# Patient Record
Sex: MALE | Race: WHITE | NOT HISPANIC OR LATINO | Employment: FULL TIME | RURAL
[De-identification: names, ages, dates, MRNs, and addresses within clinical notes are randomized per-mention and may not be internally consistent; named-entity substitution may affect disease eponyms.]

---

## 2020-08-19 ENCOUNTER — HISTORICAL (OUTPATIENT)
Dept: ADMINISTRATIVE | Facility: HOSPITAL | Age: 35
End: 2020-08-19

## 2020-09-15 ENCOUNTER — HISTORICAL (OUTPATIENT)
Dept: ADMINISTRATIVE | Facility: HOSPITAL | Age: 35
End: 2020-09-15

## 2020-09-15 LAB
ALBUMIN SERPL BCP-MCNC: 4.5 G/DL (ref 3.5–5)
ALBUMIN/GLOB SERPL: 1.2 {RATIO}
ALP SERPL-CCNC: 74 U/L (ref 45–115)
ALT SERPL W P-5'-P-CCNC: 29 U/L (ref 16–61)
ANION GAP SERPL CALCULATED.3IONS-SCNC: 13 MMOL/L
AST SERPL W P-5'-P-CCNC: 14 U/L (ref 15–37)
BASOPHILS # BLD AUTO: 0.03 X10E3/UL (ref 0–0.2)
BASOPHILS NFR BLD AUTO: 0.3 % (ref 0–1)
BILIRUB SERPL-MCNC: 0.3 MG/DL (ref 0–1.2)
BUN SERPL-MCNC: 28 MG/DL (ref 7–18)
BUN/CREAT SERPL: 18.7
CALCIUM SERPL-MCNC: 9.4 MG/DL (ref 8.5–10.1)
CHLORIDE SERPL-SCNC: 104 MMOL/L (ref 98–107)
CO2 SERPL-SCNC: 26 MMOL/L (ref 21–32)
CREAT SERPL-MCNC: 1.5 MG/DL (ref 0.7–1.3)
D DIMER PPP FEU-MCNC: 0.27 UG/ML (ref 0–0.47)
EOSINOPHIL # BLD AUTO: 0.15 X10E3/UL (ref 0–0.5)
EOSINOPHIL NFR BLD AUTO: 1.7 % (ref 1–4)
ERYTHROCYTE [DISTWIDTH] IN BLOOD BY AUTOMATED COUNT: 12.5 % (ref 11.5–14.5)
GLOBULIN SER-MCNC: 3.8 G/DL (ref 2–4)
GLUCOSE SERPL-MCNC: 116 MG/DL (ref 74–106)
HCT VFR BLD AUTO: 46.2 % (ref 40–54)
HGB BLD-MCNC: 15.8 G/DL (ref 13.5–18)
IMM GRANULOCYTES # BLD AUTO: 0.01 X10E3/UL (ref 0–0.04)
IMM GRANULOCYTES NFR BLD: 0.1 % (ref 0–0.4)
LYMPHOCYTES # BLD AUTO: 2.87 X10E3/UL (ref 1–4.8)
LYMPHOCYTES NFR BLD AUTO: 32.7 % (ref 27–41)
MCH RBC QN AUTO: 29.5 PG (ref 27–31)
MCHC RBC AUTO-ENTMCNC: 34.2 G/DL (ref 32–36)
MCV RBC AUTO: 86.4 FL (ref 80–96)
MONOCYTES # BLD AUTO: 0.67 X10E3/UL (ref 0–0.8)
MONOCYTES NFR BLD AUTO: 7.6 % (ref 2–6)
MPC BLD CALC-MCNC: 10 FL (ref 9.4–12.4)
NEUTROPHILS # BLD AUTO: 5.05 X10E3/UL (ref 1.8–7.7)
NEUTROPHILS NFR BLD AUTO: 57.6 % (ref 53–65)
PLATELET # BLD AUTO: 314 X10E3/UL (ref 150–400)
POTASSIUM SERPL-SCNC: 3.8 MMOL/L (ref 3.5–5.1)
PROT SERPL-MCNC: 8.3 G/DL (ref 6.4–8.2)
RBC # BLD AUTO: 5.35 X10E6/UL (ref 4.6–6.2)
SODIUM SERPL-SCNC: 139 MMOL/L (ref 136–145)
TROPONIN I SERPL-MCNC: <0.017 NG/ML (ref 0–0.06)
WBC # BLD AUTO: 8.78 X10E3/UL (ref 4.5–11)

## 2020-09-16 ENCOUNTER — HISTORICAL (OUTPATIENT)
Dept: ADMINISTRATIVE | Facility: HOSPITAL | Age: 35
End: 2020-09-16

## 2021-08-17 ENCOUNTER — OFFICE VISIT (OUTPATIENT)
Dept: PRIMARY CARE CLINIC | Facility: CLINIC | Age: 36
End: 2021-08-17
Payer: COMMERCIAL

## 2021-08-17 VITALS
DIASTOLIC BLOOD PRESSURE: 88 MMHG | BODY MASS INDEX: 31.39 KG/M2 | TEMPERATURE: 98 F | SYSTOLIC BLOOD PRESSURE: 140 MMHG | WEIGHT: 200 LBS | HEART RATE: 79 BPM | RESPIRATION RATE: 18 BRPM | HEIGHT: 67 IN | OXYGEN SATURATION: 97 %

## 2021-08-17 DIAGNOSIS — R05.9 COUGH: Primary | ICD-10-CM

## 2021-08-17 DIAGNOSIS — J32.9 SINUSITIS, UNSPECIFIED CHRONICITY, UNSPECIFIED LOCATION: ICD-10-CM

## 2021-08-17 DIAGNOSIS — J40 BRONCHITIS: ICD-10-CM

## 2021-08-17 LAB
CTP QC/QA: YES
FLUAV AG NPH QL: NEGATIVE
FLUBV AG NPH QL: NEGATIVE
SARS-COV-2 AG RESP QL IA.RAPID: NEGATIVE

## 2021-08-17 PROCEDURE — 87428 POCT SARS-COV2 (COVID) WITH FLU ANTIGEN: ICD-10-PCS | Mod: QW,,, | Performed by: FAMILY MEDICINE

## 2021-08-17 PROCEDURE — 87428 SARSCOV & INF VIR A&B AG IA: CPT | Mod: QW,,, | Performed by: FAMILY MEDICINE

## 2021-08-17 PROCEDURE — 96372 PR INJECTION,THERAP/PROPH/DIAG2ST, IM OR SUBCUT: ICD-10-PCS | Mod: ,,, | Performed by: FAMILY MEDICINE

## 2021-08-17 PROCEDURE — 99213 PR OFFICE/OUTPT VISIT, EST, LEVL III, 20-29 MIN: ICD-10-PCS | Mod: 25,,, | Performed by: FAMILY MEDICINE

## 2021-08-17 PROCEDURE — 99213 OFFICE O/P EST LOW 20 MIN: CPT | Mod: 25,,, | Performed by: FAMILY MEDICINE

## 2021-08-17 PROCEDURE — 96372 THER/PROPH/DIAG INJ SC/IM: CPT | Mod: ,,, | Performed by: FAMILY MEDICINE

## 2021-08-17 RX ORDER — CEFDINIR 300 MG/1
300 CAPSULE ORAL 2 TIMES DAILY
Qty: 20 CAPSULE | Refills: 0 | Status: SHIPPED | OUTPATIENT
Start: 2021-08-17 | End: 2021-08-27

## 2021-08-17 RX ORDER — CEFTRIAXONE 1 G/1
1 INJECTION, POWDER, FOR SOLUTION INTRAMUSCULAR; INTRAVENOUS
Status: COMPLETED | OUTPATIENT
Start: 2021-08-17 | End: 2021-08-17

## 2021-08-17 RX ORDER — BETAMETHASONE SODIUM PHOSPHATE AND BETAMETHASONE ACETATE 3; 3 MG/ML; MG/ML
6 INJECTION, SUSPENSION INTRA-ARTICULAR; INTRALESIONAL; INTRAMUSCULAR; SOFT TISSUE
Status: COMPLETED | OUTPATIENT
Start: 2021-08-17 | End: 2021-08-17

## 2021-08-17 RX ORDER — DEXCHLORPHENIRAMINE MALEATE, DEXTROMETHORPHAN HBR, PHENYLEPHRINE HCL 1; 10; 5 MG/5ML; MG/5ML; MG/5ML
10 SYRUP ORAL
Qty: 240 ML | Refills: 1 | Status: SHIPPED | OUTPATIENT
Start: 2021-08-17

## 2021-08-17 RX ADMIN — BETAMETHASONE SODIUM PHOSPHATE AND BETAMETHASONE ACETATE 6 MG: 3; 3 INJECTION, SUSPENSION INTRA-ARTICULAR; INTRALESIONAL; INTRAMUSCULAR; SOFT TISSUE at 10:08

## 2021-08-17 RX ADMIN — CEFTRIAXONE 1 G: 1 INJECTION, POWDER, FOR SOLUTION INTRAMUSCULAR; INTRAVENOUS at 11:08

## 2021-10-25 ENCOUNTER — OFFICE VISIT (OUTPATIENT)
Dept: PRIMARY CARE CLINIC | Facility: CLINIC | Age: 36
End: 2021-10-25
Payer: COMMERCIAL

## 2021-10-25 VITALS
OXYGEN SATURATION: 98 % | DIASTOLIC BLOOD PRESSURE: 82 MMHG | TEMPERATURE: 98 F | RESPIRATION RATE: 20 BRPM | HEIGHT: 67 IN | SYSTOLIC BLOOD PRESSURE: 120 MMHG | WEIGHT: 216 LBS | HEART RATE: 88 BPM | BODY MASS INDEX: 33.9 KG/M2

## 2021-10-25 DIAGNOSIS — U07.1 COVID-19: Primary | ICD-10-CM

## 2021-10-25 DIAGNOSIS — R52 GENERALIZED BODY ACHES: ICD-10-CM

## 2021-10-25 LAB
CTP QC/QA: YES
FLUAV AG NPH QL: NEGATIVE
FLUBV AG NPH QL: NEGATIVE
SARS-COV-2 AG RESP QL IA.RAPID: POSITIVE

## 2021-10-25 PROCEDURE — 87428 POCT SARS-COV2 (COVID) WITH FLU ANTIGEN: ICD-10-PCS | Mod: QW,,, | Performed by: FAMILY MEDICINE

## 2021-10-25 PROCEDURE — 99213 PR OFFICE/OUTPT VISIT, EST, LEVL III, 20-29 MIN: ICD-10-PCS | Mod: ,,, | Performed by: FAMILY MEDICINE

## 2021-10-25 PROCEDURE — 87428 SARSCOV & INF VIR A&B AG IA: CPT | Mod: QW,,, | Performed by: FAMILY MEDICINE

## 2021-10-25 PROCEDURE — 99213 OFFICE O/P EST LOW 20 MIN: CPT | Mod: ,,, | Performed by: FAMILY MEDICINE

## 2021-10-25 RX ORDER — ACETAMINOPHEN, DEXTROMETHORPHAN HBR, GUAIFENESIN, PSEUDOEPHEDRINE HCL 325; 20; 200; 60 MG/1; MG/1; MG/1; MG/1
1 TABLET ORAL
Qty: 30 TABLET | Refills: 2 | Status: SHIPPED | OUTPATIENT
Start: 2021-10-25

## 2021-10-25 RX ORDER — METHYLPREDNISOLONE 4 MG/1
TABLET ORAL
Qty: 21 EACH | Refills: 0 | Status: SHIPPED | OUTPATIENT
Start: 2021-10-25 | End: 2021-11-15

## 2021-10-25 RX ORDER — AZITHROMYCIN 250 MG/1
250 TABLET, FILM COATED ORAL DAILY
Qty: 10 TABLET | Refills: 0 | Status: SHIPPED | OUTPATIENT
Start: 2021-10-25

## 2023-01-24 ENCOUNTER — OFFICE VISIT (OUTPATIENT)
Dept: PRIMARY CARE CLINIC | Facility: CLINIC | Age: 38
End: 2023-01-24
Payer: COMMERCIAL

## 2023-01-24 VITALS
TEMPERATURE: 98 F | HEART RATE: 82 BPM | HEIGHT: 67 IN | SYSTOLIC BLOOD PRESSURE: 120 MMHG | OXYGEN SATURATION: 97 % | WEIGHT: 225 LBS | BODY MASS INDEX: 35.31 KG/M2 | RESPIRATION RATE: 20 BRPM | DIASTOLIC BLOOD PRESSURE: 80 MMHG

## 2023-01-24 DIAGNOSIS — J40 BRONCHITIS: ICD-10-CM

## 2023-01-24 DIAGNOSIS — H61.23 BILATERAL IMPACTED CERUMEN: ICD-10-CM

## 2023-01-24 DIAGNOSIS — R05.1 ACUTE COUGH: Primary | ICD-10-CM

## 2023-01-24 PROBLEM — M25.519 ARTHRALGIA OF SHOULDER: Status: ACTIVE | Noted: 2023-01-24

## 2023-01-24 PROBLEM — F32.A DEPRESSION: Status: ACTIVE | Noted: 2023-01-24

## 2023-01-24 PROBLEM — F41.9 ANXIETY: Status: ACTIVE | Noted: 2023-01-24

## 2023-01-24 PROBLEM — M54.50 LOW BACK PAIN: Status: ACTIVE | Noted: 2023-01-24

## 2023-01-24 PROBLEM — M12.50 TRAUMATIC ARTHROPATHY: Status: ACTIVE | Noted: 2023-01-24

## 2023-01-24 PROBLEM — F43.29 ADJUSTMENT DISORDER WITH MIXED EMOTIONAL FEATURES: Status: ACTIVE | Noted: 2023-01-24

## 2023-01-24 PROBLEM — J30.9 ALLERGIC RHINITIS: Status: ACTIVE | Noted: 2023-01-24

## 2023-01-24 PROBLEM — M54.2 NECK PAIN: Status: ACTIVE | Noted: 2023-01-24

## 2023-01-24 PROBLEM — F48.9 DEFERRED DIAGNOSIS ON AXIS II: Status: ACTIVE | Noted: 2023-01-24

## 2023-01-24 PROBLEM — F48.9 DEFERRED DIAGNOSIS ON AXIS I: Status: ACTIVE | Noted: 2023-01-24

## 2023-01-24 PROBLEM — F48.9 AXIS IV DIAGNOSIS: Status: ACTIVE | Noted: 2023-01-24

## 2023-01-24 PROBLEM — Z02.89 HEALTH EXAMINATION OF DEFINED SUBPOPULATION: Status: ACTIVE | Noted: 2023-01-24

## 2023-01-24 PROBLEM — F17.200 NICOTINE DEPENDENCE: Status: ACTIVE | Noted: 2023-01-24

## 2023-01-24 PROBLEM — F48.9 DEFERRED DIAGNOSIS ON AXIS III: Status: ACTIVE | Noted: 2023-01-24

## 2023-01-24 PROCEDURE — 1159F MED LIST DOCD IN RCRD: CPT | Mod: CPTII,,, | Performed by: FAMILY MEDICINE

## 2023-01-24 PROCEDURE — 1160F PR REVIEW ALL MEDS BY PRESCRIBER/CLIN PHARMACIST DOCUMENTED: ICD-10-PCS | Mod: CPTII,,, | Performed by: FAMILY MEDICINE

## 2023-01-24 PROCEDURE — 3074F SYST BP LT 130 MM HG: CPT | Mod: CPTII,,, | Performed by: FAMILY MEDICINE

## 2023-01-24 PROCEDURE — 3079F PR MOST RECENT DIASTOLIC BLOOD PRESSURE 80-89 MM HG: ICD-10-PCS | Mod: CPTII,,, | Performed by: FAMILY MEDICINE

## 2023-01-24 PROCEDURE — 96372 PR INJECTION,THERAP/PROPH/DIAG2ST, IM OR SUBCUT: ICD-10-PCS | Mod: ,,, | Performed by: FAMILY MEDICINE

## 2023-01-24 PROCEDURE — 99213 OFFICE O/P EST LOW 20 MIN: CPT | Mod: 25,,, | Performed by: FAMILY MEDICINE

## 2023-01-24 PROCEDURE — 3079F DIAST BP 80-89 MM HG: CPT | Mod: CPTII,,, | Performed by: FAMILY MEDICINE

## 2023-01-24 PROCEDURE — 1160F RVW MEDS BY RX/DR IN RCRD: CPT | Mod: CPTII,,, | Performed by: FAMILY MEDICINE

## 2023-01-24 PROCEDURE — 3008F PR BODY MASS INDEX (BMI) DOCUMENTED: ICD-10-PCS | Mod: CPTII,,, | Performed by: FAMILY MEDICINE

## 2023-01-24 PROCEDURE — 69209 REMOVE IMPACTED EAR WAX UNI: CPT | Mod: 50,,, | Performed by: FAMILY MEDICINE

## 2023-01-24 PROCEDURE — 1159F PR MEDICATION LIST DOCUMENTED IN MEDICAL RECORD: ICD-10-PCS | Mod: CPTII,,, | Performed by: FAMILY MEDICINE

## 2023-01-24 PROCEDURE — 69209 PR REMOVAL IMPACTED CERUMEN USING IRRIGATION/LAVAGE, UNILATERAL: ICD-10-PCS | Mod: 50,,, | Performed by: FAMILY MEDICINE

## 2023-01-24 PROCEDURE — 3008F BODY MASS INDEX DOCD: CPT | Mod: CPTII,,, | Performed by: FAMILY MEDICINE

## 2023-01-24 PROCEDURE — 3074F PR MOST RECENT SYSTOLIC BLOOD PRESSURE < 130 MM HG: ICD-10-PCS | Mod: CPTII,,, | Performed by: FAMILY MEDICINE

## 2023-01-24 PROCEDURE — 96372 THER/PROPH/DIAG INJ SC/IM: CPT | Mod: ,,, | Performed by: FAMILY MEDICINE

## 2023-01-24 PROCEDURE — 99213 PR OFFICE/OUTPT VISIT, EST, LEVL III, 20-29 MIN: ICD-10-PCS | Mod: 25,,, | Performed by: FAMILY MEDICINE

## 2023-01-24 RX ORDER — AMOXICILLIN AND CLAVULANATE POTASSIUM 500; 125 MG/1; MG/1
1 TABLET, FILM COATED ORAL 2 TIMES DAILY
Qty: 20 TABLET | Refills: 0 | Status: SHIPPED | OUTPATIENT
Start: 2023-01-24

## 2023-01-24 RX ORDER — CEFTRIAXONE 1 G/1
1 INJECTION, POWDER, FOR SOLUTION INTRAMUSCULAR; INTRAVENOUS
Status: COMPLETED | OUTPATIENT
Start: 2023-01-24 | End: 2023-01-24

## 2023-01-24 RX ORDER — TADALAFIL 2.5 MG/1
1 TABLET ORAL NIGHTLY
COMMUNITY
Start: 2022-11-10

## 2023-01-24 RX ORDER — DEXCHLORPHENIRAMINE MALEATE, DEXTROMETHORPHAN HBR, PHENYLEPHRINE HCL 1; 10; 5 MG/5ML; MG/5ML; MG/5ML
10 SYRUP ORAL
Qty: 240 ML | Refills: 1 | Status: SHIPPED | OUTPATIENT
Start: 2023-01-24

## 2023-01-24 RX ORDER — METHYLPREDNISOLONE 4 MG/1
TABLET ORAL
Qty: 21 EACH | Refills: 0 | Status: SHIPPED | OUTPATIENT
Start: 2023-01-24 | End: 2023-02-14

## 2023-01-24 RX ORDER — BETAMETHASONE SODIUM PHOSPHATE AND BETAMETHASONE ACETATE 3; 3 MG/ML; MG/ML
6 INJECTION, SUSPENSION INTRA-ARTICULAR; INTRALESIONAL; INTRAMUSCULAR; SOFT TISSUE
Status: COMPLETED | OUTPATIENT
Start: 2023-01-24 | End: 2023-01-24

## 2023-01-24 RX ORDER — TADALAFIL 5 MG/1
5 TABLET ORAL NIGHTLY
COMMUNITY
Start: 2022-12-22

## 2023-01-24 RX ADMIN — CEFTRIAXONE 1 G: 1 INJECTION, POWDER, FOR SOLUTION INTRAMUSCULAR; INTRAVENOUS at 09:01

## 2023-01-24 RX ADMIN — BETAMETHASONE SODIUM PHOSPHATE AND BETAMETHASONE ACETATE 6 MG: 3; 3 INJECTION, SUSPENSION INTRA-ARTICULAR; INTRALESIONAL; INTRAMUSCULAR; SOFT TISSUE at 09:01

## 2023-01-24 NOTE — PROGRESS NOTES
Bilateral ears irrigated with elephant ear washer until tm visible. Pt tolerated well. Performed by Xi Simon LPN.

## 2023-01-24 NOTE — PROGRESS NOTES
Subjective:       Patient ID: Syed Casanova is a 37 y.o. male.    Chief Complaint: Cough and Sinus Problem    Pt. With chest congestion. Still smoking but cutting back. No fever.    Cough  Pertinent negatives include no chest pain, fever, headaches, myalgias or shortness of breath. There is no history of environmental allergies.   Sinus Problem  Associated symptoms include congestion and coughing. Pertinent negatives include no headaches or shortness of breath.   Review of Systems   Constitutional:  Negative for fatigue and fever.   HENT:  Positive for nasal congestion. Negative for dental problem.    Eyes:  Negative for discharge.   Respiratory:  Positive for cough. Negative for choking, chest tightness and shortness of breath.    Cardiovascular:  Negative for chest pain and leg swelling.   Gastrointestinal:  Negative for constipation, diarrhea, nausea and vomiting.   Genitourinary:  Negative for discharge and flank pain.   Musculoskeletal:  Negative for arthralgias and myalgias.   Allergic/Immunologic: Negative for environmental allergies.   Neurological:  Negative for headaches and memory loss.   Psychiatric/Behavioral:  Negative for behavioral problems and hallucinations.        Objective:      Physical Exam  Vitals and nursing note reviewed.   Constitutional:       Appearance: Normal appearance. He is normal weight.   HENT:      Head: Normocephalic and atraumatic.      Right Ear: Tympanic membrane normal. There is impacted cerumen.      Left Ear: Tympanic membrane normal. There is impacted cerumen.      Nose: Congestion present.      Mouth/Throat:      Mouth: Mucous membranes are moist.   Eyes:      Extraocular Movements: Extraocular movements intact.      Conjunctiva/sclera: Conjunctivae normal.      Pupils: Pupils are equal, round, and reactive to light.   Cardiovascular:      Rate and Rhythm: Normal rate and regular rhythm.      Pulses: Normal pulses.   Pulmonary:      Effort: Pulmonary effort is normal.       Breath sounds: Normal breath sounds.   Abdominal:      General: Abdomen is flat. Bowel sounds are normal.      Palpations: Abdomen is soft.   Musculoskeletal:         General: Normal range of motion.      Cervical back: Normal range of motion and neck supple.   Skin:     General: Skin is warm and dry.   Neurological:      General: No focal deficit present.      Mental Status: He is alert and oriented to person, place, and time.   Psychiatric:         Mood and Affect: Mood normal.       Assessment:       Problem List Items Addressed This Visit          Pulmonary    Cough - Primary    Relevant Orders    X-Ray Chest PA And Lateral    Bronchitis    Relevant Medications    cefTRIAXone injection 1 g (Start on 1/24/2023  9:00 AM)    betamethasone acetate-betamethasone sodium phosphate injection 6 mg (Start on 1/24/2023  9:00 AM)    amoxicillin-clavulanate 500-125mg (AUGMENTIN) 500-125 mg Tab    dexchlorphen-phenylephrine-DM (POLYTUSSIN DM) 1-5-10 mg/5 mL Syrp    methylPREDNISolone (MEDROL DOSEPACK) 4 mg tablet     Other Visit Diagnoses       Bilateral impacted cerumen        Relevant Orders    Ear wax removal (Completed)              Plan:       RTC 1 week if s/sx continue

## 2024-07-22 ENCOUNTER — OFFICE VISIT (OUTPATIENT)
Dept: PRIMARY CARE CLINIC | Facility: CLINIC | Age: 39
End: 2024-07-22
Payer: COMMERCIAL

## 2024-07-22 VITALS
BODY MASS INDEX: 32.33 KG/M2 | HEIGHT: 67 IN | OXYGEN SATURATION: 97 % | RESPIRATION RATE: 20 BRPM | SYSTOLIC BLOOD PRESSURE: 132 MMHG | WEIGHT: 206 LBS | TEMPERATURE: 99 F | HEART RATE: 93 BPM | DIASTOLIC BLOOD PRESSURE: 76 MMHG

## 2024-07-22 DIAGNOSIS — M19.90 ARTHRITIS: ICD-10-CM

## 2024-07-22 DIAGNOSIS — K21.9 GASTROESOPHAGEAL REFLUX DISEASE, UNSPECIFIED WHETHER ESOPHAGITIS PRESENT: ICD-10-CM

## 2024-07-22 DIAGNOSIS — F43.23 ADJUSTMENT DISORDER WITH MIXED ANXIETY AND DEPRESSED MOOD: ICD-10-CM

## 2024-07-22 DIAGNOSIS — E34.9 TESTOSTERONE DEFICIENCY: ICD-10-CM

## 2024-07-22 DIAGNOSIS — Z79.899 LONG TERM USE OF DRUG: Primary | ICD-10-CM

## 2024-07-22 PROBLEM — Z65.9 PROBLEM RELATED TO UNSPECIFIED PSYCHOSOCIAL CIRCUMSTANCES: Status: ACTIVE | Noted: 2024-07-22

## 2024-07-22 PROBLEM — Z23 NEED FOR VACCINATION: Status: ACTIVE | Noted: 2024-07-22

## 2024-07-22 LAB
ALBUMIN SERPL BCP-MCNC: 4 G/DL (ref 3.5–5)
ALBUMIN/GLOB SERPL: 1.1 {RATIO}
ALP SERPL-CCNC: 60 U/L (ref 45–115)
ALT SERPL W P-5'-P-CCNC: 34 U/L (ref 16–61)
ANION GAP SERPL CALCULATED.3IONS-SCNC: 11 MMOL/L (ref 7–16)
AST SERPL W P-5'-P-CCNC: 13 U/L (ref 15–37)
BASOPHILS # BLD AUTO: 0.07 K/UL (ref 0–0.2)
BASOPHILS NFR BLD AUTO: 0.7 % (ref 0–1)
BILIRUB SERPL-MCNC: 0.5 MG/DL (ref ?–1.2)
BUN SERPL-MCNC: 14 MG/DL (ref 7–18)
BUN/CREAT SERPL: 11 (ref 6–20)
CALCIUM SERPL-MCNC: 9.6 MG/DL (ref 8.5–10.1)
CHLORIDE SERPL-SCNC: 105 MMOL/L (ref 98–107)
CHOLEST SERPL-MCNC: 140 MG/DL (ref 0–200)
CHOLEST/HDLC SERPL: 3.7 {RATIO}
CO2 SERPL-SCNC: 27 MMOL/L (ref 21–32)
CREAT SERPL-MCNC: 1.25 MG/DL (ref 0.7–1.3)
DIFFERENTIAL METHOD BLD: ABNORMAL
EGFR (NO RACE VARIABLE) (RUSH/TITUS): 75 ML/MIN/1.73M2
EOSINOPHIL # BLD AUTO: 0.06 K/UL (ref 0–0.5)
EOSINOPHIL NFR BLD AUTO: 0.6 % (ref 1–4)
ERYTHROCYTE [DISTWIDTH] IN BLOOD BY AUTOMATED COUNT: 12.1 % (ref 11.5–14.5)
GLOBULIN SER-MCNC: 3.5 G/DL (ref 2–4)
GLUCOSE SERPL-MCNC: 72 MG/DL (ref 74–106)
HCT VFR BLD AUTO: 52.9 % (ref 40–54)
HDLC SERPL-MCNC: 38 MG/DL (ref 40–60)
HGB BLD-MCNC: 17.9 G/DL (ref 13.5–18)
IMM GRANULOCYTES # BLD AUTO: 0.04 K/UL (ref 0–0.04)
IMM GRANULOCYTES NFR BLD: 0.4 % (ref 0–0.4)
LDLC SERPL CALC-MCNC: 71 MG/DL
LDLC/HDLC SERPL: 1.9 {RATIO}
LYMPHOCYTES # BLD AUTO: 2.01 K/UL (ref 1–4.8)
LYMPHOCYTES NFR BLD AUTO: 18.7 % (ref 27–41)
MCH RBC QN AUTO: 30.3 PG (ref 27–31)
MCHC RBC AUTO-ENTMCNC: 33.8 G/DL (ref 32–36)
MCV RBC AUTO: 89.7 FL (ref 80–96)
MONOCYTES # BLD AUTO: 0.83 K/UL (ref 0–0.8)
MONOCYTES NFR BLD AUTO: 7.7 % (ref 2–6)
MPC BLD CALC-MCNC: 10.9 FL (ref 9.4–12.4)
NEUTROPHILS # BLD AUTO: 7.73 K/UL (ref 1.8–7.7)
NEUTROPHILS NFR BLD AUTO: 71.9 % (ref 53–65)
NONHDLC SERPL-MCNC: 102 MG/DL
NRBC # BLD AUTO: 0 X10E3/UL
NRBC, AUTO (.00): 0 %
PLATELET # BLD AUTO: 293 K/UL (ref 150–400)
POTASSIUM SERPL-SCNC: 5.2 MMOL/L (ref 3.5–5.1)
PROT SERPL-MCNC: 7.5 G/DL (ref 6.4–8.2)
RBC # BLD AUTO: 5.9 M/UL (ref 4.6–6.2)
SODIUM SERPL-SCNC: 138 MMOL/L (ref 136–145)
TESTOST SERPL-MCNC: 517 NG/DL (ref 240–950)
TRIGL SERPL-MCNC: 154 MG/DL (ref 35–150)
VLDLC SERPL-MCNC: 31 MG/DL
WBC # BLD AUTO: 10.74 K/UL (ref 4.5–11)

## 2024-07-22 PROCEDURE — 3008F BODY MASS INDEX DOCD: CPT | Mod: CPTII,,, | Performed by: FAMILY MEDICINE

## 2024-07-22 PROCEDURE — 80053 COMPREHEN METABOLIC PANEL: CPT | Mod: ,,, | Performed by: CLINICAL MEDICAL LABORATORY

## 2024-07-22 PROCEDURE — 84403 ASSAY OF TOTAL TESTOSTERONE: CPT | Mod: ,,, | Performed by: CLINICAL MEDICAL LABORATORY

## 2024-07-22 PROCEDURE — 3075F SYST BP GE 130 - 139MM HG: CPT | Mod: CPTII,,, | Performed by: FAMILY MEDICINE

## 2024-07-22 PROCEDURE — 96372 THER/PROPH/DIAG INJ SC/IM: CPT | Mod: ,,, | Performed by: FAMILY MEDICINE

## 2024-07-22 PROCEDURE — 99214 OFFICE O/P EST MOD 30 MIN: CPT | Mod: 25,,, | Performed by: FAMILY MEDICINE

## 2024-07-22 PROCEDURE — 1159F MED LIST DOCD IN RCRD: CPT | Mod: CPTII,,, | Performed by: FAMILY MEDICINE

## 2024-07-22 PROCEDURE — 80061 LIPID PANEL: CPT | Mod: ,,, | Performed by: CLINICAL MEDICAL LABORATORY

## 2024-07-22 PROCEDURE — 3078F DIAST BP <80 MM HG: CPT | Mod: CPTII,,, | Performed by: FAMILY MEDICINE

## 2024-07-22 PROCEDURE — 1160F RVW MEDS BY RX/DR IN RCRD: CPT | Mod: CPTII,,, | Performed by: FAMILY MEDICINE

## 2024-07-22 PROCEDURE — 85025 COMPLETE CBC W/AUTO DIFF WBC: CPT | Mod: ,,, | Performed by: CLINICAL MEDICAL LABORATORY

## 2024-07-22 RX ORDER — KETOROLAC TROMETHAMINE 30 MG/ML
60 INJECTION, SOLUTION INTRAMUSCULAR; INTRAVENOUS
Status: COMPLETED | OUTPATIENT
Start: 2024-07-22 | End: 2024-07-22

## 2024-07-22 RX ORDER — METHYLPREDNISOLONE ACETATE 80 MG/ML
80 INJECTION, SUSPENSION INTRA-ARTICULAR; INTRALESIONAL; INTRAMUSCULAR; SOFT TISSUE
Status: COMPLETED | OUTPATIENT
Start: 2024-07-22 | End: 2024-07-22

## 2024-07-22 RX ORDER — PANTOPRAZOLE SODIUM 40 MG/1
40 TABLET, DELAYED RELEASE ORAL DAILY
Qty: 90 TABLET | Refills: 3 | Status: SHIPPED | OUTPATIENT
Start: 2024-07-22 | End: 2025-07-22

## 2024-07-22 RX ORDER — TESTOSTERONE CYPIONATE 200 MG/ML
200 INJECTION, SOLUTION INTRAMUSCULAR
COMMUNITY

## 2024-07-22 RX ORDER — DEXAMETHASONE SODIUM PHOSPHATE 4 MG/ML
4 INJECTION, SOLUTION INTRA-ARTICULAR; INTRALESIONAL; INTRAMUSCULAR; INTRAVENOUS; SOFT TISSUE
Status: COMPLETED | OUTPATIENT
Start: 2024-07-22 | End: 2024-07-22

## 2024-07-22 RX ORDER — BUPROPION HYDROCHLORIDE 150 MG/1
150 TABLET ORAL DAILY
Qty: 30 TABLET | Refills: 11 | Status: SHIPPED | OUTPATIENT
Start: 2024-07-22 | End: 2025-07-22

## 2024-07-22 RX ADMIN — DEXAMETHASONE SODIUM PHOSPHATE 4 MG: 4 INJECTION, SOLUTION INTRA-ARTICULAR; INTRALESIONAL; INTRAMUSCULAR; INTRAVENOUS; SOFT TISSUE at 10:07

## 2024-07-22 RX ADMIN — KETOROLAC TROMETHAMINE 60 MG: 30 INJECTION, SOLUTION INTRAMUSCULAR; INTRAVENOUS at 10:07

## 2024-07-22 RX ADMIN — METHYLPREDNISOLONE ACETATE 80 MG: 80 INJECTION, SUSPENSION INTRA-ARTICULAR; INTRALESIONAL; INTRAMUSCULAR; SOFT TISSUE at 10:07

## 2024-07-22 NOTE — PROGRESS NOTES
Subjective     Patient ID: Syed Casanova is a 38 y.o. male.    Chief Complaint: Gastroesophageal Reflux, Stool Color Change (Dark in color), and GI Problem (Cramping and pain/woke him up Friday/aftraid might have a stomach ulcer)    Having epigastric pain with dark stools starting this weekend. Has been taking OTC meds for his stomach. Wants to see a GI in [place]    Gastroesophageal Reflux  He complains of abdominal pain. He reports no chest pain, no choking, no coughing or no nausea. Pertinent negatives include no fatigue.   GI Problem  The primary symptoms include abdominal pain and arthralgias. Primary symptoms do not include fever, fatigue, nausea, vomiting, diarrhea or myalgias.   The illness does not include constipation. Significant associated medical issues include GERD.     Review of Systems   Constitutional:  Negative for fatigue and fever.   HENT:  Negative for dental problem.    Eyes:  Negative for discharge.   Respiratory:  Negative for cough, choking, chest tightness and shortness of breath.    Cardiovascular:  Negative for chest pain and leg swelling.   Gastrointestinal:  Positive for abdominal pain and reflux. Negative for constipation, diarrhea, nausea and vomiting.   Genitourinary:  Negative for discharge and flank pain.   Musculoskeletal:  Positive for arthralgias. Negative for myalgias.   Allergic/Immunologic: Negative for environmental allergies.   Neurological:  Negative for headaches and memory loss.   Psychiatric/Behavioral:  Negative for behavioral problems and hallucinations.           Objective     Physical Exam  Vitals and nursing note reviewed.   Constitutional:       Appearance: Normal appearance. He is normal weight.   HENT:      Head: Normocephalic and atraumatic.      Right Ear: Tympanic membrane normal.      Left Ear: Tympanic membrane normal.      Nose: Nose normal.      Mouth/Throat:      Mouth: Mucous membranes are moist.   Eyes:      Extraocular Movements: Extraocular  movements intact.      Conjunctiva/sclera: Conjunctivae normal.      Pupils: Pupils are equal, round, and reactive to light.   Cardiovascular:      Rate and Rhythm: Normal rate and regular rhythm.      Pulses: Normal pulses.   Pulmonary:      Effort: Pulmonary effort is normal.      Breath sounds: Normal breath sounds.   Abdominal:      General: Abdomen is flat. Bowel sounds are normal.      Palpations: Abdomen is soft.   Musculoskeletal:         General: Normal range of motion.      Cervical back: Normal range of motion and neck supple.      Comments: Multiple site arthritis   Skin:     General: Skin is warm and dry.   Neurological:      General: No focal deficit present.      Mental Status: He is alert and oriented to person, place, and time.   Psychiatric:         Mood and Affect: Mood normal.            Assessment and Plan     1. Long term use of drug  -     CBC Auto Differential; Future; Expected date: 07/22/2024  -     Comprehensive Metabolic Panel; Future; Expected date: 07/22/2024  -     Lipid Panel; Future; Expected date: 07/22/2024    2. Testosterone deficiency  -     Testosterone; Future; Expected date: 07/22/2024    3. Arthritis  -     dexAMETHasone injection 4 mg  -     methylPREDNISolone acetate injection 80 mg  -     ketorolac injection 60 mg    4. Adjustment disorder with mixed anxiety and depressed mood  -     buPROPion (WELLBUTRIN XL) 150 MG TB24 tablet; Take 1 tablet (150 mg total) by mouth once daily.  Dispense: 30 tablet; Refill: 11    5. Gastroesophageal reflux disease, unspecified whether esophagitis present  -     pantoprazole (PROTONIX) 40 MG tablet; Take 1 tablet (40 mg total) by mouth once daily.  Dispense: 90 tablet; Refill: 3  -     Ambulatory referral/consult to Gastroenterology; Future; Expected date: 07/29/2024        RTC as needed  Will call lab results         No follow-ups on file.

## 2024-07-26 ENCOUNTER — TELEPHONE (OUTPATIENT)
Dept: PRIMARY CARE CLINIC | Facility: CLINIC | Age: 39
End: 2024-07-26
Payer: COMMERCIAL

## 2024-10-25 ENCOUNTER — OFFICE VISIT (OUTPATIENT)
Dept: GASTROENTEROLOGY | Facility: CLINIC | Age: 39
End: 2024-10-25
Payer: COMMERCIAL

## 2024-10-25 VITALS
HEART RATE: 85 BPM | WEIGHT: 208.63 LBS | HEIGHT: 67 IN | SYSTOLIC BLOOD PRESSURE: 133 MMHG | BODY MASS INDEX: 32.74 KG/M2 | OXYGEN SATURATION: 98 % | DIASTOLIC BLOOD PRESSURE: 92 MMHG

## 2024-10-25 DIAGNOSIS — K21.9 GASTROESOPHAGEAL REFLUX DISEASE, UNSPECIFIED WHETHER ESOPHAGITIS PRESENT: Primary | ICD-10-CM

## 2024-10-25 DIAGNOSIS — R11.0 NAUSEA: ICD-10-CM

## 2024-10-25 DIAGNOSIS — K92.1 MELENA: ICD-10-CM

## 2024-10-25 DIAGNOSIS — R07.9 CHEST PAIN, UNSPECIFIED TYPE: ICD-10-CM

## 2024-10-25 PROCEDURE — 99214 OFFICE O/P EST MOD 30 MIN: CPT | Mod: PBBFAC

## 2024-10-25 PROCEDURE — 99999 PR PBB SHADOW E&M-EST. PATIENT-LVL IV: CPT | Mod: PBBFAC,,,

## 2024-10-25 NOTE — PROGRESS NOTES
Gastroenterology Clinic Note    Patient ID: [id number]   Referring MD: Melida Abel*   Chief Complaint:   Chief Complaint   Patient presents with    Gastroesophageal Reflux       History of Present Illness   Syed Casanova is an 39 y.o. WM who is referred for GERD.  Patient reports a history of GERD for many years.  Symptoms have been progressively worsening.  He has tried multiple over-the-counter medications along with Protonix daily without relief in his symptoms.  He has had H. pylori twice and completed therapy with no change or improvement in his symptoms.  No dysphagia.  He has postprandial nausea and bloating.  He has been experiencing chest discomfort along with palpitations.  He is a current smoker and is trying to stop.  He rarely takes NSAIDs.  Drinks alcohol on occasion.  No prior EGD reported.  He did have 1 episode of what he describes as melena over the past several weeks.  Otherwise no constipation or diarrhea or blood in stool.  No recent cardiac evaluation.  No family history of CRC reported.    Review of Systems   Constitutional:  Positive for weight loss.   Gastrointestinal:  Positive for abdominal pain, heartburn, melena and nausea. Negative for blood in stool, constipation, diarrhea and vomiting.       Past Medical History    No past medical history on file.    Past Surgical History     Past Surgical History:   Procedure Laterality Date    APPENDECTOMY         Allergies   Review of patient's allergies indicates:  No Known Allergies    Immunization History     There is no immunization history on file for this patient.    Past Family History      Family History   Problem Relation Name Age of Onset    No Known Problems Mother      No Known Problems Father         Past Social History      Social History     Socioeconomic History    Marital status:    Tobacco Use    Smoking status: Every Day     Types: Cigarettes     Passive exposure: Never    Smokeless tobacco: Never   Substance and  "Sexual Activity    Alcohol use: Yes     Comment: socially    Drug use: Never    Sexual activity: Yes       Current Medications     Outpatient Medications Marked as Taking for the 10/25/24 encounter (Office Visit) with Roxanne Smith FNP   Medication Sig Dispense Refill    buPROPion (WELLBUTRIN XL) 150 MG TB24 tablet Take 1 tablet (150 mg total) by mouth once daily. 30 tablet 11    tadalafiL (CIALIS) 5 MG tablet Take 5 mg by mouth every evening.      testosterone cypionate (DEPOTESTOTERONE CYPIONATE) 200 mg/mL injection Inject 200 mg into the muscle every 28 days.          I have reviewed the current medications, allergies, vital signs, past medical and surgical history, family medical history, and social history for this encounter and agree with all findings.    OBJECTIVE    Physical Exam    BP (!) 133/92   Pulse 85   Ht 5' 7" (1.702 m)   Wt 94.6 kg (208 lb 9.6 oz)   SpO2 98%   BMI 32.67 kg/m²   GEN: Well appearing, cooperative, NAD  NECK: Supple, no LAD  CV: Normal rate  RESP: Unlabored  ABD: ND, no guarding  EXT: No clubbing, cyanosis, or edema  SKIN: Warm and dry  NEURO: AAO x4.     LABS    CBC (with or without Differential):   Lab Results   Component Value Date    WBC 10.74 07/22/2024    HGB 17.9 07/22/2024    HCT 52.9 07/22/2024    MCV 89.7 07/22/2024    MCH 30.3 07/22/2024    MCHC 33.8 07/22/2024    RDW 12.1 07/22/2024     07/22/2024    MPV 10.9 07/22/2024    NEUTOPHILPCT 71.9 (H) 07/22/2024    DIFFTYPE Auto 07/22/2024     BMP/CMP:   Lab Results   Component Value Date     07/22/2024    K 5.2 (H) 07/22/2024     07/22/2024    CO2 27 07/22/2024    BUN 14 07/22/2024    CREATININE 1.25 07/22/2024    GLU 72 (L) 07/22/2024    CALCIUM 9.6 07/22/2024    ALBUMIN 4.0 07/22/2024    AST 13 (L) 07/22/2024    ALT 34 07/22/2024    ALKPHOS 60 07/22/2024        IMAGING  No pertinent imaging available.    ASSESSMENT  Syed Casanova is a 39 y.o. WM with history of GERD who is referred for GERD.    1. " Gastroesophageal reflux disease, unspecified whether esophagitis present    2. Nausea    3. Melena           PLAN    - schedule EGD for GERD that has failed to respond to PPI therapy, nausea, bloating; rule out PUD; discussed procedure with patient, including risks and benefits, patient verbalized understanding  - schedule gallbladder ultrasound to rule out cholelithiasis with postprandial nausea and bloating  - referred to Cardiology for chest pain and palpitations    There are no Patient Instructions on file for this visit.      Orders Placed This Encounter   Procedures    US Abdomen Limited_Gallbladder     Standing Status:   Future     Standing Expiration Date:   10/25/2025     Order Specific Question:   May the Radiologist modify the order per protocol to meet the clinical needs of the patient?     Answer:   Yes     Order Specific Question:   Release to patient     Answer:   Immediate    CBC Auto Differential     Standing Status:   Future     Standing Expiration Date:   12/24/2025    Comprehensive Metabolic Panel     Standing Status:   Future     Standing Expiration Date:   12/24/2025         The risks and benefits of my recommendations, as well as other treatment options were discussed with the patient today. All questions were answered.    45 minutes of total time spent on the encounter, which includes face to face time and non-face to face time preparing to see the patient (eg, review of tests), obtaining and/or reviewing separately obtained history, documenting clinical information in the electronic or other health record, Independently interpreting results (not separately reported) and communicating results to the patient/family/caregiver, or care coordination (not separately reported).        Roxanne Smith, FNP/ACNP  [facility]

## 2024-11-05 ENCOUNTER — HOSPITAL ENCOUNTER (OUTPATIENT)
Dept: RADIOLOGY | Facility: HOSPITAL | Age: 39
Discharge: HOME | End: 2024-11-05
Payer: COMMERCIAL

## 2024-11-05 ENCOUNTER — OFFICE VISIT (OUTPATIENT)
Dept: CARDIOLOGY | Facility: CLINIC | Age: 39
End: 2024-11-05
Payer: COMMERCIAL

## 2024-11-05 ENCOUNTER — HOSPITAL ENCOUNTER (OUTPATIENT)
Facility: HOSPITAL | Age: 39
Discharge: HOME | End: 2024-11-07
Attending: EMERGENCY MEDICINE | Admitting: INTERNAL MEDICINE
Payer: COMMERCIAL

## 2024-11-05 VITALS
HEIGHT: 67 IN | WEIGHT: 212 LBS | DIASTOLIC BLOOD PRESSURE: 110 MMHG | HEART RATE: 95 BPM | BODY MASS INDEX: 33.27 KG/M2 | SYSTOLIC BLOOD PRESSURE: 130 MMHG | OXYGEN SATURATION: 97 %

## 2024-11-05 DIAGNOSIS — F17.213 CIGARETTE NICOTINE DEPENDENCE WITH WITHDRAWAL: ICD-10-CM

## 2024-11-05 DIAGNOSIS — R07.9 CHEST PAIN: ICD-10-CM

## 2024-11-05 DIAGNOSIS — M25.511 PAIN IN JOINT OF RIGHT SHOULDER: ICD-10-CM

## 2024-11-05 DIAGNOSIS — M54.2 NECK PAIN: ICD-10-CM

## 2024-11-05 DIAGNOSIS — I20.9 ANGINA, CLASS III: ICD-10-CM

## 2024-11-05 DIAGNOSIS — G47.33 OBSTRUCTIVE SLEEP APNEA: ICD-10-CM

## 2024-11-05 DIAGNOSIS — I50.9 NEW ONSET OF CONGESTIVE HEART FAILURE: Primary | ICD-10-CM

## 2024-11-05 DIAGNOSIS — I50.22 HEART FAILURE WITH MID-RANGE EJECTION FRACTION: ICD-10-CM

## 2024-11-05 DIAGNOSIS — I20.0 UNSTABLE ANGINA: ICD-10-CM

## 2024-11-05 DIAGNOSIS — R07.9 CHEST PAIN, UNSPECIFIED TYPE: ICD-10-CM

## 2024-11-05 DIAGNOSIS — F17.210 CIGARETTE NICOTINE DEPENDENCE WITHOUT COMPLICATION: ICD-10-CM

## 2024-11-05 DIAGNOSIS — R11.0 NAUSEA: ICD-10-CM

## 2024-11-05 DIAGNOSIS — R00.2 PALPITATIONS: ICD-10-CM

## 2024-11-05 DIAGNOSIS — U07.1 COVID-19: ICD-10-CM

## 2024-11-05 DIAGNOSIS — R07.9 CHEST PAIN, UNSPECIFIED TYPE: Primary | ICD-10-CM

## 2024-11-05 LAB
ALBUMIN SERPL BCP-MCNC: 3.8 G/DL (ref 3.5–5)
ALBUMIN/GLOB SERPL: 1 {RATIO}
ALP SERPL-CCNC: 61 U/L (ref 45–115)
ALT SERPL W P-5'-P-CCNC: 37 U/L (ref 16–61)
ANION GAP SERPL CALCULATED.3IONS-SCNC: 8 MMOL/L (ref 7–16)
AST SERPL W P-5'-P-CCNC: 22 U/L (ref 15–37)
BASOPHILS # BLD AUTO: 0.06 K/UL (ref 0–0.2)
BASOPHILS NFR BLD AUTO: 0.6 % (ref 0–1)
BILIRUB SERPL-MCNC: 0.3 MG/DL (ref ?–1.2)
BUN SERPL-MCNC: 21 MG/DL (ref 7–18)
BUN/CREAT SERPL: 16 (ref 6–20)
CALCIUM SERPL-MCNC: 8.9 MG/DL (ref 8.5–10.1)
CHLORIDE SERPL-SCNC: 108 MMOL/L (ref 98–107)
CO2 SERPL-SCNC: 25 MMOL/L (ref 21–32)
CREAT SERPL-MCNC: 1.33 MG/DL (ref 0.7–1.3)
D DIMER PPP FEU-MCNC: 0.38 ΜG/ML (ref 0–0.47)
DIFFERENTIAL METHOD BLD: ABNORMAL
EGFR (NO RACE VARIABLE) (RUSH/TITUS): 69 ML/MIN/1.73M2
EOSINOPHIL # BLD AUTO: 0.16 K/UL (ref 0–0.5)
EOSINOPHIL NFR BLD AUTO: 1.6 % (ref 1–4)
ERYTHROCYTE [DISTWIDTH] IN BLOOD BY AUTOMATED COUNT: 12.9 % (ref 11.5–14.5)
GLOBULIN SER-MCNC: 3.7 G/DL (ref 2–4)
GLUCOSE SERPL-MCNC: 82 MG/DL (ref 74–106)
HCT VFR BLD AUTO: 48.4 % (ref 40–54)
HGB BLD-MCNC: 15.8 G/DL (ref 13.5–18)
IMM GRANULOCYTES # BLD AUTO: 0.02 K/UL (ref 0–0.04)
IMM GRANULOCYTES NFR BLD: 0.2 % (ref 0–0.4)
INR BLD: 0.94
LYMPHOCYTES # BLD AUTO: 3.52 K/UL (ref 1–4.8)
LYMPHOCYTES NFR BLD AUTO: 35.4 % (ref 27–41)
MCH RBC QN AUTO: 30.7 PG (ref 27–31)
MCHC RBC AUTO-ENTMCNC: 32.6 G/DL (ref 32–36)
MCV RBC AUTO: 94 FL (ref 80–96)
MONOCYTES # BLD AUTO: 0.76 K/UL (ref 0–0.8)
MONOCYTES NFR BLD AUTO: 7.6 % (ref 2–6)
MPC BLD CALC-MCNC: 10.4 FL (ref 9.4–12.4)
NEUTROPHILS # BLD AUTO: 5.43 K/UL (ref 1.8–7.7)
NEUTROPHILS NFR BLD AUTO: 54.6 % (ref 53–65)
NRBC # BLD AUTO: 0 X10E3/UL
NRBC, AUTO (.00): 0 %
NT-PROBNP SERPL-MCNC: <5 PG/ML (ref 1–125)
PLATELET # BLD AUTO: 292 K/UL (ref 150–400)
POTASSIUM SERPL-SCNC: 4.3 MMOL/L (ref 3.5–5.1)
PROT SERPL-MCNC: 7.5 G/DL (ref 6.4–8.2)
PROTHROMBIN TIME: 12.5 SECONDS (ref 11.7–14.7)
RBC # BLD AUTO: 5.15 M/UL (ref 4.6–6.2)
SODIUM SERPL-SCNC: 137 MMOL/L (ref 136–145)
TROPONIN I SERPL DL<=0.01 NG/ML-MCNC: 4.8 PG/ML
TROPONIN I SERPL DL<=0.01 NG/ML-MCNC: <4 PG/ML
WBC # BLD AUTO: 9.95 K/UL (ref 4.5–11)

## 2024-11-05 PROCEDURE — 93010 ELECTROCARDIOGRAM REPORT: CPT | Mod: S$PBB,,, | Performed by: INTERNAL MEDICINE

## 2024-11-05 PROCEDURE — 25000003 PHARM REV CODE 250: Performed by: EMERGENCY MEDICINE

## 2024-11-05 PROCEDURE — 93005 ELECTROCARDIOGRAM TRACING: CPT | Mod: PBBFAC | Performed by: INTERNAL MEDICINE

## 2024-11-05 PROCEDURE — 85025 COMPLETE CBC W/AUTO DIFF WBC: CPT | Performed by: EMERGENCY MEDICINE

## 2024-11-05 PROCEDURE — 85379 FIBRIN DEGRADATION QUANT: CPT | Performed by: EMERGENCY MEDICINE

## 2024-11-05 PROCEDURE — G0378 HOSPITAL OBSERVATION PER HR: HCPCS

## 2024-11-05 PROCEDURE — 80053 COMPREHEN METABOLIC PANEL: CPT | Performed by: EMERGENCY MEDICINE

## 2024-11-05 PROCEDURE — 99999 PR PBB SHADOW E&M-EST. PATIENT-LVL IV: CPT | Mod: PBBFAC,,, | Performed by: INTERNAL MEDICINE

## 2024-11-05 PROCEDURE — 83880 ASSAY OF NATRIURETIC PEPTIDE: CPT | Performed by: EMERGENCY MEDICINE

## 2024-11-05 PROCEDURE — 94761 N-INVAS EAR/PLS OXIMETRY MLT: CPT

## 2024-11-05 PROCEDURE — 3080F DIAST BP >= 90 MM HG: CPT | Mod: ,,, | Performed by: INTERNAL MEDICINE

## 2024-11-05 PROCEDURE — 84484 ASSAY OF TROPONIN QUANT: CPT | Performed by: EMERGENCY MEDICINE

## 2024-11-05 PROCEDURE — 3075F SYST BP GE 130 - 139MM HG: CPT | Mod: ,,, | Performed by: INTERNAL MEDICINE

## 2024-11-05 PROCEDURE — 25000003 PHARM REV CODE 250

## 2024-11-05 PROCEDURE — 76705 ECHO EXAM OF ABDOMEN: CPT | Mod: TC

## 2024-11-05 PROCEDURE — 36415 COLL VENOUS BLD VENIPUNCTURE: CPT | Performed by: EMERGENCY MEDICINE

## 2024-11-05 PROCEDURE — 99205 OFFICE O/P NEW HI 60 MIN: CPT | Mod: S$PBB,,, | Performed by: INTERNAL MEDICINE

## 2024-11-05 PROCEDURE — 3008F BODY MASS INDEX DOCD: CPT | Mod: ,,, | Performed by: INTERNAL MEDICINE

## 2024-11-05 PROCEDURE — 85610 PROTHROMBIN TIME: CPT

## 2024-11-05 PROCEDURE — 93010 ELECTROCARDIOGRAM REPORT: CPT | Mod: 76,,, | Performed by: INTERNAL MEDICINE

## 2024-11-05 PROCEDURE — 99214 OFFICE O/P EST MOD 30 MIN: CPT | Mod: PBBFAC,25 | Performed by: INTERNAL MEDICINE

## 2024-11-05 PROCEDURE — 99223 1ST HOSP IP/OBS HIGH 75: CPT | Mod: ,,, | Performed by: INTERNAL MEDICINE

## 2024-11-05 PROCEDURE — 94799 UNLISTED PULMONARY SVC/PX: CPT

## 2024-11-05 PROCEDURE — 76705 ECHO EXAM OF ABDOMEN: CPT | Mod: 26,,, | Performed by: RADIOLOGY

## 2024-11-05 PROCEDURE — 99900035 HC TECH TIME PER 15 MIN (STAT)

## 2024-11-05 PROCEDURE — 93005 ELECTROCARDIOGRAM TRACING: CPT

## 2024-11-05 PROCEDURE — 99285 EMERGENCY DEPT VISIT HI MDM: CPT | Mod: 25

## 2024-11-05 RX ORDER — NAPROXEN SODIUM 220 MG/1
81 TABLET, FILM COATED ORAL DAILY
Status: DISCONTINUED | OUTPATIENT
Start: 2024-11-06 | End: 2024-11-07

## 2024-11-05 RX ORDER — METOPROLOL TARTRATE 25 MG/1
25 TABLET, FILM COATED ORAL 2 TIMES DAILY
Status: DISCONTINUED | OUTPATIENT
Start: 2024-11-05 | End: 2024-11-05

## 2024-11-05 RX ORDER — ENOXAPARIN SODIUM 100 MG/ML
40 INJECTION SUBCUTANEOUS EVERY 24 HOURS
Status: DISCONTINUED | OUTPATIENT
Start: 2024-11-05 | End: 2024-11-07 | Stop reason: HOSPADM

## 2024-11-05 RX ORDER — OMEPRAZOLE 20 MG/1
20 CAPSULE, DELAYED RELEASE ORAL DAILY
COMMUNITY
End: 2024-12-11 | Stop reason: HOSPADM

## 2024-11-05 RX ORDER — IBUPROFEN 200 MG
24 TABLET ORAL
Status: DISCONTINUED | OUTPATIENT
Start: 2024-11-05 | End: 2024-11-07 | Stop reason: HOSPADM

## 2024-11-05 RX ORDER — LIDOCAINE HYDROCHLORIDE 20 MG/ML
15 SOLUTION OROPHARYNGEAL ONCE
Status: COMPLETED | OUTPATIENT
Start: 2024-11-05 | End: 2024-11-05

## 2024-11-05 RX ORDER — CALCIUM CARBONATE 300MG(750)
1 TABLET,CHEWABLE ORAL DAILY
COMMUNITY

## 2024-11-05 RX ORDER — GLUCAGON 1 MG
1 KIT INJECTION
Status: DISCONTINUED | OUTPATIENT
Start: 2024-11-05 | End: 2024-11-07 | Stop reason: HOSPADM

## 2024-11-05 RX ORDER — SODIUM CHLORIDE 0.9 % (FLUSH) 0.9 %
10 SYRINGE (ML) INJECTION EVERY 12 HOURS PRN
Status: DISCONTINUED | OUTPATIENT
Start: 2024-11-05 | End: 2024-11-07 | Stop reason: HOSPADM

## 2024-11-05 RX ORDER — ASPIRIN 325 MG
325 TABLET ORAL
Status: DISCONTINUED | OUTPATIENT
Start: 2024-11-05 | End: 2024-11-05

## 2024-11-05 RX ORDER — ACETAMINOPHEN 325 MG/1
650 TABLET ORAL EVERY 4 HOURS PRN
Status: DISCONTINUED | OUTPATIENT
Start: 2024-11-05 | End: 2024-11-07 | Stop reason: HOSPADM

## 2024-11-05 RX ORDER — METOPROLOL TARTRATE 25 MG/1
25 TABLET, FILM COATED ORAL 2 TIMES DAILY
Status: DISCONTINUED | OUTPATIENT
Start: 2024-11-06 | End: 2024-11-07 | Stop reason: HOSPADM

## 2024-11-05 RX ORDER — METOPROLOL TARTRATE 25 MG/1
25 TABLET, FILM COATED ORAL
Status: COMPLETED | OUTPATIENT
Start: 2024-11-05 | End: 2024-11-05

## 2024-11-05 RX ORDER — POLYETHYLENE GLYCOL 3350 17 G/17G
17 POWDER, FOR SOLUTION ORAL 2 TIMES DAILY PRN
Status: DISCONTINUED | OUTPATIENT
Start: 2024-11-05 | End: 2024-11-07 | Stop reason: HOSPADM

## 2024-11-05 RX ORDER — ATORVASTATIN CALCIUM 80 MG/1
80 TABLET, FILM COATED ORAL NIGHTLY
Status: DISCONTINUED | OUTPATIENT
Start: 2024-11-05 | End: 2024-11-07

## 2024-11-05 RX ORDER — NALOXONE HCL 0.4 MG/ML
0.02 VIAL (ML) INJECTION
Status: DISCONTINUED | OUTPATIENT
Start: 2024-11-05 | End: 2024-11-07 | Stop reason: HOSPADM

## 2024-11-05 RX ORDER — IBUPROFEN 200 MG
16 TABLET ORAL
Status: DISCONTINUED | OUTPATIENT
Start: 2024-11-05 | End: 2024-11-07 | Stop reason: HOSPADM

## 2024-11-05 RX ORDER — MULTIVITAMIN
1 TABLET ORAL DAILY
COMMUNITY

## 2024-11-05 RX ORDER — ACETAMINOPHEN 325 MG/1
650 TABLET ORAL
Status: COMPLETED | OUTPATIENT
Start: 2024-11-05 | End: 2024-11-05

## 2024-11-05 RX ORDER — ALUMINUM HYDROXIDE, MAGNESIUM HYDROXIDE, AND SIMETHICONE 1200; 120; 1200 MG/30ML; MG/30ML; MG/30ML
30 SUSPENSION ORAL ONCE
Status: COMPLETED | OUTPATIENT
Start: 2024-11-05 | End: 2024-11-05

## 2024-11-05 RX ORDER — ONDANSETRON HYDROCHLORIDE 2 MG/ML
8 INJECTION, SOLUTION INTRAVENOUS EVERY 8 HOURS PRN
Status: DISCONTINUED | OUTPATIENT
Start: 2024-11-05 | End: 2024-11-07 | Stop reason: HOSPADM

## 2024-11-05 RX ORDER — PANTOPRAZOLE SODIUM 40 MG/1
40 TABLET, DELAYED RELEASE ORAL DAILY
Status: DISCONTINUED | OUTPATIENT
Start: 2024-11-06 | End: 2024-11-07 | Stop reason: HOSPADM

## 2024-11-05 RX ORDER — SODIUM CHLORIDE 0.9 % (FLUSH) 0.9 %
10 SYRINGE (ML) INJECTION
Status: DISCONTINUED | OUTPATIENT
Start: 2024-11-05 | End: 2024-11-07 | Stop reason: HOSPADM

## 2024-11-05 RX ORDER — ASPIRIN 325 MG
325 TABLET ORAL
Status: COMPLETED | OUTPATIENT
Start: 2024-11-05 | End: 2024-11-05

## 2024-11-05 RX ADMIN — LIDOCAINE HYDROCHLORIDE 15 ML: 20 SOLUTION ORAL at 05:11

## 2024-11-05 RX ADMIN — ACETAMINOPHEN 650 MG: 325 TABLET ORAL at 05:11

## 2024-11-05 RX ADMIN — ALUMINUM HYDROXIDE, MAGNESIUM HYDROXIDE, AND DIMETHICONE 30 ML: 200; 20; 200 SUSPENSION ORAL at 05:11

## 2024-11-05 RX ADMIN — ASPIRIN 325 MG: 325 TABLET ORAL at 05:11

## 2024-11-05 RX ADMIN — ATORVASTATIN CALCIUM 80 MG: 80 TABLET, FILM COATED ORAL at 09:11

## 2024-11-05 RX ADMIN — METOPROLOL TARTRATE 25 MG: 25 TABLET, FILM COATED ORAL at 05:11

## 2024-11-05 NOTE — Clinical Note
The right groin and right radial was prepped. The site was prepped with ChloraPrep. The site was clipped.

## 2024-11-05 NOTE — ED PROVIDER NOTES
Encounter Date: 11/5/2024    SCRIBE #1 NOTE: I, Teresa Ramirez, am scribing for, and in the presence of,  Gaurav Forman MD. I have scribed the entire note.       History     Chief Complaint   Patient presents with    Chest Pain     Pt presents to ED via wheelchair from Dr. Kilgore clinic for admittance to hospital for a heart catheterization tonight or tomorrow morning. Pt reports left sided chest pain that radiates to back and occasionally down left arm. Pt describes chest pain as pressure.      This is a 38 y/o male,who presents to the ED with complaints of CP which started one month ago, but became constant over the past 2 weeks. He localizes the chest pain to the left upper chest area and radiates through to his back. There is no Hx of a shortness of breath, nausea, vomiting, or diaphoresis. He states he was at Dr. Kilgore's office when the chest pain started today and was sent down to the ED for further evaluation. He notes he was told he would be admitted for a cardiac cath. He is a current every day smoker.     The history is provided by the patient and the spouse. No  was used.     Review of patient's allergies indicates:  No Known Allergies  History reviewed. No pertinent past medical history.  Past Surgical History:   Procedure Laterality Date    APPENDECTOMY       Family History   Problem Relation Name Age of Onset    No Known Problems Mother      No Known Problems Father      Heart attack Maternal Grandfather       Social History     Tobacco Use    Smoking status: Every Day     Types: Cigarettes     Passive exposure: Never    Smokeless tobacco: Never   Substance Use Topics    Alcohol use: Yes     Comment: socially    Drug use: Never     Review of Systems   Constitutional:  Negative for diaphoresis.   Respiratory:  Negative for shortness of breath.    Cardiovascular:  Positive for chest pain. Negative for palpitations and leg swelling.   Gastrointestinal:  Negative for nausea and  vomiting.   All other systems reviewed and are negative.      Physical Exam     Initial Vitals [11/05/24 1636]   BP Pulse Resp Temp SpO2   (!) 140/95 75 13 98.1 °F (36.7 °C) 99 %      MAP       --         Physical Exam    Nursing note and vitals reviewed.  Constitutional: He appears well-developed and well-nourished.   HENT:   Head: Normocephalic and atraumatic.   Eyes: EOM are normal. Pupils are equal, round, and reactive to light.   Neck: Neck supple. No thyromegaly present. No JVD present.   Normal range of motion.  Cardiovascular:  Normal rate, regular rhythm, normal heart sounds and intact distal pulses.           No murmur heard.  Pulmonary/Chest: Breath sounds normal. No stridor. No respiratory distress. He has no wheezes. He exhibits tenderness (Left chest wall tenderness.).   Abdominal: Abdomen is soft. Bowel sounds are normal. He exhibits no distension. There is no abdominal tenderness.   Musculoskeletal:         General: Tenderness (Left parascapular tenderness.) present. No edema. Normal range of motion.      Cervical back: Normal range of motion and neck supple.     Lymphadenopathy:     He has no cervical adenopathy.   Neurological: He is alert and oriented to person, place, and time. He has normal strength. No cranial nerve deficit or sensory deficit. GCS score is 15. GCS eye subscore is 4. GCS verbal subscore is 5. GCS motor subscore is 6.   Skin: Skin is warm and dry. Capillary refill takes less than 2 seconds. No rash noted.   Psychiatric: He has a normal mood and affect.         ED Course   Procedures  Labs Reviewed   CBC W/ AUTO DIFFERENTIAL    Narrative:     The following orders were created for panel order CBC auto differential.  Procedure                               Abnormality         Status                     ---------                               -----------         ------                     CBC with Differential[[id number]]                           In process                   Please  view results for these tests on the individual orders.   COMPREHENSIVE METABOLIC PANEL   TROPONIN I   TROPONIN I   NT-PRO NATRIURETIC PEPTIDE   CBC WITH DIFFERENTIAL          Imaging Results    None          Medications - No data to display  Medical Decision Making            Attending Attestation:           Physician Attestation for Scribe:  Physician Attestation Statement for Scribe #1: I, Gaurav Forman MD, reviewed documentation, as scribed by Teresa Ramirez in my presence, and it is both accurate and complete.                                    Clinical Impression:  Final diagnoses:  [R07.9] Chest pain               ms    Sinus rhythm  Normal ECG    Confirmed by Anselmo Kilgore DO ([id number]) on 11/11/2024 5:02:11 PM    Referred By: [id number]   SELF           Confirmed By:Anselmo Kilgore DO                                  Imaging Results              X-Ray Chest 1 View (Final result)  Result time 11/05/24 17:35:59      Final result by Les Collins MD (11/05/24 17:35:59)                   Impression:      No acute process.      Electronically signed by: Les Collins MD  Date:    11/05/2024  Time:    17:35               Narrative:    EXAMINATION:  XR CHEST 1 VIEW    CLINICAL HISTORY:  Chest pain, unspecified    TECHNIQUE:  Single frontal view of the chest was performed.    COMPARISON:  01/24/2023.    FINDINGS:  Monitoring EKG leads are present.  The trachea is unremarkable.  The cardiomediastinal silhouette is within normal limits.  The hilar structures are unremarkable.  There is no evidence of free air beneath the hemidiaphragms.  There are no pleural effusions.  There is no evidence of a pneumothorax.  There is no evidence of pneumomediastinum.  No airspace opacity is present.  The osseous structures are unremarkable.                                       Medications   0.9%  NaCl infusion ( Intravenous Not Given 11/7/24 1130)   acetaminophen tablet 650 mg (650 mg Oral Given 11/5/24 1734)   aspirin tablet 325 mg (325 mg Oral Given 11/5/24 1734)   metoprolol tartrate (LOPRESSOR) tablet 25 mg (25 mg Oral Given 11/5/24 1734)   aluminum-magnesium hydroxide-simethicone 200-200-20 mg/5 mL suspension 30 mL (30 mLs Oral Given 11/5/24 1735)     And   LIDOcaine viscous HCl 2% oral solution 15 mL (15 mLs Oral Given 11/5/24 1735)   clonazePAM tablet 0.5 mg (0.5 mg Oral Given 11/6/24 1151)     Medical Decision Making            Attending Attestation:           Physician Attestation for Scribe:  Physician Attestation Statement for Scribe #1: I, Gaurav Forman MD, reviewed documentation, as scribed by Teresa Ramirez in my presence, and  it is both accurate and complete.             ED Course as of 11/20/24 1111   e Nov 05, 2024   [id number] Discussed with hospitalist about need to admit patient for chest pain.  Chest pain in his been for the past 2 days.  Patient saw Dr. Kilgore in clinic today recommend he be admitted to the hospitalist for expected heart catheterization tomorrow.  EKG showed no acute ischemic changes.  Troponin is pending.   [PK]      ED Course User Index  [PK] Gaurav Forman MD                           Clinical Impression:  Final diagnoses:  [R07.9] Chest pain          ED Disposition Condition    Observation                 Gaurav Forman MD  11/20/24 1112

## 2024-11-05 NOTE — PROGRESS NOTES
PCP: Anselmo Cornell MD    Referring Provider:     Subjective:   Syed Casanova is a 39 y.o. male with hx of low T, ED  who presents for chest pain    Pt reports left sided chest pain, worse with activity, 6/10, associated with shortness of breath, provoked by stress and anxiety, associated with palpitations and shortness of breath. .  He is unsure if symptoms are related to anxiety or chest pain produces anxiety.  Chest pain lasts seconds to several minutes, improves with rest.  He first noted symptoms several weeks ago. He is active, able to perform normal activities of daily living without provocation of chest pain, pressure or shortness of breath, however if tries to walk fast or climb stairs will provoke symptoms.           Fhx:  Family History   Problem Relation Name Age of Onset    No Known Problems Mother      No Known Problems Father      Heart attack Maternal Grandfather       Past Surgical History:   Procedure Laterality Date    APPENDECTOMY      LEFT HEART CATHETERIZATION Left 11/7/2024    Procedure: Left heart cath;  Surgeon: Lenny Leon MD;  Location: [facility] CATH LAB;  Service: Cardiology;  Laterality: Left;   '  Shx:   Past Surgical History:   Procedure Laterality Date    APPENDECTOMY      LEFT HEART CATHETERIZATION Left 11/7/2024    Procedure: Left heart cath;  Surgeon: Lenny Leon MD;  Location: [facility] CATH LAB;  Service: Cardiology;  Laterality: Left;            ECHO - No results found for this or any previous visit.       CATH - No results found for this or any previous visit.       Stress - No results found for this or any previous visit.       Lab Results   Component Value Date     11/22/2024    K 4.4 11/22/2024     11/22/2024    CO2 28 11/22/2024    BUN 18 11/22/2024    CREATININE 1.12 11/22/2024    CALCIUM 9.1 11/22/2024    ANIONGAP 9 11/22/2024    ESTGFRAFRICA 68 09/15/2020    EGFRNONAA 56 09/15/2020       Lab Results   Component Value Date    CHOL 117  11/06/2024    CHOL 140 07/22/2024     Lab Results   Component Value Date    HDL 35 (L) 11/06/2024    HDL 38 (L) 07/22/2024     Lab Results   Component Value Date    LDLCALC 65 11/06/2024    LDLCALC 71 07/22/2024     Lab Results   Component Value Date    TRIG 83 11/06/2024    TRIG 154 (H) 07/22/2024     Lab Results   Component Value Date    CHOLHDL 3.3 11/06/2024    CHOLHDL 3.7 07/22/2024       Lab Results   Component Value Date    WBC 9.87 11/11/2024    HGB 16.1 11/11/2024    HCT 47.0 11/11/2024    MCV 88.8 11/11/2024     11/11/2024           Current Outpatient Medications:     busPIRone (BUSPAR) 10 MG tablet, Take 1 tablet (10 mg total) by mouth 2 (two) times daily., Disp: 60 tablet, Rfl: 11    empagliflozin (JARDIANCE) 10 mg tablet, Take 1 tablet (10 mg total) by mouth once daily., Disp: 90 tablet, Rfl: 1    losartan (COZAAR) 25 MG tablet, Take 1 tablet (25 mg total) by mouth once daily., Disp: 90 tablet, Rfl: 3    magnesium oxide 400 mg magnesium Tab, Take 1 tablet by mouth once daily., Disp: , Rfl:     metoprolol succinate (TOPROL-XL) 25 MG 24 hr tablet, Take 1 tablet (25 mg total) by mouth once daily., Disp: 90 tablet, Rfl: 3    multivitamin (THERAGRAN) per tablet, Take 1 tablet by mouth once daily., Disp: , Rfl:     pantoprazole (PROTONIX) 40 MG tablet, Take 1 tablet (40 mg total) by mouth once daily., Disp: 90 tablet, Rfl: 3    Review of Systems   Constitutional: Negative for diaphoresis, malaise/fatigue, night sweats and weight gain.   HENT:  Negative for congestion, ear pain, hearing loss, nosebleeds and sore throat.    Eyes:  Negative for blurred vision, double vision, pain, photophobia and visual disturbance.   Cardiovascular:  Positive for chest pain and palpitations. Negative for claudication, dyspnea on exertion, irregular heartbeat, leg swelling, near-syncope, orthopnea and syncope.   Respiratory:  Negative for cough, shortness of breath, sleep disturbances due to breathing, snoring and  "wheezing.    Endocrine: Negative for cold intolerance, heat intolerance, polydipsia, polyphagia and polyuria.   Hematologic/Lymphatic: Negative for bleeding problem. Does not bruise/bleed easily.   Skin:  Negative for dry skin, flushing, itching, rash and skin cancer.   Musculoskeletal:  Negative for arthritis, back pain, falls, joint pain, muscle cramps, muscle weakness and myalgias.        Oa hands and neck.    Gastrointestinal:  Negative for abdominal pain, change in bowel habit, constipation, diarrhea, dysphagia, heartburn, nausea and vomiting.   Genitourinary:  Negative for bladder incontinence, dysuria, flank pain, frequency and nocturia.        Improved on Cialis, discontinued due to anxiety. M    Neurological:  Negative for dizziness, focal weakness, headaches, light-headedness, loss of balance, numbness, paresthesias and seizures.   Psychiatric/Behavioral:  Negative for depression, memory loss and substance abuse. The patient is not nervous/anxious.    Allergic/Immunologic: Negative for environmental allergies.          Objective:   BP (!) 130/110 (BP Location: Left arm, Patient Position: Sitting)   Pulse 95   Ht 5' 7" (1.702 m)   Wt 96.2 kg (212 lb)   SpO2 97%   BMI 33.20 kg/m²       Physical Exam  Vitals and nursing note reviewed.   Constitutional:       Appearance: Normal appearance.   HENT:      Head: Normocephalic and atraumatic.      Right Ear: External ear normal.      Left Ear: External ear normal.   Eyes:      General: No scleral icterus.        Right eye: No discharge.         Left eye: No discharge.      Extraocular Movements: Extraocular movements intact.      Conjunctiva/sclera: Conjunctivae normal.      Pupils: Pupils are equal, round, and reactive to light.   Cardiovascular:      Rate and Rhythm: Normal rate and regular rhythm.      Pulses: Normal pulses.      Heart sounds: Normal heart sounds. No murmur heard.     No friction rub. No gallop.   Pulmonary:      Effort: Pulmonary effort is " normal.      Breath sounds: Normal breath sounds. No wheezing, rhonchi or rales.   Chest:      Chest wall: No tenderness.   Abdominal:      General: Abdomen is flat. Bowel sounds are normal. There is no distension.      Palpations: Abdomen is soft.      Tenderness: There is no abdominal tenderness. There is no guarding or rebound.   Musculoskeletal:         General: No swelling or tenderness. Normal range of motion.      Cervical back: Normal range of motion and neck supple.   Skin:     General: Skin is warm and dry.      Findings: No erythema or rash.   Neurological:      General: No focal deficit present.      Mental Status: He is alert and oriented to person, place, and time.      Cranial Nerves: No cranial nerve deficit.      Motor: No weakness.      Gait: Gait normal.   Psychiatric:         Mood and Affect: Mood normal.         Behavior: Behavior normal.         Thought Content: Thought content normal.         Judgment: Judgment normal.       Assessment:     1. Chest pain, unspecified type  Ambulatory referral/consult to Cardiology    EKG 12-lead    EKG 12-lead    Chest pain suggestive of unstable angina, will refer to ER, check cardiac enzemes, anticipate LHC/poss next step in chest pain evaluation      2. Cigarette nicotine dependence without complication      discussed lifestlye changes, smoking cessation      3. Neck pain        4. Pain in joint of right shoulder        5. COVID-19              Plan:   Follow up in two to three weeks pending results of LHC/ER evaluation.

## 2024-11-05 NOTE — PHARMACY MED REC
"Admission Medication History     The home medication history was taken by Carolyn Camarena.    You may go to "Admission" then "Reconcile Home Medications" tabs to review and/or act upon these items.     The home medication list has been updated by the Pharmacy department.   Please read ALL comments highlighted in yellow.   Please address this information as you see fit.    Feel free to contact us if you have any questions or require assistance.  Medications Added:  Omeprazole 20 mg  Multivitamin  Magnesium Oxide 400 mg      Patient reports no longer taking the following medication(s). The medication(s) listed below were removed from the home medication list. Please reorder if appropriate:  Bupropion  mg  Pantoprazole 40 mg  Tadalafil 5 mg  Testosterone cypionate 200 mg/ml    Medications listed below were obtained from: Patient/family and Analytic software- [employer]  (Not in a hospital admission)        Current Outpatient Medications on File Prior to Encounter   Medication Sig Dispense Refill Last Dose/Taking    magnesium oxide 400 mg magnesium Tab Take 1 tablet by mouth once daily.   11/4/2024    multivitamin (THERAGRAN) per tablet Take 1 tablet by mouth once daily.   11/4/2024    omeprazole (PRILOSEC) 20 MG capsule Take 20 mg by mouth once daily.   11/4/2024    [DISCONTINUED] buPROPion (WELLBUTRIN XL) 150 MG TB24 tablet Take 1 tablet (150 mg total) by mouth once daily. 30 tablet 11     [DISCONTINUED] pantoprazole (PROTONIX) 40 MG tablet Take 1 tablet (40 mg total) by mouth once daily. (Patient not taking: Reported on 10/25/2024) 90 tablet 3     [DISCONTINUED] tadalafiL (CIALIS) 5 MG tablet Take 5 mg by mouth every evening.       [DISCONTINUED] testosterone cypionate (DEPOTESTOTERONE CYPIONATE) 200 mg/mL injection Inject 200 mg into the muscle every 28 days.            Potential issues to be addressed PRIOR TO DISCHARGE  No issues identified.    Carolyn Camarena  Medication Access Specialist  EXT. [phone]    .          "

## 2024-11-06 ENCOUNTER — TELEPHONE (OUTPATIENT)
Dept: GASTROENTEROLOGY | Facility: CLINIC | Age: 39
End: 2024-11-06
Payer: COMMERCIAL

## 2024-11-06 DIAGNOSIS — R11.2 NAUSEA AND VOMITING, UNSPECIFIED VOMITING TYPE: ICD-10-CM

## 2024-11-06 DIAGNOSIS — R11.0 NAUSEA: Primary | ICD-10-CM

## 2024-11-06 DIAGNOSIS — R93.5 ABNORMAL ULTRASOUND OF ABDOMEN: ICD-10-CM

## 2024-11-06 PROBLEM — R00.2 PALPITATIONS: Status: ACTIVE | Noted: 2024-11-06

## 2024-11-06 PROBLEM — I20.0 UNSTABLE ANGINA: Status: ACTIVE | Noted: 2024-11-06

## 2024-11-06 PROBLEM — I50.22 HEART FAILURE WITH MID-RANGE EJECTION FRACTION: Status: ACTIVE | Noted: 2024-11-06

## 2024-11-06 PROBLEM — I20.9 ANGINA, CLASS III: Status: ACTIVE | Noted: 2024-11-06

## 2024-11-06 PROBLEM — G47.33 OBSTRUCTIVE SLEEP APNEA: Status: ACTIVE | Noted: 2024-11-06

## 2024-11-06 PROBLEM — I50.9 NEW ONSET OF CONGESTIVE HEART FAILURE: Status: ACTIVE | Noted: 2024-11-06

## 2024-11-06 LAB
ALBUMIN SERPL BCP-MCNC: 3.3 G/DL (ref 3.5–5)
ALBUMIN/GLOB SERPL: 1.1 {RATIO}
ALP SERPL-CCNC: 54 U/L (ref 45–115)
ALT SERPL W P-5'-P-CCNC: 32 U/L (ref 16–61)
ANION GAP SERPL CALCULATED.3IONS-SCNC: 6 MMOL/L (ref 7–16)
AORTIC ROOT ANNULUS: 3.68 CM
AORTIC VALVE CUSP SEPERATION: 2.22 CM
AST SERPL W P-5'-P-CCNC: 14 U/L (ref 15–37)
AV INDEX (PROSTH): 0.7
AV MEAN GRADIENT: 2.9 MMHG
AV PEAK GRADIENT: 5.8 MMHG
AV VALVE AREA BY VELOCITY RATIO: 2.5 CM²
AV VALVE AREA: 2.7 CM²
AV VELOCITY RATIO: 0.67
BASOPHILS # BLD AUTO: 0.04 K/UL (ref 0–0.2)
BASOPHILS NFR BLD AUTO: 0.4 % (ref 0–1)
BILIRUB SERPL-MCNC: 0.4 MG/DL (ref ?–1.2)
BSA FOR ECHO PROCEDURE: 2.13 M2
BUN SERPL-MCNC: 20 MG/DL (ref 7–18)
BUN/CREAT SERPL: 16 (ref 6–20)
CALCIUM SERPL-MCNC: 8.5 MG/DL (ref 8.5–10.1)
CHLORIDE SERPL-SCNC: 109 MMOL/L (ref 98–107)
CHOLEST SERPL-MCNC: 117 MG/DL (ref 0–200)
CHOLEST/HDLC SERPL: 3.3 {RATIO}
CO2 SERPL-SCNC: 29 MMOL/L (ref 21–32)
CREAT SERPL-MCNC: 1.24 MG/DL (ref 0.7–1.3)
CV ECHO LV RWT: 0.41 CM
DIFFERENTIAL METHOD BLD: ABNORMAL
DOP CALC AO PEAK VEL: 1.2 M/S
DOP CALC AO VTI: 24 CM
DOP CALC LVOT AREA: 3.8 CM2
DOP CALC LVOT DIAMETER: 2.2 CM
DOP CALC LVOT PEAK VEL: 0.8 M/S
DOP CALC LVOT STROKE VOLUME: 63.8 CM3
DOP CALCLVOT PEAK VEL VTI: 16.8 CM
E WAVE DECELERATION TIME: 205.73 MSEC
E/A RATIO: 1.19
E/E' RATIO: 7.62 M/S
ECHO LV POSTERIOR WALL: 0.9 CM (ref 0.6–1.1)
EGFR (NO RACE VARIABLE) (RUSH/TITUS): 75 ML/MIN/1.73M2
EJECTION FRACTION: 40 %
EOSINOPHIL # BLD AUTO: 0.18 K/UL (ref 0–0.5)
EOSINOPHIL NFR BLD AUTO: 1.9 % (ref 1–4)
ERYTHROCYTE [DISTWIDTH] IN BLOOD BY AUTOMATED COUNT: 12.6 % (ref 11.5–14.5)
FRACTIONAL SHORTENING: 27.3 % (ref 28–44)
GLOBULIN SER-MCNC: 3.1 G/DL (ref 2–4)
GLUCOSE SERPL-MCNC: 103 MG/DL (ref 74–106)
HCT VFR BLD AUTO: 45.1 % (ref 40–54)
HDLC SERPL-MCNC: 35 MG/DL (ref 40–60)
HGB BLD-MCNC: 14.5 G/DL (ref 13.5–18)
IMM GRANULOCYTES # BLD AUTO: 0.03 K/UL (ref 0–0.04)
IMM GRANULOCYTES NFR BLD: 0.3 % (ref 0–0.4)
INTERVENTRICULAR SEPTUM: 1 CM (ref 0.6–1.1)
LDLC SERPL CALC-MCNC: 65 MG/DL
LDLC/HDLC SERPL: 1.9 {RATIO}
LEFT ATRIUM AREA SYSTOLIC (APICAL 2 CHAMBER): 13.77 CM2
LEFT ATRIUM AREA SYSTOLIC (APICAL 4 CHAMBER): 11.03 CM2
LEFT ATRIUM VOLUME INDEX MOD: 13 ML/M2
LEFT ATRIUM VOLUME MOD: 26.99 ML
LEFT INTERNAL DIMENSION IN SYSTOLE: 3.2 CM (ref 2.1–4)
LEFT VENTRICLE DIASTOLIC VOLUME INDEX: 43.36 ML/M2
LEFT VENTRICLE DIASTOLIC VOLUME: 89.75 ML
LEFT VENTRICLE END SYSTOLIC VOLUME APICAL 2 CHAMBER: 32.35 ML
LEFT VENTRICLE END SYSTOLIC VOLUME APICAL 4 CHAMBER: 22.07 ML
LEFT VENTRICLE MASS INDEX: 66.6 G/M2
LEFT VENTRICLE SYSTOLIC VOLUME INDEX: 19.4 ML/M2
LEFT VENTRICLE SYSTOLIC VOLUME: 40.11 ML
LEFT VENTRICULAR INTERNAL DIMENSION IN DIASTOLE: 4.4 CM (ref 3.5–6)
LEFT VENTRICULAR MASS: 137.8 G
LV LATERAL E/E' RATIO: 6.67 M/S
LV SEPTAL E/E' RATIO: 8.89 M/S
LVED V (TEICH): 89.75 ML
LVES V (TEICH): 40.11 ML
LVOT MG: 1.38 MMHG
LVOT MV: 0.55 CM/S
LYMPHOCYTES # BLD AUTO: 2.86 K/UL (ref 1–4.8)
LYMPHOCYTES NFR BLD AUTO: 30.8 % (ref 27–41)
MCH RBC QN AUTO: 30 PG (ref 27–31)
MCHC RBC AUTO-ENTMCNC: 32.2 G/DL (ref 32–36)
MCV RBC AUTO: 93.2 FL (ref 80–96)
MONOCYTES # BLD AUTO: 0.77 K/UL (ref 0–0.8)
MONOCYTES NFR BLD AUTO: 8.3 % (ref 2–6)
MPC BLD CALC-MCNC: 10.3 FL (ref 9.4–12.4)
MV PEAK A VEL: 0.67 M/S
MV PEAK E VEL: 0.8 M/S
NEUTROPHILS # BLD AUTO: 5.42 K/UL (ref 1.8–7.7)
NEUTROPHILS NFR BLD AUTO: 58.3 % (ref 53–65)
NONHDLC SERPL-MCNC: 82 MG/DL
NRBC # BLD AUTO: 0 X10E3/UL
NRBC, AUTO (.00): 0 %
OHS CV RV/LV RATIO: 0.5 CM
PLATELET # BLD AUTO: 255 K/UL (ref 150–400)
POTASSIUM SERPL-SCNC: 4 MMOL/L (ref 3.5–5.1)
PROT SERPL-MCNC: 6.4 G/DL (ref 6.4–8.2)
PV PEAK GRADIENT: 3 MMHG
PV PEAK VELOCITY: 0.81 M/S
RA VOL SYS: 21.42 ML
RBC # BLD AUTO: 4.84 M/UL (ref 4.6–6.2)
RIGHT ATRIAL AREA: 11.4 CM2
RIGHT ATRIUM VOLUME AREA LENGTH APICAL 4 CHAMBER: 20.98 ML
RIGHT VENTRICLE DIASTOLIC BASEL DIMENSION: 2.2 CM
RIGHT VENTRICLE DIASTOLIC LENGTH: 7.1 CM
RIGHT VENTRICLE DIASTOLIC MID DIMENSION: 2 CM
RIGHT VENTRICULAR LENGTH IN DIASTOLE (APICAL 4-CHAMBER VIEW): 7.13 CM
RV MID DIAMA: 2.03 CM
SODIUM SERPL-SCNC: 140 MMOL/L (ref 136–145)
TDI LATERAL: 0.12 M/S
TDI SEPTAL: 0.09 M/S
TDI: 0.11 M/S
TRIGL SERPL-MCNC: 83 MG/DL (ref 35–150)
TROPONIN I SERPL DL<=0.01 NG/ML-MCNC: 5 PG/ML
VLDLC SERPL-MCNC: 17 MG/DL
WBC # BLD AUTO: 9.3 K/UL (ref 4.5–11)
Z-SCORE OF LEFT VENTRICULAR DIMENSION IN END DIASTOLE: -3.58
Z-SCORE OF LEFT VENTRICULAR DIMENSION IN END SYSTOLE: -1.46

## 2024-11-06 PROCEDURE — 80053 COMPREHEN METABOLIC PANEL: CPT

## 2024-11-06 PROCEDURE — 25000003 PHARM REV CODE 250

## 2024-11-06 PROCEDURE — 25000003 PHARM REV CODE 250: Performed by: NURSE PRACTITIONER

## 2024-11-06 PROCEDURE — 36415 COLL VENOUS BLD VENIPUNCTURE: CPT | Mod: 91

## 2024-11-06 PROCEDURE — 94761 N-INVAS EAR/PLS OXIMETRY MLT: CPT

## 2024-11-06 PROCEDURE — 85025 COMPLETE CBC W/AUTO DIFF WBC: CPT

## 2024-11-06 PROCEDURE — 99215 OFFICE O/P EST HI 40 MIN: CPT | Mod: 25,FS,, | Performed by: HOSPITALIST

## 2024-11-06 PROCEDURE — 84484 ASSAY OF TROPONIN QUANT: CPT

## 2024-11-06 PROCEDURE — 80061 LIPID PANEL: CPT

## 2024-11-06 PROCEDURE — G0378 HOSPITAL OBSERVATION PER HR: HCPCS

## 2024-11-06 PROCEDURE — 36415 COLL VENOUS BLD VENIPUNCTURE: CPT

## 2024-11-06 PROCEDURE — 99232 SBSQ HOSP IP/OBS MODERATE 35: CPT | Mod: ,,,

## 2024-11-06 RX ORDER — CLONAZEPAM 0.5 MG/1
0.5 TABLET ORAL ONCE
Status: COMPLETED | OUTPATIENT
Start: 2024-11-06 | End: 2024-11-06

## 2024-11-06 RX ORDER — LOSARTAN POTASSIUM 25 MG/1
25 TABLET ORAL DAILY
Status: DISCONTINUED | OUTPATIENT
Start: 2024-11-06 | End: 2024-11-07 | Stop reason: HOSPADM

## 2024-11-06 RX ADMIN — LOSARTAN POTASSIUM 25 MG: 25 TABLET, FILM COATED ORAL at 11:11

## 2024-11-06 RX ADMIN — ASPIRIN 81 MG CHEWABLE TABLET 81 MG: 81 TABLET CHEWABLE at 08:11

## 2024-11-06 RX ADMIN — CLONAZEPAM 0.5 MG: 0.5 TABLET ORAL at 11:11

## 2024-11-06 RX ADMIN — PANTOPRAZOLE SODIUM 40 MG: 40 TABLET, DELAYED RELEASE ORAL at 08:11

## 2024-11-06 RX ADMIN — METOPROLOL TARTRATE 25 MG: 25 TABLET, FILM COATED ORAL at 10:11

## 2024-11-06 RX ADMIN — ATORVASTATIN CALCIUM 80 MG: 80 TABLET, FILM COATED ORAL at 10:11

## 2024-11-06 RX ADMIN — METOPROLOL TARTRATE 25 MG: 25 TABLET, FILM COATED ORAL at 08:11

## 2024-11-06 NOTE — TELEPHONE ENCOUNTER
----- Message from MARY JO Trent sent at 11/6/2024  9:19 AM CST -----  His gallbladder is normal on this study but there is some concern for abnormality within the middle of his abdomen on this US and they recommend a CT for further evaluation so I am ordering this.

## 2024-11-06 NOTE — HOSPITAL COURSE
11/6  - planned for LHC today, held NPO  - had episode of chest pain this morning, 5/10, EKG without changes, VSS  - will DC once cardio clears    11/7  - LHC clean  - discussed with cardio, continue losartan, BB, jardiance, no MRA due to soft BP, stopped asa and statin  - follow up placed

## 2024-11-06 NOTE — PROGRESS NOTES
[facility] - [address] [place] [address] Medicine  Progress Note    Patient Name: Syed Casanova  MRN: [id number]  Patient Class: OP- Observation   Admission Date: 11/5/2024  Length of Stay: 0 days  Attending Physician: Constance Negron MD  Primary Care Provider: Melida Abel MD        Subjective:     Principal Problem:Chest pain        HPI:  Patient is a 40 yo M who presents to [facility] with complaints of chest pain and heart paliptations. He was seen by Dr. Kilgore office earlier today that told the patient to come to the ED to get admitted for  [facility] tomorrow.     Initial vital signs in the ED HR 75, /95, Temp 98.1, Spo2 99%. Initial lab values were WBC 9.95, H/H 15.8/48.4, Platelets 292, PT 12.5 INR 0.94, D-dimer 0.38, Na 137, K 4.3, Cl 108, Co2 25, BUN/Cr 21/1.33, Glu 82, pro BNP ,5, Troponin <4.0, CXR unremarkable. Patient was loaded with asa in the ed, and started on BB.     This patient will be admitted for observation at [facility].     Overview/Hospital Course:  11/6  - planned for [facility] today, held NPO  - had episode of chest pain this morning, 5/10, EKG without changes, VSS  - will DC once cardio clears        Review of Systems   Constitutional:  Negative for chills and fatigue.   Respiratory:  Negative for shortness of breath.    Cardiovascular:  Positive for chest pain and palpitations.   Gastrointestinal:  Negative for abdominal pain, diarrhea, nausea and vomiting.   Neurological:  Negative for weakness and headaches.     Objective:     Vital Signs (Most Recent):  Temp: 98.2 °F (36.8 °C) (11/06/24 0704)  Pulse: 65 (11/06/24 0704)  Resp: 18 (11/06/24 0704)  BP: 117/79 (11/06/24 0704)  SpO2: 98 % (11/06/24 0841) Vital Signs (24h Range):  Temp:  [97.6 °F (36.4 °C)-98.2 °F (36.8 °C)] 98.2 °F (36.8 °C)  Pulse:  [64-95] 65  Resp:  [10-19] 18  SpO2:  [94 %-99 %] 98 %  BP: (117-140)/() 117/79     Weight: 96.2 kg (212 lb)  Body mass index is  33.2 kg/m².     Physical Exam  Vitals and nursing note reviewed.   Constitutional:       Appearance: Normal appearance.   HENT:      Head: Normocephalic.      Right Ear: External ear normal.      Left Ear: External ear normal.      Nose: Nose normal.      Mouth/Throat:      Pharynx: Oropharynx is clear.   Eyes:      Conjunctiva/sclera: Conjunctivae normal.   Cardiovascular:      Rate and Rhythm: Normal rate and regular rhythm.      Pulses: Normal pulses.      Heart sounds: Normal heart sounds.   Pulmonary:      Effort: Pulmonary effort is normal.      Breath sounds: Normal breath sounds.   Abdominal:      General: Abdomen is flat.      Palpations: Abdomen is soft.   Musculoskeletal:      Right lower leg: No edema.      Left lower leg: No edema.   Skin:     General: Skin is warm.   Neurological:      Mental Status: He is alert and oriented to person, place, and time.   Psychiatric:         Mood and Affect: Mood normal.         Behavior: Behavior normal.         Thought Content: Thought content normal.         Judgment: Judgment normal.                Significant Labs: All pertinent labs within the past 24 hours have been reviewed.  CBC:   Recent Labs   Lab 11/05/24 1650 11/06/24 0415   WBC 9.95 9.30   HGB 15.8 14.5   HCT 48.4 45.1    255     CMP:   Recent Labs   Lab 11/05/24 1650 11/06/24 0415    140   K 4.3 4.0   * 109*   CO2 25 29   GLU 82 103   BUN 21* 20*   CREATININE 1.33* 1.24   CALCIUM 8.9 8.5   PROT 7.5 6.4   ALBUMIN 3.8 3.3*   BILITOT 0.3 0.4   ALKPHOS 61 54   AST 22 14*   ALT 37 32   ANIONGAP 8 6*     Troponin:   Recent Labs   Lab 11/05/24 1650 11/05/24 1956   TROPONINIHS <4.0 4.8       Significant Imaging: I have reviewed all pertinent imaging results/findings within the past 24 hours.    Assessment/Plan:      * Chest pain  Initial tropoinin was <4.0, will trend, EKG Normal sinus rhythm, Patient was seen by Dr. Kilgore today and plans to do a LHC tomorrow, will start BB, High  intensity statin, and asa. Echo pending.   NORA score 1  Heart Score 2  Cardiology consulted NPO after midnight.       11/6  - episode of CP this am, planning for cath today  - pending cardio recs    Nicotine dependence  Dangers of cigarette smoking were reviewed with patient in detail. Patient was Counseled for 3-10 minutes. Nicotine replacement options were discussed. Nicotine replacement was discussed- not prescribed as it is medically contra-indicated      VTE Risk Mitigation (From admission, onward)           Ordered     enoxaparin injection 40 mg  Daily         11/05/24 1820     IP VTE HIGH RISK PATIENT  Once         11/05/24 1820     Place sequential compression device  Until discontinued         11/05/24 1820                    Discharge Planning   JERRY:      Code Status: Full Code   Is the patient medically ready for discharge?:     Reason for patient still in hospital (select all that apply): Treatment                     Constance Negron MD  Department of Hospital Medicine   [facility] - [facility]

## 2024-11-06 NOTE — SUBJECTIVE & OBJECTIVE
Review of Systems   Constitutional:  Negative for chills and fatigue.   Respiratory:  Negative for shortness of breath.    Cardiovascular:  Positive for chest pain and palpitations.   Gastrointestinal:  Negative for abdominal pain, diarrhea, nausea and vomiting.   Neurological:  Negative for weakness and headaches.     Objective:     Vital Signs (Most Recent):  Temp: 98.2 °F (36.8 °C) (11/06/24 0704)  Pulse: 65 (11/06/24 0704)  Resp: 18 (11/06/24 0704)  BP: 117/79 (11/06/24 0704)  SpO2: 98 % (11/06/24 0841) Vital Signs (24h Range):  Temp:  [97.6 °F (36.4 °C)-98.2 °F (36.8 °C)] 98.2 °F (36.8 °C)  Pulse:  [64-95] 65  Resp:  [10-19] 18  SpO2:  [94 %-99 %] 98 %  BP: (117-140)/() 117/79     Weight: 96.2 kg (212 lb)  Body mass index is 33.2 kg/m².     Physical Exam  Vitals and nursing note reviewed.   Constitutional:       Appearance: Normal appearance.   HENT:      Head: Normocephalic.      Right Ear: External ear normal.      Left Ear: External ear normal.      Nose: Nose normal.      Mouth/Throat:      Pharynx: Oropharynx is clear.   Eyes:      Conjunctiva/sclera: Conjunctivae normal.   Cardiovascular:      Rate and Rhythm: Normal rate and regular rhythm.      Pulses: Normal pulses.      Heart sounds: Normal heart sounds.   Pulmonary:      Effort: Pulmonary effort is normal.      Breath sounds: Normal breath sounds.   Abdominal:      General: Abdomen is flat.      Palpations: Abdomen is soft.   Musculoskeletal:      Right lower leg: No edema.      Left lower leg: No edema.   Skin:     General: Skin is warm.   Neurological:      Mental Status: He is alert and oriented to person, place, and time.   Psychiatric:         Mood and Affect: Mood normal.         Behavior: Behavior normal.         Thought Content: Thought content normal.         Judgment: Judgment normal.                Significant Labs: All pertinent labs within the past 24 hours have been reviewed.  CBC:   Recent Labs   Lab 11/05/24  1650  11/06/24 0415   WBC 9.95 9.30   HGB 15.8 14.5   HCT 48.4 45.1    255     CMP:   Recent Labs   Lab 11/05/24 1650 11/06/24 0415    140   K 4.3 4.0   * 109*   CO2 25 29   GLU 82 103   BUN 21* 20*   CREATININE 1.33* 1.24   CALCIUM 8.9 8.5   PROT 7.5 6.4   ALBUMIN 3.8 3.3*   BILITOT 0.3 0.4   ALKPHOS 61 54   AST 22 14*   ALT 37 32   ANIONGAP 8 6*     Troponin:   Recent Labs   Lab 11/05/24 1650 11/05/24 1956   TROPONINIHS <4.0 4.8       Significant Imaging: I have reviewed all pertinent imaging results/findings within the past 24 hours.

## 2024-11-06 NOTE — ASSESSMENT & PLAN NOTE
Dangers of cigarette smoking were reviewed with patient in detail. Patient was Counseled for 3-10 minutes. Nicotine replacement options were discussed. Nicotine replacement was discussed- not prescribed as it is medically contra-indicated

## 2024-11-06 NOTE — PROGRESS NOTES
[facility]  Wound Care    Patient Name:  Syed Casanova   MRN:  [id number]  Date: 11/6/2024  Diagnosis: Chest pain    History:     History reviewed. No pertinent past medical history.    Social History     Socioeconomic History    Marital status:    Tobacco Use    Smoking status: Every Day     Types: Cigarettes     Passive exposure: Never    Smokeless tobacco: Never   Substance and Sexual Activity    Alcohol use: Yes     Comment: socially    Drug use: Never    Sexual activity: Yes     Social Drivers of Health     Financial Resource Strain: Low Risk  (11/6/2024)    Overall Financial Resource Strain (CARDIA)     Difficulty of Paying Living Expenses: Not hard at all   Food Insecurity: No Food Insecurity (11/6/2024)    Hunger Vital Sign     Worried About Running Out of Food in the Last Year: Never true     Ran Out of Food in the Last Year: Never true   Transportation Needs: No Transportation Needs (11/6/2024)    TRANSPORTATION NEEDS     Transportation : No   Physical Activity: Inactive (11/6/2024)    Exercise Vital Sign     Days of Exercise per Week: 0 days     Minutes of Exercise per Session: 0 min   Stress: Stress Concern Present (11/6/2024)    [employer] [place] [employer] - Occupational Stress Questionnaire     Feeling of Stress : To some extent   Housing Stability: Low Risk  (11/6/2024)    Housing Stability Vital Sign     Unable to Pay for Housing in the Last Year: No     Homeless in the Last Year: No       Precautions:     Allergies as of 11/05/2024    (No Known Allergies)       [facility] Assessment Details/Treatment   Narrative: Seen patient for initial preventative skin care measures.  Patient ambulates.  No foam borders, open wounds, or redness noted to bilateral heels and sacral.  Consult wound care of any findings.      11/06/2024

## 2024-11-06 NOTE — HPI
40 y/o male with no significant PMH other than current smoker who presented to Dr. Kilgore clinic yesterday for new patient for chest pain had abnormal EKG and symptoms concerning for ACS. He was sent to ED for further evaluation and likely LHC.    Patient reports intermittent chest pain and palpitations over the past couple of weeks. Troponin negative x 2. Echo with EF 40%.

## 2024-11-06 NOTE — SUBJECTIVE & OBJECTIVE
History reviewed. No pertinent past medical history.    Past Surgical History:   Procedure Laterality Date    APPENDECTOMY         Review of patient's allergies indicates:  No Known Allergies    No current facility-administered medications on file prior to encounter.     Current Outpatient Medications on File Prior to Encounter   Medication Sig    magnesium oxide 400 mg magnesium Tab Take 1 tablet by mouth once daily.    multivitamin (THERAGRAN) per tablet Take 1 tablet by mouth once daily.    omeprazole (PRILOSEC) 20 MG capsule Take 20 mg by mouth once daily.    [DISCONTINUED] buPROPion (WELLBUTRIN XL) 150 MG TB24 tablet Take 1 tablet (150 mg total) by mouth once daily.    [DISCONTINUED] pantoprazole (PROTONIX) 40 MG tablet Take 1 tablet (40 mg total) by mouth once daily. (Patient not taking: Reported on 10/25/2024)    [DISCONTINUED] tadalafiL (CIALIS) 5 MG tablet Take 5 mg by mouth every evening.    [DISCONTINUED] testosterone cypionate (DEPOTESTOTERONE CYPIONATE) 200 mg/mL injection Inject 200 mg into the muscle every 28 days.     Family History       Problem Relation (Age of Onset)    Heart attack Maternal Grandfather    No Known Problems Mother, Father          Tobacco Use    Smoking status: Every Day     Types: Cigarettes     Passive exposure: Never    Smokeless tobacco: Never   Substance and Sexual Activity    Alcohol use: Yes     Comment: socially    Drug use: Never    Sexual activity: Yes     Review of Systems   Constitutional:  Negative for chills and fatigue.   Respiratory:  Negative for shortness of breath.    Cardiovascular:  Positive for chest pain and palpitations.   Gastrointestinal:  Negative for abdominal pain, diarrhea, nausea and vomiting.   Neurological:  Negative for weakness and headaches.     Objective:     Vital Signs (Most Recent):  Temp: 98.1 °F (36.7 °C) (11/05/24 1636)  Pulse: 71 (11/05/24 1838)  Resp: 10 (11/05/24 1838)  BP: 123/89 (11/05/24 1838)  SpO2: 96 % (11/05/24 1838) Vital Signs  (24h Range):  Temp:  [98.1 °F (36.7 °C)] 98.1 °F (36.7 °C)  Pulse:  [71-95] 71  Resp:  [10-18] 10  SpO2:  [95 %-99 %] 96 %  BP: (123-140)/() 123/89     Weight: 96.2 kg (212 lb)  Body mass index is 33.2 kg/m².     Physical Exam  Vitals and nursing note reviewed.   Constitutional:       Appearance: Normal appearance.   HENT:      Head: Normocephalic.      Right Ear: External ear normal.      Left Ear: External ear normal.      Nose: Nose normal.      Mouth/Throat:      Pharynx: Oropharynx is clear.   Eyes:      Conjunctiva/sclera: Conjunctivae normal.   Cardiovascular:      Rate and Rhythm: Normal rate and regular rhythm.      Pulses: Normal pulses.      Heart sounds: Normal heart sounds.   Pulmonary:      Effort: Pulmonary effort is normal.      Breath sounds: Normal breath sounds.   Abdominal:      General: Abdomen is flat.      Palpations: Abdomen is soft.   Musculoskeletal:      Right lower leg: No edema.      Left lower leg: No edema.   Skin:     General: Skin is warm.   Neurological:      Mental Status: He is alert and oriented to person, place, and time.   Psychiatric:         Mood and Affect: Mood normal.         Behavior: Behavior normal.         Thought Content: Thought content normal.         Judgment: Judgment normal.                Significant Labs: All pertinent labs within the past 24 hours have been reviewed.    Significant Imaging: I have reviewed all pertinent imaging results/findings within the past 24 hours.

## 2024-11-06 NOTE — SUBJECTIVE & OBJECTIVE
History reviewed. No pertinent past medical history.    Past Surgical History:   Procedure Laterality Date    APPENDECTOMY         Review of patient's allergies indicates:  No Known Allergies    No current facility-administered medications on file prior to encounter.     Current Outpatient Medications on File Prior to Encounter   Medication Sig    magnesium oxide 400 mg magnesium Tab Take 1 tablet by mouth once daily.    multivitamin (THERAGRAN) per tablet Take 1 tablet by mouth once daily.    omeprazole (PRILOSEC) 20 MG capsule Take 20 mg by mouth once daily.     Family History       Problem Relation (Age of Onset)    Heart attack Maternal Grandfather    No Known Problems Mother, Father          Tobacco Use    Smoking status: Every Day     Types: Cigarettes     Passive exposure: Never    Smokeless tobacco: Never   Substance and Sexual Activity    Alcohol use: Yes     Comment: socially    Drug use: Never    Sexual activity: Yes     Review of Systems   Constitutional: Negative for chills and fever.   HENT: Negative.     Eyes: Negative.    Cardiovascular:  Positive for chest pain and palpitations. Negative for dyspnea on exertion, leg swelling and orthopnea.   Respiratory: Negative.     Gastrointestinal:  Positive for heartburn. Negative for nausea and vomiting.   Genitourinary: Negative.    Neurological: Negative.      Objective:     Vital Signs (Most Recent):  Temp: 98.2 °F (36.8 °C) (11/06/24 0704)  Pulse: 65 (11/06/24 0704)  Resp: 18 (11/06/24 0704)  BP: 117/79 (11/06/24 0704)  SpO2: 98 % (11/06/24 0841) Vital Signs (24h Range):  Temp:  [97.6 °F (36.4 °C)-98.2 °F (36.8 °C)] 98.2 °F (36.8 °C)  Pulse:  [64-95] 65  Resp:  [10-19] 18  SpO2:  [94 %-99 %] 98 %  BP: (117-140)/() 117/79     Weight: 96.2 kg (212 lb)  Body mass index is 33.2 kg/m².    SpO2: 98 %       No intake or output data in the 24 hours ending 11/06/24 1036    Lines/Drains/Airways       Peripheral Intravenous Line  Duration                   "Peripheral IV - Single Lumen 11/05/24 1650 20 G 1 1/4 in No Anterior;Left;Proximal Forearm <1 day                     Physical Exam  Vitals reviewed.   Constitutional:       General: He is not in acute distress.  HENT:      Nose: Nose normal.      Mouth/Throat:      Mouth: Mucous membranes are moist.      Pharynx: Oropharynx is clear.   Eyes:      General: No scleral icterus.     Pupils: Pupils are equal, round, and reactive to light.   Cardiovascular:      Rate and Rhythm: Normal rate and regular rhythm.      Heart sounds: Normal heart sounds.   Pulmonary:      Effort: Pulmonary effort is normal.      Breath sounds: Normal breath sounds.   Abdominal:      General: Bowel sounds are normal.      Palpations: Abdomen is soft.   Musculoskeletal:      Right lower leg: No edema.      Left lower leg: No edema.   Skin:     General: Skin is warm and dry.   Neurological:      Mental Status: He is alert and oriented to person, place, and time.          Significant Labs: ABG: No results for input(s): "PH", "PCO2", "HCO3", "POCSATURATED", "BE" in the last 48 hours., Blood Culture: No results for input(s): "LABBLOO" in the last 48 hours., BMP:   Recent Labs   Lab 11/05/24 1650 11/06/24  0415   GLU 82 103    140   K 4.3 4.0   * 109*   CO2 25 29   BUN 21* 20*   CREATININE 1.33* 1.24   CALCIUM 8.9 8.5   , CMP   Recent Labs   Lab 11/05/24 1650 11/06/24  0415    140   K 4.3 4.0   * 109*   CO2 25 29   GLU 82 103   BUN 21* 20*   CREATININE 1.33* 1.24   CALCIUM 8.9 8.5   PROT 7.5 6.4   ALBUMIN 3.8 3.3*   BILITOT 0.3 0.4   ALKPHOS 61 54   AST 22 14*   ALT 37 32   ANIONGAP 8 6*   , CBC   Recent Labs   Lab 11/05/24 1650 11/06/24  0415   WBC 9.95 9.30   HGB 15.8 14.5   HCT 48.4 45.1    255   , Lipid Panel   Recent Labs   Lab 11/06/24 0415   CHOL 117   HDL 35*   LDLCALC 65   TRIG 83   CHOLHDL 3.3   , and Troponin No results for input(s): "TROPONINI" in the last 48 hours.    Significant Imaging: Cardiac Cath: " No results found for this or any previous visit.     , Echocardiogram: Transthoracic echo (TTE) complete (Cupid Only):   Results for orders placed or performed during the hospital encounter of 11/05/24   Echo   Result Value Ref Range    BSA 2.13 m2    LVOT stroke volume 63.8 cm3    LVIDd 4.4 3.5 - 6.0 cm    LV Systolic Volume 40.11 mL    LV Systolic Volume Index 19.4 mL/m2    LVIDs 3.2 2.1 - 4.0 cm    LV ESV A2C 32.35 mL    LV Diastolic Volume 89.75 mL    LV ESV A4C 22.07 mL    LV Diastolic Volume Index 43.36 mL/m2    Left Ventricular End Systolic Volume by Teichholz Method 40.11 mL    Left Ventricular End Diastolic Volume by Teichholz Method 89.75 mL    IVS 1.0 0.6 - 1.1 cm    LVOT diameter 2.2 cm    LVOT area 3.8 cm2    FS 27.3 (A) 28 - 44 %    Left Ventricle Relative Wall Thickness 0.41 cm    PW 0.9 0.6 - 1.1 cm    LV mass 137.8 g    LV Mass Index 66.6 g/m2    MV Peak E Chris 0.80 m/s    TDI LATERAL 0.12 m/s    TDI SEPTAL 0.09 m/s    E/E' ratio 7.62 m/s    MV Peak A Chris 0.67 m/s    E/A ratio 1.19     E wave deceleration time 205.73 msec    LV SEPTAL E/E' RATIO 8.89 m/s    LV LATERAL E/E' RATIO 6.67 m/s    LVOT peak chris 0.8 m/s    Left Ventricular Outflow Tract Mean Velocity 0.55 cm/s    Left Ventricular Outflow Tract Mean Gradient 1.38 mmHg    RV- villanueva basal diam 2.2 cm    RV-villanueva mid d 2.0 cm    RV Basal Diameter 7.13 cm    RV-villanueva length 7.1 cm    RV mid diameter 2.03 cm    RV/LV Ratio 0.50 cm    LA Vol (MOD) 26.99 mL    LAURA (MOD) 13.0 mL/m2    RA area length vol 20.98 mL    RA Area 11.4 cm2    RA Vol 21.42 mL    AV mean gradient 2.9 mmHg    AV peak gradient 5.8 mmHg    Ao peak chris 1.2 m/s    Ao VTI 24.0 cm    LVOT peak VTI 16.8 cm    AV valve area 2.7 cm²    AV Velocity Ratio 0.67     AV index (prosthetic) 0.70     LACI by Velocity Ratio 2.5 cm²    PV PEAK VELOCITY 0.81 m/s    PV peak gradient 3 mmHg    Ao root annulus 3.68 cm    Mean e' 0.11 m/s    ZLVIDS -1.46     ZLVIDD -3.58     LA area A4C 11.03 cm2    LA  area A2C 13.77 cm2    AORTIC VALVE CUSP SEPERATION 2.22 cm    EF 40 %    Narrative      Left Ventricle: The left ventricle is normal in size. Normal wall   thickness. Regional wall motion abnormalities present. There is moderately   reduced systolic function. Ejection fraction is approximately 40%.    Right Ventricle: Normal right ventricular cavity size. Systolic   function is normal.     , EKG: No results found for this or any previous visit. , and X-Ray: CXR: X-Ray Chest 1 View (CXR):   Results for orders placed or performed during the hospital encounter of 11/05/24   X-Ray Chest 1 View    Narrative    EXAMINATION:  XR CHEST 1 VIEW    CLINICAL HISTORY:  Chest pain, unspecified    TECHNIQUE:  Single frontal view of the chest was performed.    COMPARISON:  01/24/2023.    FINDINGS:  Monitoring EKG leads are present.  The trachea is unremarkable.  The cardiomediastinal silhouette is within normal limits.  The hilar structures are unremarkable.  There is no evidence of free air beneath the hemidiaphragms.  There are no pleural effusions.  There is no evidence of a pneumothorax.  There is no evidence of pneumomediastinum.  No airspace opacity is present.  The osseous structures are unremarkable.      Impression    No acute process.      Electronically signed by: Les Collins MD  Date:    11/05/2024  Time:    17:35

## 2024-11-06 NOTE — ASSESSMENT & PLAN NOTE
Initial tropoinin was <4.0, will trend, EKG Normal sinus rhythm, Patient was seen by Dr. Kilgore today and plans to do a LHC tomorrow, will start BB, High intensity statin, and asa. Echo pending.   NORA score 1  Heart Score 2  Cardiology consulted NPO after midnight.

## 2024-11-06 NOTE — ASSESSMENT & PLAN NOTE
"Patient has Systolic (HFrEF) heart failure that is Acute. On presentation their CHF was well compensated. Most recent BNP and echo results are listed below.  No results for input(s): "BNP" in the last 72 hours.  Latest ECHO  Results for orders placed during the hospital encounter of 11/05/24    Echo    Interpretation Summary    Left Ventricle: The left ventricle is normal in size. Normal wall thickness. Regional wall motion abnormalities present. There is moderately reduced systolic function. Ejection fraction is approximately 40%.    Right Ventricle: Normal right ventricular cavity size. Systolic function is normal.    Current Heart Failure Medications       Plan  - Monitor strict I&Os and daily weights.    - Place on telemetry  - Low sodium diet  - Cardiology has been consulted  - The patient's volume status is at their baseline  - [facility] today. Procedure, risk and benefits discussed in detail with patient. He verbalized understanding and wishes to proceed. Consent obtained and on chart.  - Initiate GDMT  "

## 2024-11-06 NOTE — PLAN OF CARE
[facility] - [facility] Telemetry  Initial Discharge Assessment       Primary Care Provider: Melida Abel MD    Admission Diagnosis: Chest pain [R07.9]    Admission Date: 11/5/2024  Expected Discharge Date:     Transition of Care Barriers: None    Payor: [employer] [facility] / Plan: [facility] / Product Type: Commercial /     Extended Emergency Contact Information  Primary Emergency Contact: Steven Casanova  Address: [address] [place] [address]  Mobile Phone: [phone]  Relation: Spouse    Discharge Plan A: Home with family  Discharge Plan B: Home with family      [facility] - [facility] [address] [facility] [place]  [address] [facility] [place] [address]  Phone: [phone] Fax: [phone]    [facility] - [facility] [place] - [address]  [address] [facility] [address]  Phone: [phone] Fax: [phone]      Initial Assessment (most recent)       Adult Discharge Assessment - 11/06/24 0940          Discharge Assessment    Assessment Type Discharge Planning Assessment     Source of Information patient     People in Home spouse;child(laura), dependent     Facility Arrived From: Home     Do you expect to return to your current living situation? Yes     Do you have help at home or someone to help you manage your care at home? Yes     Who are your caregiver(s) and their phone number(s)? Steven Casanova - wife - [phone]     Prior to hospitilization cognitive status: Unable to Assess     Current cognitive status: Alert/Oriented     Walking or Climbing Stairs Difficulty no     Dressing/Bathing Difficulty no     Home Accessibility stairs to enter home     Number of Stairs, Main Entrance three     Equipment Currently Used at Home none     Readmission within 30 days? No     Patient currently being followed by outpatient case management? No     Do you currently have service(s) that help you manage your  care at home? No     Do you take prescription medications? No     Do you have prescription coverage? Yes     Coverage [employer] of [place]     Do you have any problems affording any of your prescribed medications? No     Who is going to help you get home at discharge? Steven Casanova - wife     How do you get to doctors appointments? car, drives self     Are you on dialysis? No     Do you take coumadin? No     Discharge Plan A Home with family     Discharge Plan B Home with family     DME Needed Upon Discharge  none     Discharge Plan discussed with: Patient     Transition of Care Barriers None        Physical Activity    On average, how many days per week do you engage in moderate to strenuous exercise (like a brisk walk)? 0 days     On average, how many minutes do you engage in exercise at this level? 0 min        Financial Resource Strain    How hard is it for you to pay for the very basics like food, housing, medical care, and heating? Not hard at all        Housing Stability    In the last 12 months, was there a time when you were not able to pay the mortgage or rent on time? No     At any time in the past 12 months, were you homeless or living in a shelter (including now)? No        Transportation Needs    Has the lack of transportation kept you from medical appointments, meetings, work or from getting things needed for daily living? No        Food Insecurity    Within the past 12 months, you worried that your food would run out before you got the money to buy more. Never true     Within the past 12 months, the food you bought just didn't last and you didn't have money to get more. Never true        Stress    Do you feel stress - tense, restless, nervous, or anxious, or unable to sleep at night because your mind is troubled all the time - these days? To some extent        Social Isolation    How often do you feel lonely or isolated from those around you?  Never        Alcohol Use    Q1: How often do you have a drink  containing alcohol? 2-4 times a month     Q2: How many drinks containing alcohol do you have on a typical day when you are drinking? 1 or 2     Q3: How often do you have six or more drinks on one occasion? Never        Utilities    In the past 12 months has the electric, gas, oil, or water company threatened to shut off services in your home? No        Health Literacy    How often do you need to have someone help you when you read instructions, pamphlets, or other written material from your doctor or pharmacy? Rarely                        CM spoke with patient at bedside.  Patient reports that he lives at home with his wife and children.  Not current with HH and uses no DME at home.  Patient plans to return home with family upon DC.  SDOH questions completed.  CM will continue to follow for DC needs as they arise.

## 2024-11-06 NOTE — TELEPHONE ENCOUNTER
Patient aware and verbalized good understanding. CT scheduled for 11/22/24 @ 8am. Patient aware to be NPO for this imaging. Patient stated that he was currently admitted to the hospital for cardiology issues and will see the date and time on his MyChart and he will let us know if we needed to reschedule this.

## 2024-11-06 NOTE — PLAN OF CARE
Problem: Adult Inpatient Plan of Care  Goal: Plan of Care Review  Outcome: Progressing  Flowsheets (Taken 11/5/2024 2128)  Plan of Care Reviewed With: patient  Goal: Optimal Comfort and Wellbeing  Outcome: Progressing  Intervention: Monitor Pain and Promote Comfort  Flowsheets (Taken 11/5/2024 2128)  Pain Management Interventions:   pillow support provided   position adjusted   pain management plan reviewed with patient/caregiver   quiet environment facilitated   relaxation techniques promoted  Intervention: Provide Person-Centered Care  Flowsheets (Taken 11/5/2024 2128)  Trust Relationship/Rapport:   care explained   choices provided   emotional support provided   empathic listening provided   questions answered   questions encouraged   reassurance provided   thoughts/feelings acknowledged

## 2024-11-06 NOTE — H&P
[facility] - Emergency Department  Hospital Medicine  History & Physical    Patient Name: Syed Casanova  MRN: [id number]  Patient Class: OP- Observation  Admission Date: 11/5/2024  Attending Physician: Charbel Lopez MD   Primary Care Provider: Melida Abel MD         Patient information was obtained from patient, past medical records, and ER records.     Subjective:     Principal Problem:Chest pain    Chief Complaint:   Chief Complaint   Patient presents with    Chest Pain     Pt presents to ED via wheelchair from Dr. Kilgore clinic for admittance to hospital for a heart catheterization tonight or tomorrow morning. Pt reports left sided chest pain that radiates to back and occasionally down left arm. Pt describes chest pain as pressure.         HPI: Patient is a 40 yo M who presents to [facility] with complaints of chest pain and heart paliptations. He was seen by Dr. Kilgore office earlier today that told the patient to come to the ED to get admitted for  LHC tomorrow.     Initial vital signs in the ED HR 75, /95, Temp 98.1, Spo2 99%. Initial lab values were WBC 9.95, H/H 15.8/48.4, Platelets 292, PT 12.5 INR 0.94, D-dimer 0.38, Na 137, K 4.3, Cl 108, Co2 25, BUN/Cr 21/1.33, Glu 82, pro BNP ,5, Troponin <4.0, CXR unremarkable. Patient was loaded with asa in the ed, and started on BB.     This patient will be admitted for observation at [facility].     History reviewed. No pertinent past medical history.    Past Surgical History:   Procedure Laterality Date    APPENDECTOMY         Review of patient's allergies indicates:  No Known Allergies    No current facility-administered medications on file prior to encounter.     Current Outpatient Medications on File Prior to Encounter   Medication Sig    magnesium oxide 400 mg magnesium Tab Take 1 tablet by mouth once daily.    multivitamin (THERAGRAN) per tablet Take 1 tablet by mouth once daily.    omeprazole  (PRILOSEC) 20 MG capsule Take 20 mg by mouth once daily.    [DISCONTINUED] buPROPion (WELLBUTRIN XL) 150 MG TB24 tablet Take 1 tablet (150 mg total) by mouth once daily.    [DISCONTINUED] pantoprazole (PROTONIX) 40 MG tablet Take 1 tablet (40 mg total) by mouth once daily. (Patient not taking: Reported on 10/25/2024)    [DISCONTINUED] tadalafiL (CIALIS) 5 MG tablet Take 5 mg by mouth every evening.    [DISCONTINUED] testosterone cypionate (DEPOTESTOTERONE CYPIONATE) 200 mg/mL injection Inject 200 mg into the muscle every 28 days.     Family History       Problem Relation (Age of Onset)    Heart attack Maternal Grandfather    No Known Problems Mother, Father          Tobacco Use    Smoking status: Every Day     Types: Cigarettes     Passive exposure: Never    Smokeless tobacco: Never   Substance and Sexual Activity    Alcohol use: Yes     Comment: socially    Drug use: Never    Sexual activity: Yes     Review of Systems   Constitutional:  Negative for chills and fatigue.   Respiratory:  Negative for shortness of breath.    Cardiovascular:  Positive for chest pain and palpitations.   Gastrointestinal:  Negative for abdominal pain, diarrhea, nausea and vomiting.   Neurological:  Negative for weakness and headaches.     Objective:     Vital Signs (Most Recent):  Temp: 98.1 °F (36.7 °C) (11/05/24 1636)  Pulse: 71 (11/05/24 1838)  Resp: 10 (11/05/24 1838)  BP: 123/89 (11/05/24 1838)  SpO2: 96 % (11/05/24 1838) Vital Signs (24h Range):  Temp:  [98.1 °F (36.7 °C)] 98.1 °F (36.7 °C)  Pulse:  [71-95] 71  Resp:  [10-18] 10  SpO2:  [95 %-99 %] 96 %  BP: (123-140)/() 123/89     Weight: 96.2 kg (212 lb)  Body mass index is 33.2 kg/m².     Physical Exam  Vitals and nursing note reviewed.   Constitutional:       Appearance: Normal appearance.   HENT:      Head: Normocephalic.      Right Ear: External ear normal.      Left Ear: External ear normal.      Nose: Nose normal.      Mouth/Throat:      Pharynx: Oropharynx is clear.    Eyes:      Conjunctiva/sclera: Conjunctivae normal.   Cardiovascular:      Rate and Rhythm: Normal rate and regular rhythm.      Pulses: Normal pulses.      Heart sounds: Normal heart sounds.   Pulmonary:      Effort: Pulmonary effort is normal.      Breath sounds: Normal breath sounds.   Abdominal:      General: Abdomen is flat.      Palpations: Abdomen is soft.   Musculoskeletal:      Right lower leg: No edema.      Left lower leg: No edema.   Skin:     General: Skin is warm.   Neurological:      Mental Status: He is alert and oriented to person, place, and time.   Psychiatric:         Mood and Affect: Mood normal.         Behavior: Behavior normal.         Thought Content: Thought content normal.         Judgment: Judgment normal.                Significant Labs: All pertinent labs within the past 24 hours have been reviewed.    Significant Imaging: I have reviewed all pertinent imaging results/findings within the past 24 hours.  Assessment/Plan:     * Chest pain  Initial tropoinin was <4.0, will trend, EKG Normal sinus rhythm, Patient was seen by Dr. Kilgore today and plans to do a LHC tomorrow, will start BB, High intensity statin, and asa. Echo pending.   NORA score 1  Heart Score 2  Cardiology consulted NPO after midnight.       Nicotine dependence  Dangers of cigarette smoking were reviewed with patient in detail. Patient was Counseled for 3-10 minutes. Nicotine replacement options were discussed. Nicotine replacement was discussed- not prescribed as it is medically contra-indicated      VTE Risk Mitigation (From admission, onward)           Ordered     enoxaparin injection 40 mg  Daily         11/05/24 1820     IP VTE HIGH RISK PATIENT  Once         11/05/24 1820     Place sequential compression device  Until discontinued         11/05/24 1820                           On 11/05/2024, patient should be placed in hospital observation services under my care in collaboration with Dr. Lopez.         Onel  DO Chris  Department of Hospital Medicine  [facility] - Emergency Department

## 2024-11-06 NOTE — ASSESSMENT & PLAN NOTE
Initial tropoinin was <4.0, will trend, EKG Normal sinus rhythm, Patient was seen by Dr. Kilgore today and plans to do a LHC tomorrow, will start BB, High intensity statin, and asa. Echo pending.   NORA score 1  Heart Score 2  Cardiology consulted NPO after midnight.       11/6  - episode of CP this am, planning for cath today  - pending cardio recs

## 2024-11-06 NOTE — HPI
Patient is a 40 yo M who presents to [facility] with complaints of chest pain and heart paliptations. He was seen by Dr. Kilgore office earlier today that told the patient to come to the ED to get admitted for  [facility] tomorrow.     Initial vital signs in the ED HR 75, /95, Temp 98.1, Spo2 99%. Initial lab values were WBC 9.95, H/H 15.8/48.4, Platelets 292, PT 12.5 INR 0.94, D-dimer 0.38, Na 137, K 4.3, Cl 108, Co2 25, BUN/Cr 21/1.33, Glu 82, pro BNP ,5, Troponin <4.0, CXR unremarkable. Patient was loaded with asa in the ed, and started on BB.     This patient will be admitted for observation at [facility].

## 2024-11-06 NOTE — CONSULTS
[facility]  Cardiology  Consult Note    Patient Name: Syed Casanova  MRN: [id number]  Admission Date: 11/5/2024  Hospital Length of Stay: 0 days  Code Status: Full Code   Attending Provider: Constance Negron MD   Consulting Provider: MARY JO Morrow  Primary Care Physician: Melida Abel MD  Principal Problem:Chest pain    Patient information was obtained from patient, past medical records, ER records, and primary team.     Inpatient consult to Cardiology  Consult performed by: Jesica Rodriguez FNP  Consult ordered by: Onel Perez DO  Reason for consult: chest pain        Subjective:     Chief Complaint:  chest pain     HPI:   40 y/o male with no significant PMH other than current smoker who presented to Dr. Kilgore clinic yesterday for new patient for chest pain had abnormal EKG and symptoms concerning for ACS. He was sent to ED for further evaluation and likely LHC.    Patient reports intermittent chest pain and palpitations over the past couple of weeks. Troponin negative x 2. Echo with EF 40%.    History reviewed. No pertinent past medical history.    Past Surgical History:   Procedure Laterality Date    APPENDECTOMY         Review of patient's allergies indicates:  No Known Allergies    No current facility-administered medications on file prior to encounter.     Current Outpatient Medications on File Prior to Encounter   Medication Sig    magnesium oxide 400 mg magnesium Tab Take 1 tablet by mouth once daily.    multivitamin (THERAGRAN) per tablet Take 1 tablet by mouth once daily.    omeprazole (PRILOSEC) 20 MG capsule Take 20 mg by mouth once daily.     Family History       Problem Relation (Age of Onset)    Heart attack Maternal Grandfather    No Known Problems Mother, Father          Tobacco Use    Smoking status: Every Day     Types: Cigarettes     Passive exposure: Never    Smokeless tobacco: Never   Substance and Sexual Activity    Alcohol use: Yes      Comment: socially    Drug use: Never    Sexual activity: Yes     Review of Systems   Constitutional: Negative for chills and fever.   HENT: Negative.     Eyes: Negative.    Cardiovascular:  Positive for chest pain and palpitations. Negative for dyspnea on exertion, leg swelling and orthopnea.   Respiratory: Negative.     Gastrointestinal:  Positive for heartburn. Negative for nausea and vomiting.   Genitourinary: Negative.    Neurological: Negative.      Objective:     Vital Signs (Most Recent):  Temp: 98.2 °F (36.8 °C) (11/06/24 0704)  Pulse: 65 (11/06/24 0704)  Resp: 18 (11/06/24 0704)  BP: 117/79 (11/06/24 0704)  SpO2: 98 % (11/06/24 0841) Vital Signs (24h Range):  Temp:  [97.6 °F (36.4 °C)-98.2 °F (36.8 °C)] 98.2 °F (36.8 °C)  Pulse:  [64-95] 65  Resp:  [10-19] 18  SpO2:  [94 %-99 %] 98 %  BP: (117-140)/() 117/79     Weight: 96.2 kg (212 lb)  Body mass index is 33.2 kg/m².    SpO2: 98 %       No intake or output data in the 24 hours ending 11/06/24 1036    Lines/Drains/Airways       Peripheral Intravenous Line  Duration                  Peripheral IV - Single Lumen 11/05/24 1650 20 G 1 1/4 in No Anterior;Left;Proximal Forearm <1 day                     Physical Exam  Vitals reviewed.   Constitutional:       General: He is not in acute distress.  HENT:      Nose: Nose normal.      Mouth/Throat:      Mouth: Mucous membranes are moist.      Pharynx: Oropharynx is clear.   Eyes:      General: No scleral icterus.     Pupils: Pupils are equal, round, and reactive to light.   Cardiovascular:      Rate and Rhythm: Normal rate and regular rhythm.      Heart sounds: Normal heart sounds.   Pulmonary:      Effort: Pulmonary effort is normal.      Breath sounds: Normal breath sounds.   Abdominal:      General: Bowel sounds are normal.      Palpations: Abdomen is soft.   Musculoskeletal:      Right lower leg: No edema.      Left lower leg: No edema.   Skin:     General: Skin is warm and dry.   Neurological:      Mental  "Status: He is alert and oriented to person, place, and time.          Significant Labs: ABG: No results for input(s): "PH", "PCO2", "HCO3", "POCSATURATED", "BE" in the last 48 hours., Blood Culture: No results for input(s): "LABBLOO" in the last 48 hours., BMP:   Recent Labs   Lab 11/05/24 1650 11/06/24  0415   GLU 82 103    140   K 4.3 4.0   * 109*   CO2 25 29   BUN 21* 20*   CREATININE 1.33* 1.24   CALCIUM 8.9 8.5   , CMP   Recent Labs   Lab 11/05/24  1650 11/06/24  0415    140   K 4.3 4.0   * 109*   CO2 25 29   GLU 82 103   BUN 21* 20*   CREATININE 1.33* 1.24   CALCIUM 8.9 8.5   PROT 7.5 6.4   ALBUMIN 3.8 3.3*   BILITOT 0.3 0.4   ALKPHOS 61 54   AST 22 14*   ALT 37 32   ANIONGAP 8 6*   , CBC   Recent Labs   Lab 11/05/24 1650 11/06/24  0415   WBC 9.95 9.30   HGB 15.8 14.5   HCT 48.4 45.1    255   , Lipid Panel   Recent Labs   Lab 11/06/24  0415   CHOL 117   HDL 35*   LDLCALC 65   TRIG 83   CHOLHDL 3.3   , and Troponin No results for input(s): "TROPONINI" in the last 48 hours.    Significant Imaging: Cardiac Cath: No results found for this or any previous visit.     , Echocardiogram: Transthoracic echo (TTE) complete (Cupid Only):   Results for orders placed or performed during the hospital encounter of 11/05/24   Echo   Result Value Ref Range    BSA 2.13 m2    LVOT stroke volume 63.8 cm3    LVIDd 4.4 3.5 - 6.0 cm    LV Systolic Volume 40.11 mL    LV Systolic Volume Index 19.4 mL/m2    LVIDs 3.2 2.1 - 4.0 cm    LV ESV A2C 32.35 mL    LV Diastolic Volume 89.75 mL    LV ESV A4C 22.07 mL    LV Diastolic Volume Index 43.36 mL/m2    Left Ventricular End Systolic Volume by Teichholz Method 40.11 mL    Left Ventricular End Diastolic Volume by Teichholz Method 89.75 mL    IVS 1.0 0.6 - 1.1 cm    LVOT diameter 2.2 cm    LVOT area 3.8 cm2    FS 27.3 (A) 28 - 44 %    Left Ventricle Relative Wall Thickness 0.41 cm    PW 0.9 0.6 - 1.1 cm    LV mass 137.8 g    LV Mass Index 66.6 g/m2    MV Peak E " Chris 0.80 m/s    TDI LATERAL 0.12 m/s    TDI SEPTAL 0.09 m/s    E/E' ratio 7.62 m/s    MV Peak A Chris 0.67 m/s    E/A ratio 1.19     E wave deceleration time 205.73 msec    LV SEPTAL E/E' RATIO 8.89 m/s    LV LATERAL E/E' RATIO 6.67 m/s    LVOT peak chris 0.8 m/s    Left Ventricular Outflow Tract Mean Velocity 0.55 cm/s    Left Ventricular Outflow Tract Mean Gradient 1.38 mmHg    RV- villanueva basal diam 2.2 cm    RV-villanueva mid d 2.0 cm    RV Basal Diameter 7.13 cm    RV-villanueva length 7.1 cm    RV mid diameter 2.03 cm    RV/LV Ratio 0.50 cm    LA Vol (MOD) 26.99 mL    LAURA (MOD) 13.0 mL/m2    RA area length vol 20.98 mL    RA Area 11.4 cm2    RA Vol 21.42 mL    AV mean gradient 2.9 mmHg    AV peak gradient 5.8 mmHg    Ao peak chris 1.2 m/s    Ao VTI 24.0 cm    LVOT peak VTI 16.8 cm    AV valve area 2.7 cm²    AV Velocity Ratio 0.67     AV index (prosthetic) 0.70     LACI by Velocity Ratio 2.5 cm²    PV PEAK VELOCITY 0.81 m/s    PV peak gradient 3 mmHg    Ao root annulus 3.68 cm    Mean e' 0.11 m/s    ZLVIDS -1.46     ZLVIDD -3.58     LA area A4C 11.03 cm2    LA area A2C 13.77 cm2    AORTIC VALVE CUSP SEPERATION 2.22 cm    EF 40 %    Narrative      Left Ventricle: The left ventricle is normal in size. Normal wall   thickness. Regional wall motion abnormalities present. There is moderately   reduced systolic function. Ejection fraction is approximately 40%.    Right Ventricle: Normal right ventricular cavity size. Systolic   function is normal.     , EKG: No results found for this or any previous visit. , and X-Ray: CXR: X-Ray Chest 1 View (CXR):   Results for orders placed or performed during the hospital encounter of 11/05/24   X-Ray Chest 1 View    Narrative    EXAMINATION:  XR CHEST 1 VIEW    CLINICAL HISTORY:  Chest pain, unspecified    TECHNIQUE:  Single frontal view of the chest was performed.    COMPARISON:  01/24/2023.    FINDINGS:  Monitoring EKG leads are present.  The trachea is unremarkable.  The cardiomediastinal  "silhouette is within normal limits.  The hilar structures are unremarkable.  There is no evidence of free air beneath the hemidiaphragms.  There are no pleural effusions.  There is no evidence of a pneumothorax.  There is no evidence of pneumomediastinum.  No airspace opacity is present.  The osseous structures are unremarkable.      Impression    No acute process.      Electronically signed by: Les Collins MD  Date:    11/05/2024  Time:    17:35     Assessment and Plan:     New onset of congestive heart failure  Patient has Systolic (HFrEF) heart failure that is Acute. On presentation their CHF was well compensated. Most recent BNP and echo results are listed below.  No results for input(s): "BNP" in the last 72 hours.  Latest ECHO  Results for orders placed during the hospital encounter of 11/05/24    Echo    Interpretation Summary    Left Ventricle: The left ventricle is normal in size. Normal wall thickness. Regional wall motion abnormalities present. There is moderately reduced systolic function. Ejection fraction is approximately 40%.    Right Ventricle: Normal right ventricular cavity size. Systolic function is normal.    Current Heart Failure Medications       Plan  - Monitor strict I&Os and daily weights.    - Place on telemetry  - Low sodium diet  - Cardiology has been consulted  - The patient's volume status is at their baseline  - [facility] today. Procedure, risk and benefits discussed in detail with patient. He verbalized understanding and wishes to proceed. Consent obtained and on chart.  - Initiate GDMT    Nicotine dependence  - Encouraged smoking cessation        VTE Risk Mitigation (From admission, onward)           Ordered     enoxaparin injection 40 mg  Daily         11/05/24 1820     IP VTE HIGH RISK PATIENT  Once         11/05/24 1820     Place sequential compression device  Until discontinued         11/05/24 1820                    Thank you for your consult. I will follow-up with patient. Please " contact us if you have any additional questions.    Jesica Rodriguez, FERNANDOP  Cardiology   [facility] - [facility]

## 2024-11-07 VITALS
RESPIRATION RATE: 18 BRPM | WEIGHT: 212.06 LBS | TEMPERATURE: 99 F | HEART RATE: 75 BPM | OXYGEN SATURATION: 93 % | DIASTOLIC BLOOD PRESSURE: 80 MMHG | SYSTOLIC BLOOD PRESSURE: 117 MMHG | BODY MASS INDEX: 33.28 KG/M2 | HEIGHT: 67 IN

## 2024-11-07 PROCEDURE — 99214 OFFICE O/P EST MOD 30 MIN: CPT | Mod: 25,FS,, | Performed by: HOSPITALIST

## 2024-11-07 PROCEDURE — 63600175 PHARM REV CODE 636 W HCPCS: Performed by: HOSPITALIST

## 2024-11-07 PROCEDURE — 99152 MOD SED SAME PHYS/QHP 5/>YRS: CPT | Mod: ,,, | Performed by: HOSPITALIST

## 2024-11-07 PROCEDURE — 25000242 PHARM REV CODE 250 ALT 637 W/ HCPCS: Performed by: NURSE PRACTITIONER

## 2024-11-07 PROCEDURE — 25000003 PHARM REV CODE 250: Performed by: HOSPITALIST

## 2024-11-07 PROCEDURE — 93458 L HRT ARTERY/VENTRICLE ANGIO: CPT | Mod: 26,,, | Performed by: HOSPITALIST

## 2024-11-07 PROCEDURE — 25000003 PHARM REV CODE 250: Performed by: NURSE PRACTITIONER

## 2024-11-07 PROCEDURE — 99153 MOD SED SAME PHYS/QHP EA: CPT | Performed by: HOSPITALIST

## 2024-11-07 PROCEDURE — 99152 MOD SED SAME PHYS/QHP 5/>YRS: CPT | Performed by: HOSPITALIST

## 2024-11-07 PROCEDURE — G0378 HOSPITAL OBSERVATION PER HR: HCPCS

## 2024-11-07 PROCEDURE — C1887 CATHETER, GUIDING: HCPCS | Performed by: HOSPITALIST

## 2024-11-07 PROCEDURE — 25500020 PHARM REV CODE 255: Performed by: HOSPITALIST

## 2024-11-07 PROCEDURE — 25000003 PHARM REV CODE 250

## 2024-11-07 PROCEDURE — C1894 INTRO/SHEATH, NON-LASER: HCPCS | Performed by: HOSPITALIST

## 2024-11-07 PROCEDURE — 27201423 OPTIME MED/SURG SUP & DEVICES STERILE SUPPLY: Performed by: HOSPITALIST

## 2024-11-07 PROCEDURE — 93458 L HRT ARTERY/VENTRICLE ANGIO: CPT | Performed by: HOSPITALIST

## 2024-11-07 PROCEDURE — 99239 HOSP IP/OBS DSCHRG MGMT >30: CPT | Mod: ,,,

## 2024-11-07 PROCEDURE — C1769 GUIDE WIRE: HCPCS | Performed by: HOSPITALIST

## 2024-11-07 RX ORDER — FENTANYL CITRATE 50 UG/ML
INJECTION, SOLUTION INTRAMUSCULAR; INTRAVENOUS
Status: DISCONTINUED | OUTPATIENT
Start: 2024-11-07 | End: 2024-11-07 | Stop reason: HOSPADM

## 2024-11-07 RX ORDER — LOSARTAN POTASSIUM 25 MG/1
25 TABLET ORAL DAILY
Qty: 90 TABLET | Refills: 3 | Status: SHIPPED | OUTPATIENT
Start: 2024-11-08 | End: 2025-11-08

## 2024-11-07 RX ORDER — SODIUM CHLORIDE 9 MG/ML
INJECTION, SOLUTION INTRAVENOUS
Status: DISCONTINUED | OUTPATIENT
Start: 2024-11-07 | End: 2024-11-07 | Stop reason: HOSPADM

## 2024-11-07 RX ORDER — IOPAMIDOL 755 MG/ML
INJECTION, SOLUTION INTRAVASCULAR
Status: DISCONTINUED | OUTPATIENT
Start: 2024-11-07 | End: 2024-11-07 | Stop reason: HOSPADM

## 2024-11-07 RX ORDER — LIDOCAINE HYDROCHLORIDE 10 MG/ML
INJECTION, SOLUTION INFILTRATION; PERINEURAL
Status: DISCONTINUED | OUTPATIENT
Start: 2024-11-07 | End: 2024-11-07 | Stop reason: HOSPADM

## 2024-11-07 RX ORDER — MIDAZOLAM HYDROCHLORIDE 1 MG/ML
INJECTION INTRAMUSCULAR; INTRAVENOUS
Status: DISCONTINUED | OUTPATIENT
Start: 2024-11-07 | End: 2024-11-07 | Stop reason: HOSPADM

## 2024-11-07 RX ORDER — VERAPAMIL HYDROCHLORIDE 2.5 MG/ML
INJECTION, SOLUTION INTRAVENOUS
Status: DISCONTINUED | OUTPATIENT
Start: 2024-11-07 | End: 2024-11-07 | Stop reason: HOSPADM

## 2024-11-07 RX ORDER — METOPROLOL TARTRATE 25 MG/1
25 TABLET, FILM COATED ORAL 2 TIMES DAILY
Qty: 180 TABLET | Refills: 3 | Status: SHIPPED | OUTPATIENT
Start: 2024-11-07 | End: 2024-11-22

## 2024-11-07 RX ORDER — SODIUM CHLORIDE 9 MG/ML
INJECTION, SOLUTION INTRAVENOUS CONTINUOUS
Status: DISPENSED | OUTPATIENT
Start: 2024-11-07 | End: 2024-11-07

## 2024-11-07 RX ORDER — HEPARIN SODIUM 1000 [USP'U]/ML
INJECTION, SOLUTION INTRAVENOUS; SUBCUTANEOUS
Status: DISCONTINUED | OUTPATIENT
Start: 2024-11-07 | End: 2024-11-07 | Stop reason: HOSPADM

## 2024-11-07 RX ADMIN — EMPAGLIFLOZIN 10 MG: 10 TABLET, FILM COATED ORAL at 02:11

## 2024-11-07 RX ADMIN — LOSARTAN POTASSIUM 25 MG: 25 TABLET, FILM COATED ORAL at 08:11

## 2024-11-07 RX ADMIN — PANTOPRAZOLE SODIUM 40 MG: 40 TABLET, DELAYED RELEASE ORAL at 08:11

## 2024-11-07 RX ADMIN — ASPIRIN 81 MG CHEWABLE TABLET 81 MG: 81 TABLET CHEWABLE at 08:11

## 2024-11-07 RX ADMIN — METOPROLOL TARTRATE 25 MG: 25 TABLET, FILM COATED ORAL at 08:11

## 2024-11-07 NOTE — DISCHARGE SUMMARY
[facility] - [address] [place] [address] Medicine  Discharge Summary      Patient Name: Syed Casanova  MRN: [id number]  MANJEET: [id number]  Patient Class: OP- Observation  Admission Date: 11/5/2024  Hospital Length of Stay: 0 days  Discharge Date and Time:  11/07/2024 3:18 PM  Attending Physician: Constance Negron MD   Discharging Provider: Constance Negron MD  Primary Care Provider: Melida Abel MD    Primary Care Team: Networked reference to record PCT     HPI:   Patient is a 40 yo M who presents to [facility] with complaints of chest pain and heart paliptations. He was seen by Dr. Kilgore office earlier today that told the patient to come to the ED to get admitted for  C tomorrow.     Initial vital signs in the ED HR 75, /95, Temp 98.1, Spo2 99%. Initial lab values were WBC 9.95, H/H 15.8/48.4, Platelets 292, PT 12.5 INR 0.94, D-dimer 0.38, Na 137, K 4.3, Cl 108, Co2 25, BUN/Cr 21/1.33, Glu 82, pro BNP ,5, Troponin <4.0, CXR unremarkable. Patient was loaded with asa in the ed, and started on BB.     This patient will be admitted for observation at [facility].     Procedure(s) (LRB):  Left heart cath (Left)      Hospital Course:   11/6  - planned for [facility] today, held NPO  - had episode of chest pain this morning, 5/10, EKG without changes, VSS  - will DC once cardio clears    11/7  - LHC clean  - discussed with cardio, continue losartan, BB, jardiance, no MRA due to soft BP, stopped asa and statin  - follow up placed     Goals of Care Treatment Preferences:  Code Status: Full Code      SDOH Screening:  The patient was screened for utility difficulties, food insecurity, transport difficulties, housing insecurity, and interpersonal safety and there were no concerns identified this admission.     Consults:   Consults (From admission, onward)          Status Ordering Provider     Inpatient consult to Cardiology  Once        Provider:  (Not yet assigned)     Completed SARATH YU            Cardiac/Vascular  * Angina, class III  Initial tropoinin was <4.0, will trend, EKG Normal sinus rhythm, Patient was seen by Dr. Kilgore today and plans to do a LHC tomorrow, will start BB, High intensity statin, and asa. Echo pending.   NORA score 1  Heart Score 2  Cardiology consulted NPO after midnight.       11/6  - episode of CP this am, planning for cath today  - pending cardio recs    11/7  - LHC clean  - discussed with cardio, continue losartan, BB, jardiance, no MRA due to soft BP, stopped asa and statin  - follow up placed    Heart failure with mid-range ejection fraction    Echo    Result Date: 11/6/2024    Left Ventricle: The left ventricle is normal in size. Normal wall   thickness. Regional wall motion abnormalities present. There is moderately   reduced systolic function. Ejection fraction is approximately 40%.    Right Ventricle: Normal right ventricular cavity size. Systolic   function is normal.        On losartan, bb, jardiance, no mra due to soft bp, stopped asa and statin      Final Active Diagnoses:    Diagnosis Date Noted POA    PRINCIPAL PROBLEM:  Angina, class III [I20.9] 11/06/2024 Yes    Unstable angina [I20.0] 11/06/2024 Yes    New onset of congestive heart failure [I50.9] 11/06/2024 Yes    Heart failure with mid-range ejection fraction [I50.22] 11/06/2024 Yes    Obstructive sleep apnea [G47.33] 11/06/2024 Yes    Palpitations [R00.2] 11/06/2024 No    Chest pain [R07.9] 11/05/2024 Yes    Nicotine dependence [F17.200] 01/24/2023 Yes      Problems Resolved During this Admission:       Discharged Condition: stable    Disposition: Home or Self Care    Follow Up:   Follow-up Information       Peter You ACNP Follow up on 11/22/2024.    Specialties: Cardiology, Emergency Medicine  Why: Appointment scheduled with Cardiology on 11/22/2024 at 10:00 a.m.  Contact information:  [address] [place] [id number] [phone]                           Patient  Instructions:      Diet Cardiac     Notify your health care provider if you experience any of the following:  temperature >100.4     Notify your health care provider if you experience any of the following:  persistent nausea and vomiting or diarrhea     Notify your health care provider if you experience any of the following:  severe persistent headache     Notify your health care provider if you experience any of the following:  increased confusion or weakness     Notify your health care provider if you experience any of the following:  persistent dizziness, light-headedness, or visual disturbances     Activity as tolerated       Significant Diagnostic Studies: N/A    Pending Diagnostic Studies:       None           Medications:  Reconciled Home Medications:      Medication List        START taking these medications      empagliflozin 10 mg tablet  Commonly known as: Jardiance  Take 1 tablet (10 mg total) by mouth once daily.  Start taking on: November 8, 2024     losartan 25 MG tablet  Commonly known as: COZAAR  Take 1 tablet (25 mg total) by mouth once daily.  Start taking on: November 8, 2024     metoprolol tartrate 25 MG tablet  Commonly known as: LOPRESSOR  Take 1 tablet (25 mg total) by mouth 2 (two) times daily.            CONTINUE taking these medications      magnesium oxide 400 mg magnesium Tab  Take 1 tablet by mouth once daily.     multivitamin per tablet  Commonly known as: THERAGRAN  Take 1 tablet by mouth once daily.     omeprazole 20 MG capsule  Commonly known as: PRILOSEC  Take 20 mg by mouth once daily.              Indwelling Lines/Drains at time of discharge:   Lines/Drains/Airways       None                   Time spent on the discharge of patient: 40 minutes         Constance Negron MD  Department of Hospital Medicine  [facility]

## 2024-11-07 NOTE — ASSESSMENT & PLAN NOTE
Initial tropoinin was <4.0, will trend, EKG Normal sinus rhythm, Patient was seen by Dr. Kilgore today and plans to do a LHC tomorrow, will start BB, High intensity statin, and asa. Echo pending.   NORA score 1  Heart Score 2  Cardiology consulted NPO after midnight.       11/6  - episode of CP this am, planning for cath today  - pending cardio recs    11/7  - LHC clean  - discussed with cardio, continue losartan, BB, jardiance, no MRA due to soft BP, stopped asa and statin  - follow up placed

## 2024-11-07 NOTE — NURSING
Discharge instructions reviewed with patient; copy given to patient. Patient voiced understanding of appts, meds, dressing changes and the following:                                                                                          CARE OF YOUR PUNCTURE SITE  *You or someone else, if you are unable, must inspect the site daily.  *DO NOT sit in a bathtub or pool of water for 5 days or until wound has healed.  *You may shower 24 hours after the procedure. Always use a clean washcloth to care for your incision and the area around it and a second washcloth for your body. Remove the bandaid before showering. Gently clean site using soap and water while standing in the shower. Dry thoroughly.  *After your shower, apply an antibacterial ointment to the site and cover with a bandaid the first day after your cath.  If you choose not to shower this day, you will still need to clean and redress your cath site.  *If you start bleeding at the site lay down while either you or someone else holds pressure over the site for 10 minutes without letting up. Seek help immediately if it does not stop bleeding.                                                                                                          ACTIVITY  *Fatigue is common for 1-2 days.  *You may resume normal activity in 2-3 days, letting pain be your guide.  *Limit lifting over 10 pounds for one week or until wound heals.   *Over the next few days, if you have to cough, laugh, or sneeze, hold pressure on the site with your hand while doing so.                                                                                          NORMAL OBSERVATIONS  *Soreness or tenderness that may last one week.  *Mild oozing from the site.  *Possible bruising that could last 2 weeks.  *Formation of a small lump (dime to quarter size) which may last up to 6 weeks.        CALL THE DOCTOR IMMEDIATELY OR GO TO THE EMERGENCY DEPARTMENT IF YOU EXPERIENCE ANY OF THE  FOLLOWING  *Significant bleeding that does not stop after 10 minutes of applying firm pressure.  *Increased swelling at access site or extremity.  *Unusual pain at access site:extremity or back pain.  *Signs of infection:redness, warmth to touch, drainage other than a little blood or pink tinged drainage on the bandaid, poorly healing puncture site, fever, or chills.

## 2024-11-07 NOTE — NURSING
1610 Line removed and patient belongings returned.Patient and wife ambulated off floor Nurse @ side.

## 2024-11-07 NOTE — DISCHARGE INSTRUCTIONS
Hospitalist Discharge orders  *Notify your healthcare provider if you experience any of the following: temperature >100.4  *Notify your healthcare provider if you experience any of the following: redness, tenderness, or any signs or symptoms of infection.  *Notify your healthcare provider if you experience any of the following: difficulty breathing or increased cough.  *Notify your physician if you experience any persistent nausea, vomiting, or diarrhea or headache.  *Notify your physician if you experience any of the following:severe uncontrolled pain, worsening rash, increased confusion or weakness, dizziness, lightheadedness or visual disturbances.

## 2024-11-07 NOTE — ASSESSMENT & PLAN NOTE
Echo    Result Date: 11/6/2024    Left Ventricle: The left ventricle is normal in size. Normal wall   thickness. Regional wall motion abnormalities present. There is moderately   reduced systolic function. Ejection fraction is approximately 40%.    Right Ventricle: Normal right ventricular cavity size. Systolic   function is normal.        On losartan, bb, jardiance, no mra due to soft bp, stopped asa and statin

## 2024-11-08 ENCOUNTER — TELEPHONE (OUTPATIENT)
Dept: CARDIOLOGY | Facility: HOSPITAL | Age: 39
End: 2024-11-08
Payer: COMMERCIAL

## 2024-11-08 NOTE — PLAN OF CARE
[facility] Telemetry  Discharge Final Note    Primary Care Provider: Melida Abel MD    Expected Discharge Date: 11/7/2024    Final Discharge Note (most recent)       Final Note - 11/06/24 0940          Final Note    Assessment Type Discharge Planning Assessment        Post-Acute Status    Coverage [employer] [place]                     Important Message from Medicare             Contact Info       Peter You ACNP   Specialty: Cardiology, Emergency Medicine    [address] [place] [id number]   Phone: [phone]       Next Steps: Follow up on 11/22/2024    Instructions: Appointment scheduled with Cardiology on 11/22/2024 at 10:00 a.m.          Pt was OBS status gone to procedure. 0 dc needs noted. Chart reviewed, 0 DC needs noted. Pt DC home.

## 2024-11-11 ENCOUNTER — HOSPITAL ENCOUNTER (EMERGENCY)
Facility: HOSPITAL | Age: 39
Discharge: HOME | End: 2024-11-11
Attending: SPECIALIST
Payer: COMMERCIAL

## 2024-11-11 ENCOUNTER — PATIENT MESSAGE (OUTPATIENT)
Dept: CARDIOLOGY | Facility: CLINIC | Age: 39
End: 2024-11-11
Payer: COMMERCIAL

## 2024-11-11 VITALS
SYSTOLIC BLOOD PRESSURE: 115 MMHG | DIASTOLIC BLOOD PRESSURE: 71 MMHG | RESPIRATION RATE: 19 BRPM | HEART RATE: 75 BPM | TEMPERATURE: 98 F | BODY MASS INDEX: 32.79 KG/M2 | OXYGEN SATURATION: 96 % | HEIGHT: 67 IN | WEIGHT: 208.88 LBS

## 2024-11-11 DIAGNOSIS — R07.9 CHEST PAIN: ICD-10-CM

## 2024-11-11 DIAGNOSIS — R79.89 PRERENAL AZOTEMIA: ICD-10-CM

## 2024-11-11 DIAGNOSIS — E86.9 VOLUME DEPLETION: Primary | ICD-10-CM

## 2024-11-11 DIAGNOSIS — R81 GLUCOSURIA WITH NORMAL SERUM GLUCOSE: ICD-10-CM

## 2024-11-11 LAB
ALBUMIN SERPL BCP-MCNC: 3.9 G/DL (ref 3.5–5)
ALBUMIN/GLOB SERPL: 1 {RATIO}
ALP SERPL-CCNC: 87 U/L (ref 45–115)
ALT SERPL W P-5'-P-CCNC: 40 U/L (ref 16–61)
ANION GAP SERPL CALCULATED.3IONS-SCNC: 14 MMOL/L (ref 7–16)
APTT PPP: 25.4 SECONDS (ref 25.2–37.3)
AST SERPL W P-5'-P-CCNC: 20 U/L (ref 15–37)
BASOPHILS # BLD AUTO: 0.06 K/UL (ref 0–0.2)
BASOPHILS NFR BLD AUTO: 0.6 % (ref 0–1)
BILIRUB SERPL-MCNC: 0.4 MG/DL (ref ?–1.2)
BILIRUB UR QL STRIP: NEGATIVE
BUN SERPL-MCNC: 27 MG/DL (ref 7–18)
BUN/CREAT SERPL: 22 (ref 6–20)
CALCIUM SERPL-MCNC: 8.9 MG/DL (ref 8.5–10.1)
CHLORIDE SERPL-SCNC: 101 MMOL/L (ref 98–107)
CLARITY UR: CLEAR
CO2 SERPL-SCNC: 26 MMOL/L (ref 21–32)
COLOR UR: YELLOW
CREAT SERPL-MCNC: 1.21 MG/DL (ref 0.7–1.3)
D DIMER PPP FEU-MCNC: 0.27 ΜG/ML (ref 0–0.47)
DIFFERENTIAL METHOD BLD: ABNORMAL
EGFR (NO RACE VARIABLE) (RUSH/TITUS): 77 ML/MIN/1.73M2
EOSINOPHIL # BLD AUTO: 0.17 K/UL (ref 0–0.5)
EOSINOPHIL NFR BLD AUTO: 1.7 % (ref 1–4)
ERYTHROCYTE [DISTWIDTH] IN BLOOD BY AUTOMATED COUNT: 12.2 % (ref 11.5–14.5)
GLOBULIN SER-MCNC: 3.9 G/DL (ref 2–4)
GLUCOSE SERPL-MCNC: 103 MG/DL (ref 70–105)
GLUCOSE SERPL-MCNC: 111 MG/DL (ref 74–106)
GLUCOSE UR STRIP-MCNC: >=1000 MG/DL
HCT VFR BLD AUTO: 47 % (ref 40–54)
HGB BLD-MCNC: 16.1 G/DL (ref 13.5–18)
IMM GRANULOCYTES # BLD AUTO: 0.03 K/UL (ref 0–0.04)
IMM GRANULOCYTES NFR BLD: 0.3 % (ref 0–0.4)
INR BLD: 0.91
KETONES UR STRIP-SCNC: ABNORMAL MG/DL
LEUKOCYTE ESTERASE UR QL STRIP: NEGATIVE
LYMPHOCYTES # BLD AUTO: 2.87 K/UL (ref 1–4.8)
LYMPHOCYTES NFR BLD AUTO: 29.1 % (ref 27–41)
MAGNESIUM SERPL-MCNC: 2.1 MG/DL (ref 1.7–2.3)
MCH RBC QN AUTO: 30.4 PG (ref 27–31)
MCHC RBC AUTO-ENTMCNC: 34.3 G/DL (ref 32–36)
MCV RBC AUTO: 88.8 FL (ref 80–96)
MONOCYTES # BLD AUTO: 0.73 K/UL (ref 0–0.8)
MONOCYTES NFR BLD AUTO: 7.4 % (ref 2–6)
MPC BLD CALC-MCNC: 10.2 FL (ref 9.4–12.4)
NEUTROPHILS # BLD AUTO: 6.01 K/UL (ref 1.8–7.7)
NEUTROPHILS NFR BLD AUTO: 60.9 % (ref 53–65)
NITRITE UR QL STRIP: NEGATIVE
NRBC # BLD AUTO: 0 X10E3/UL
NRBC, AUTO (.00): 0 %
NT-PROBNP SERPL-MCNC: 15 PG/ML (ref 1–125)
OHS QRS DURATION: 84 MS
OHS QRS DURATION: 88 MS
OHS QTC CALCULATION: 373 MS
OHS QTC CALCULATION: 403 MS
PH UR STRIP: 5.5 PH UNITS
PLATELET # BLD AUTO: 304 K/UL (ref 150–400)
POTASSIUM SERPL-SCNC: 3.9 MMOL/L (ref 3.5–5.1)
PROT SERPL-MCNC: 7.8 G/DL (ref 6.4–8.2)
PROT UR QL STRIP: NEGATIVE
PROTHROMBIN TIME: 12.2 SECONDS (ref 11.7–14.7)
RBC # BLD AUTO: 5.29 M/UL (ref 4.6–6.2)
RBC # UR STRIP: NEGATIVE /UL
SODIUM SERPL-SCNC: 137 MMOL/L (ref 136–145)
SP GR UR STRIP: 1.02
TROPONIN I SERPL DL<=0.01 NG/ML-MCNC: 5.5 PG/ML
TROPONIN I SERPL DL<=0.01 NG/ML-MCNC: <4 PG/ML
UROBILINOGEN UR STRIP-ACNC: 0.2 MG/DL
WBC # BLD AUTO: 9.87 K/UL (ref 4.5–11)

## 2024-11-11 PROCEDURE — 84484 ASSAY OF TROPONIN QUANT: CPT | Performed by: EMERGENCY MEDICINE

## 2024-11-11 PROCEDURE — 25000003 PHARM REV CODE 250: Performed by: EMERGENCY MEDICINE

## 2024-11-11 PROCEDURE — 36415 COLL VENOUS BLD VENIPUNCTURE: CPT | Performed by: EMERGENCY MEDICINE

## 2024-11-11 PROCEDURE — 81003 URINALYSIS AUTO W/O SCOPE: CPT | Performed by: SPECIALIST

## 2024-11-11 PROCEDURE — 83735 ASSAY OF MAGNESIUM: CPT | Performed by: SPECIALIST

## 2024-11-11 PROCEDURE — 83880 ASSAY OF NATRIURETIC PEPTIDE: CPT | Performed by: SPECIALIST

## 2024-11-11 PROCEDURE — 99285 EMERGENCY DEPT VISIT HI MDM: CPT | Mod: 25

## 2024-11-11 PROCEDURE — 25000003 PHARM REV CODE 250: Performed by: SPECIALIST

## 2024-11-11 PROCEDURE — 80053 COMPREHEN METABOLIC PANEL: CPT | Performed by: SPECIALIST

## 2024-11-11 PROCEDURE — 85379 FIBRIN DEGRADATION QUANT: CPT | Performed by: SPECIALIST

## 2024-11-11 PROCEDURE — 85730 THROMBOPLASTIN TIME PARTIAL: CPT | Performed by: SPECIALIST

## 2024-11-11 PROCEDURE — 82962 GLUCOSE BLOOD TEST: CPT

## 2024-11-11 PROCEDURE — 85025 COMPLETE CBC W/AUTO DIFF WBC: CPT | Performed by: SPECIALIST

## 2024-11-11 PROCEDURE — 83036 HEMOGLOBIN GLYCOSYLATED A1C: CPT | Performed by: EMERGENCY MEDICINE

## 2024-11-11 PROCEDURE — 93010 ELECTROCARDIOGRAM REPORT: CPT | Mod: ,,, | Performed by: INTERNAL MEDICINE

## 2024-11-11 PROCEDURE — 93005 ELECTROCARDIOGRAM TRACING: CPT

## 2024-11-11 PROCEDURE — 99284 EMERGENCY DEPT VISIT MOD MDM: CPT | Mod: ,,, | Performed by: EMERGENCY MEDICINE

## 2024-11-11 PROCEDURE — 84484 ASSAY OF TROPONIN QUANT: CPT | Performed by: SPECIALIST

## 2024-11-11 PROCEDURE — 85610 PROTHROMBIN TIME: CPT | Performed by: SPECIALIST

## 2024-11-11 RX ORDER — LORAZEPAM 0.5 MG/1
1 TABLET ORAL
Status: COMPLETED | OUTPATIENT
Start: 2024-11-11 | End: 2024-11-11

## 2024-11-11 RX ORDER — NAPROXEN SODIUM 220 MG/1
162 TABLET, FILM COATED ORAL
Status: COMPLETED | OUTPATIENT
Start: 2024-11-11 | End: 2024-11-11

## 2024-11-11 RX ORDER — NAPROXEN SODIUM 220 MG/1
81 TABLET, FILM COATED ORAL
Status: COMPLETED | OUTPATIENT
Start: 2024-11-11 | End: 2024-11-11

## 2024-11-11 RX ADMIN — SODIUM CHLORIDE 1000 ML: 9 INJECTION, SOLUTION INTRAVENOUS at 08:11

## 2024-11-11 RX ADMIN — ASPIRIN 81 MG CHEWABLE TABLET 162 MG: 81 TABLET CHEWABLE at 06:11

## 2024-11-11 RX ADMIN — ASPIRIN 81 MG CHEWABLE TABLET 81 MG: 81 TABLET CHEWABLE at 06:11

## 2024-11-11 RX ADMIN — LORAZEPAM 1 MG: 0.5 TABLET ORAL at 06:11

## 2024-11-11 NOTE — ED TRIAGE NOTES
Pt to Er with c/o chest pain and SOB onset 2 hours today. Pt states he had a hearth cath Thursday and no abnormalities found. Pt states his pain is in the center of his chest and feel like pressure and having some sharp stabbing pains pain is a 6 on 0-10 scale. Resp even at 22.

## 2024-11-12 ENCOUNTER — OFFICE VISIT (OUTPATIENT)
Dept: FAMILY MEDICINE | Facility: CLINIC | Age: 39
End: 2024-11-12
Payer: COMMERCIAL

## 2024-11-12 VITALS
SYSTOLIC BLOOD PRESSURE: 120 MMHG | BODY MASS INDEX: 33.25 KG/M2 | DIASTOLIC BLOOD PRESSURE: 78 MMHG | OXYGEN SATURATION: 97 % | WEIGHT: 211.81 LBS | HEIGHT: 67 IN | TEMPERATURE: 99 F | HEART RATE: 70 BPM

## 2024-11-12 DIAGNOSIS — I50.9 CONGESTIVE HEART FAILURE, UNSPECIFIED HF CHRONICITY, UNSPECIFIED HEART FAILURE TYPE: ICD-10-CM

## 2024-11-12 DIAGNOSIS — E86.9 VOLUME DEPLETION: ICD-10-CM

## 2024-11-12 DIAGNOSIS — R81 GLUCOSURIA: Primary | ICD-10-CM

## 2024-11-12 LAB
BILIRUB SERPL-MCNC: NEGATIVE MG/DL
BLOOD URINE, POC: NEGATIVE
CLARITY, UA: CLEAR
COLOR, UA: YELLOW
EST. AVERAGE GLUCOSE BLD GHB EST-MCNC: 100 MG/DL
GLUCOSE UR QL STRIP: ABNORMAL
HBA1C MFR BLD HPLC: 5.1 % (ref 4.5–6.6)
KETONES UR QL STRIP: NEGATIVE
LEUKOCYTE ESTERASE URINE, POC: NEGATIVE
NITRITE, POC UA: NEGATIVE
PH, POC UA: 5.5
PROTEIN, POC: NEGATIVE
SPECIFIC GRAVITY, POC UA: 1.02
UROBILINOGEN, POC UA: 0.2

## 2024-11-12 PROCEDURE — 3074F SYST BP LT 130 MM HG: CPT | Mod: CPTII,,, | Performed by: FAMILY MEDICINE

## 2024-11-12 PROCEDURE — 4010F ACE/ARB THERAPY RXD/TAKEN: CPT | Mod: CPTII,,, | Performed by: FAMILY MEDICINE

## 2024-11-12 PROCEDURE — 99213 OFFICE O/P EST LOW 20 MIN: CPT | Mod: ,,, | Performed by: FAMILY MEDICINE

## 2024-11-12 PROCEDURE — 3008F BODY MASS INDEX DOCD: CPT | Mod: CPTII,,, | Performed by: FAMILY MEDICINE

## 2024-11-12 PROCEDURE — 3044F HG A1C LEVEL LT 7.0%: CPT | Mod: CPTII,,, | Performed by: FAMILY MEDICINE

## 2024-11-12 PROCEDURE — 3078F DIAST BP <80 MM HG: CPT | Mod: CPTII,,, | Performed by: FAMILY MEDICINE

## 2024-11-12 RX ORDER — BUSPIRONE HYDROCHLORIDE 10 MG/1
10 TABLET ORAL 2 TIMES DAILY
Qty: 60 TABLET | Refills: 11 | Status: SHIPPED | OUTPATIENT
Start: 2024-11-12 | End: 2025-11-12

## 2024-11-12 NOTE — PROGRESS NOTES
Anselmo Cornell MD   [facility]  [address] [place] [address]     PATIENT NAME: Syed Casanova  : 1985  DATE: 24  MRN: [id number]      Billing Provider: Anselmo Corenll MD  Level of Service: ME OFFICE/OUTPT VISIT, EST, LEVL III, 20-29 MIN  Patient PCP Information       Provider PCP Type    Melida Abel MD General            Reason for Visit / Chief Complaint: Follow-up ([facility] follow up: Patient saw Dr Jame Bower 5th was having chest pain, SOB, palpations, dizziness. Patient went through ER aT [place] AND WAS ADMITTED HEART CATH PERFORMED  and discharged that day. Patient has a new diagnosis of heart failure with a EF of 40% Patient started on losartan 25mg, metoprolol 25mg, and jardiance. Follow up with NP cardiology 24. Patient went to ER last night with chest pain and sob. Diagnosed with volume depletion, prerenal azotemia, and glucosuria. ) and Anxiety (Patient states he has had an increase in anxiety, having a hard time managing. Wants to discuss medication. )         History of Present Illness / Problem Focused Workflow     Syed Casanova presents to the clinic with Follow-up ([facility] follow up: Patient saw Dr Jame Bower 5th was having chest pain, SOB, palpations, dizziness. Patient went through ER aT [place] AND WAS ADMITTED HEART CATH PERFORMED  and discharged that day. Patient has a new diagnosis of heart failure with a EF of 40% Patient started on losartan 25mg, metoprolol 25mg, and jardiance. Follow up with NP cardiology 24. Patient went to ER last night with chest pain and sob. Diagnosed with volume depletion, prerenal azotemia, and glucosuria. ) and Anxiety (Patient states he has had an increase in anxiety, having a hard time managing. Wants to discuss medication. )     HPI    Review of Systems     Review of Systems   Constitutional:  Negative for activity change, appetite change, fatigue and fever.   HENT:   Negative for nasal congestion, ear pain, hearing loss, sinus pressure/congestion and sore throat.    Respiratory:  Negative for cough, chest tightness and shortness of breath.    Cardiovascular:  Negative for chest pain and palpitations.   Gastrointestinal:  Negative for abdominal pain and fecal incontinence.   Genitourinary:  Negative for bladder incontinence, difficulty urinating and erectile dysfunction.   Musculoskeletal:  Negative for arthralgias.   Integumentary:  Negative for rash.   Neurological:  Negative for dizziness and headaches.        Medical / Social / Family History   History reviewed. No pertinent past medical history.    Past Surgical History:   Procedure Laterality Date    APPENDECTOMY      LEFT HEART CATHETERIZATION Left 11/7/2024    Procedure: Left heart cath;  Surgeon: Lenny Leon MD;  Location: [facility];  Service: Cardiology;  Laterality: Left;       Social History  Syed Casanova  reports that he has quit smoking. His smoking use included cigarettes. He has never been exposed to tobacco smoke. He has never used smokeless tobacco. He reports current alcohol use. He reports that he does not use drugs.    Family History  Syed Casanova  family history includes Heart attack in his maternal grandfather; No Known Problems in his father and mother.    Medications and Allergies     Medications  Outpatient Medications Marked as Taking for the 11/12/24 encounter (Office Visit) with Anselmo Cornell MD   Medication Sig Dispense Refill    empagliflozin (JARDIANCE) 10 mg tablet Take 10 mg by mouth once daily.      losartan (COZAAR) 25 MG tablet Take 1 tablet (25 mg total) by mouth once daily. 90 tablet 3    magnesium oxide 400 mg magnesium Tab Take 1 tablet by mouth once daily.      multivitamin (THERAGRAN) per tablet Take 1 tablet by mouth once daily.      omeprazole (PRILOSEC) 20 MG capsule Take 20 mg by mouth once daily.      [DISCONTINUED] metoprolol tartrate (LOPRESSOR) 25 MG  tablet Take 1 tablet (25 mg total) by mouth 2 (two) times daily. 180 tablet 3       Allergies  Review of patient's allergies indicates:  No Known Allergies    Physical Examination     Vitals:    11/12/24 0745   BP: 120/78   Pulse: 70   Temp: 98.7 °F (37.1 °C)     Physical Exam  Constitutional:       General: He is not in acute distress.     Appearance: He is not ill-appearing.   HENT:      Head: Normocephalic and atraumatic.      Right Ear: Tympanic membrane and ear canal normal.      Left Ear: Tympanic membrane and ear canal normal.      Nose: Nose normal. No congestion or rhinorrhea.   Eyes:      Pupils: Pupils are equal, round, and reactive to light.   Cardiovascular:      Rate and Rhythm: Normal rate and regular rhythm.      Pulses: Normal pulses.      Heart sounds: No murmur heard.  Pulmonary:      Effort: No respiratory distress.      Breath sounds: No wheezing, rhonchi or rales.   Abdominal:      General: Bowel sounds are normal.      Palpations: Abdomen is soft.      Tenderness: There is no abdominal tenderness.      Hernia: No hernia is present.   Musculoskeletal:      Cervical back: Normal range of motion and neck supple.   Lymphadenopathy:      Cervical: No cervical adenopathy.   Skin:     General: Skin is warm and dry.   Neurological:      Mental Status: He is alert.   Psychiatric:         Behavior: Behavior normal.         Thought Content: Thought content normal.          Assessment and Plan (including Health Maintenance)   :    Plan:         Health Maintenance Due   Topic Date Due    Hepatitis C Screening  Never done    HIV Screening  Never done    High Dose Statin  Never done    Influenza Vaccine (1) 09/01/2024    COVID-19 Vaccine (1 - 2024-25 season) Never done       Problem List Items Addressed This Visit    None  Visit Diagnoses       Glucosuria    -  Primary    Relevant Orders    POCT URINALYSIS W/O SCOPE (Completed)    Congestive heart failure, unspecified HF chronicity, unspecified heart failure  type        Volume depletion              Glucosuria  -     POCT URINALYSIS W/O SCOPE    Congestive heart failure, unspecified HF chronicity, unspecified heart failure type    Volume depletion    Other orders  -     busPIRone (BUSPAR) 10 MG tablet; Take 1 tablet (10 mg total) by mouth 2 (two) times daily.  Dispense: 60 tablet; Refill: 11       Health Maintenance Topics with due status: Not Due       Topic Last Completion Date    TETANUS VACCINE 01/04/2016    Lipid Panel 11/06/2024    Hemoglobin A1c (Diabetic Prevention Screening) 11/11/2024    RSV Vaccine (Age 60+ and Pregnant patients) Not Due       Procedures     Future Appointments   Date Time Provider Department Center   12/11/2024  2:00 PM [facility] GI ROOM 01 [facility]   2/27/2025  8:20 AM Roxanne Smith FNP [facility]        No follow-ups on file.       Signature:  Anselmo Cornell MD  [facility]  [address] [place] [address]  [phone] Phone  [phone] Fax    Date of encounter: 11/12/24

## 2024-11-12 NOTE — ED PROVIDER NOTES
Encounter Date: 11/11/2024       History     Chief Complaint   Patient presents with    Shortness of Breath    Chest Pain     Patient is a 40 yo wm who had a recent heart cath on Thursday at [place] per Dr Leon that was negative.  He had an ECHO which revealed that he had an EF of 40 %.  Patient started having chest pain approximately 2 hrs ago without diaphoresis.        Review of patient's allergies indicates:  No Known Allergies  History reviewed. No pertinent past medical history.  Past Surgical History:   Procedure Laterality Date    APPENDECTOMY      LEFT HEART CATHETERIZATION Left 11/7/2024    Procedure: Left heart cath;  Surgeon: Lenny Leon MD;  Location: [facility] CATH LAB;  Service: Cardiology;  Laterality: Left;     Family History   Problem Relation Name Age of Onset    No Known Problems Mother      No Known Problems Father      Heart attack Maternal Grandfather       Social History     Tobacco Use    Smoking status: Former     Types: Cigarettes     Passive exposure: Never    Smokeless tobacco: Never   Substance Use Topics    Alcohol use: Yes     Comment: socially    Drug use: Never     Review of Systems   Constitutional: Negative.    HENT: Negative.     Eyes: Negative.    Respiratory:  Positive for shortness of breath.    Cardiovascular:  Positive for chest pain.   Gastrointestinal: Negative.    Endocrine: Negative.    Genitourinary: Negative.    Musculoskeletal: Negative.    Allergic/Immunologic: Negative.    Neurological: Negative.    Hematological: Negative.    Psychiatric/Behavioral: Negative.     All other systems reviewed and are negative.      Physical Exam     Initial Vitals [11/11/24 1747]   BP Pulse Resp Temp SpO2   126/84 84 (!) 22 97.7 °F (36.5 °C) 100 %      MAP       --         Physical Exam    Nursing note and vitals reviewed.  Constitutional: He appears well-developed and well-nourished. No distress.   HENT:   Head: Normocephalic and atraumatic.   Eyes: Conjunctivae are normal.  Pupils are equal, round, and reactive to light.   Neck: Neck supple.   Normal range of motion.  Cardiovascular:  Normal rate, regular rhythm and normal heart sounds.           Pulmonary/Chest: Breath sounds normal.   Abdominal: Abdomen is soft. Bowel sounds are normal.   Musculoskeletal:         General: Normal range of motion.      Cervical back: Normal range of motion and neck supple.     Neurological: He is alert and oriented to person, place, and time. He has normal strength. GCS score is 15. GCS eye subscore is 4. GCS verbal subscore is 5. GCS motor subscore is 6.   Skin: Skin is warm.   Psychiatric: He has a normal mood and affect. His behavior is normal. Judgment and thought content normal.         Medical Screening Exam   See Full Note    ED Course   Procedures  Labs Reviewed   COMPREHENSIVE METABOLIC PANEL - Abnormal       Result Value    Sodium 137      Potassium 3.9      Chloride 101      CO2 26      Anion Gap 14      Glucose 111 (*)     BUN 27 (*)     Creatinine 1.21      BUN/Creatinine Ratio 22 (*)     Calcium 8.9      Total Protein 7.8      Albumin 3.9      Globulin 3.9      A/G Ratio 1.0      Bilirubin, Total 0.4      Alk Phos 87      ALT 40      AST 20      eGFR 77     URINALYSIS, REFLEX TO URINE CULTURE - Abnormal    Color, UA Yellow      Clarity, UA Clear      pH, UA 5.5      Leukocytes, UA Negative      Nitrites, UA Negative      Protein, UA Negative      Glucose, UA >=1000 (*)     Ketones, UA Trace      Urobilinogen, UA 0.2      Bilirubin, UA Negative      Blood, UA Negative      Specific Gravity, UA 1.025     CBC WITH DIFFERENTIAL - Abnormal    WBC 9.87      RBC 5.29      Hemoglobin 16.1      Hematocrit 47.0      MCV 88.8      MCH 30.4      MCHC 34.3      RDW 12.2      Platelet Count 304      MPV 10.2      Neutrophils % 60.9      Lymphocytes % 29.1      Monocytes % 7.4 (*)     Eosinophils % 1.7      Basophils % 0.6      Immature Granulocytes % 0.3      nRBC, Auto 0.0      Neutrophils, Abs 6.01       Lymphocytes, Absolute 2.87      Monocytes, Absolute 0.73      Eosinophils, Absolute 0.17      Basophils, Absolute 0.06      Immature Granulocytes, Absolute 0.03      nRBC, Absolute 0.00      Diff Type Auto     APTT - Normal    PTT 25.4     NT-PRO NATRIURETIC PEPTIDE - Normal    ProBNP 15     D DIMER, QUANTITATIVE - Normal    D-Dimer 0.27     MAGNESIUM - Normal    Magnesium 2.1     PROTIME-INR - Normal    PT 12.2      INR 0.91     TROPONIN I - Normal    Troponin I High Sensitivity <4.0     TROPONIN I - Normal    Troponin I High Sensitivity 5.5     CBC W/ AUTO DIFFERENTIAL    Narrative:     The following orders were created for panel order CBC auto differential.  Procedure                               Abnormality         Status                     ---------                               -----------         ------                     CBC with Differential[[id number]]       Abnormal            Final result                 Please view results for these tests on the individual orders.   HEMOGLOBIN A1C   POCT GLUCOSE MONITORING CONTINUOUS    POC Glucose 103     POCT GLUCOSE MONITORING CONTINUOUS          Imaging Results              X-Ray Chest 1 View (Final result)  Result time 11/11/24 18:28:23      Final result by Leslie Garcia MD (11/11/24 18:28:23)                   Impression:      The no acute abnormality.      Electronically signed by: Leslie Garcia  Date:    11/11/2024  Time:    18:28               Narrative:    EXAMINATION:  XR CHEST 1 VIEW    CLINICAL HISTORY:  Chest pain, unspecified    TECHNIQUE:  Single frontal view of the chest was performed.    COMPARISON:  11/05/2024 chest x-ray    FINDINGS:  Cardiac silhouette is stable and nonenlarged.  The lungs are clear.  There is no pleural effusion or pneumothorax.  Osseous structures are intact                                       Medications   sodium chloride 0.9% bolus 1,000 mL 1,000 mL (1,000 mLs Intravenous New Bag 11/11/24 2041)   aspirin  chewable tablet 162 mg (162 mg Oral Given 11/11/24 1818)   aspirin chewable tablet 81 mg (81 mg Oral Given 11/11/24 1818)   LORazepam tablet 1 mg (1 mg Oral Given 11/11/24 1832)     Medical Decision Making  Patient with a normal cath and slightly abnormal ECHO last Thursday with chest pain x 2 hours and shortness of breath    Patient had 1000 mg/dL glucosuria.  Blood sugar on labs was 111 and a repeat fingerstick blood sugar was 103.  Patient did have positive tachycardia with orthostatic vital sign testing and was given an IV fluid bolus.    Amount and/or Complexity of Data Reviewed  Labs: ordered. Decision-making details documented in ED Course.  Radiology: ordered. Decision-making details documented in ED Course.  ECG/medicine tests:  Decision-making details documented in ED Course.    Risk  OTC drugs.  Prescription drug management.               ED Course as of 11/11/24 2123 Mon Nov 11, 2024 1858 Magnesium  Magnesium level is normal [LM]   1858 Protime-INR  Protime and INR are normal [LM]   1858 APTT  PTT is no [LM]   1858 Comprehensive metabolic panel(!)  CMP shows glucose 111, BUN is 27 with creatinine 1.21, BUN creatinine ratio of 22 with an estimated GFR 77. [LM]   1858 Troponin I  Troponin is normal [LM]   1858 NT-Pro Natriuretic Peptide  BNP is normal [LM]   1858 D dimer, quantitative  D-dimer is normal [LM]   1858 CBC auto differential(!)  CBC is normal [LM]   1858 X-Ray Chest 1 View  Review of radiologist's report for chest x-ray shows no acute abnormality. [LM]   2023 Urinalysis, Reflex to Urine Culture(!)  Urinalysis shows yellow clear urine with greater than 1000 mg/dL glucosuria. [LM]   2035 POCT glucose  Repeat glucose was 103 mg/dL [LM]   2108 Troponin I  Repeat troponin is normal [LM]      ED Course User Index  [LM] Elvis Roberts DO                             Clinical Impression:   Final diagnoses:  [E86.9] Volume depletion (Primary)  [R79.89] Prerenal azotemia  [R81] Glucosuria with  normal serum glucose        ED Disposition Condition    Discharge Stable          ED Prescriptions    None       Follow-up Information       Follow up With Specialties Details Why Contact Info    Melida Abel MD Family Medicine Schedule an appointment as soon as possible for a visit in 2 days To review results of hemoglobin A1c and recheck urinalysis for glucosuria. [address] [place] [address]  [phone]               Elvis Roberts DO  11/11/24 1018

## 2024-11-13 LAB
OHS QRS DURATION: 84 MS
OHS QTC CALCULATION: 401 MS

## 2024-11-14 ENCOUNTER — TELEPHONE (OUTPATIENT)
Dept: CARDIOLOGY | Facility: HOSPITAL | Age: 39
End: 2024-11-14
Payer: COMMERCIAL

## 2024-11-22 ENCOUNTER — OFFICE VISIT (OUTPATIENT)
Dept: CARDIOLOGY | Facility: CLINIC | Age: 39
End: 2024-11-22
Payer: COMMERCIAL

## 2024-11-22 ENCOUNTER — HOSPITAL ENCOUNTER (OUTPATIENT)
Dept: RADIOLOGY | Facility: HOSPITAL | Age: 39
Discharge: HOME | End: 2024-11-22
Payer: COMMERCIAL

## 2024-11-22 VITALS
SYSTOLIC BLOOD PRESSURE: 118 MMHG | OXYGEN SATURATION: 97 % | BODY MASS INDEX: 33.12 KG/M2 | WEIGHT: 211 LBS | DIASTOLIC BLOOD PRESSURE: 80 MMHG | HEART RATE: 63 BPM | HEIGHT: 67 IN

## 2024-11-22 DIAGNOSIS — R93.5 ABNORMAL ULTRASOUND OF ABDOMEN: ICD-10-CM

## 2024-11-22 DIAGNOSIS — R11.0 NAUSEA: ICD-10-CM

## 2024-11-22 DIAGNOSIS — R11.2 NAUSEA AND VOMITING, UNSPECIFIED VOMITING TYPE: ICD-10-CM

## 2024-11-22 DIAGNOSIS — G47.33 OBSTRUCTIVE SLEEP APNEA: ICD-10-CM

## 2024-11-22 DIAGNOSIS — G47.33 OSA (OBSTRUCTIVE SLEEP APNEA): ICD-10-CM

## 2024-11-22 DIAGNOSIS — R07.9 CHEST PAIN, UNSPECIFIED TYPE: ICD-10-CM

## 2024-11-22 DIAGNOSIS — I50.22 HEART FAILURE WITH MID-RANGE EJECTION FRACTION: Primary | ICD-10-CM

## 2024-11-22 PROCEDURE — 99213 OFFICE O/P EST LOW 20 MIN: CPT | Mod: S$PBB,,, | Performed by: REGISTERED NURSE

## 2024-11-22 PROCEDURE — 74177 CT ABD & PELVIS W/CONTRAST: CPT | Mod: 26,,, | Performed by: RADIOLOGY

## 2024-11-22 PROCEDURE — 74177 CT ABD & PELVIS W/CONTRAST: CPT | Mod: TC

## 2024-11-22 PROCEDURE — 1159F MED LIST DOCD IN RCRD: CPT | Mod: ,,, | Performed by: REGISTERED NURSE

## 2024-11-22 PROCEDURE — 3044F HG A1C LEVEL LT 7.0%: CPT | Mod: ,,, | Performed by: REGISTERED NURSE

## 2024-11-22 PROCEDURE — 25500020 PHARM REV CODE 255

## 2024-11-22 PROCEDURE — 3079F DIAST BP 80-89 MM HG: CPT | Mod: ,,, | Performed by: REGISTERED NURSE

## 2024-11-22 PROCEDURE — 4010F ACE/ARB THERAPY RXD/TAKEN: CPT | Mod: ,,, | Performed by: REGISTERED NURSE

## 2024-11-22 PROCEDURE — 99999 PR PBB SHADOW E&M-EST. PATIENT-LVL IV: CPT | Mod: PBBFAC,,, | Performed by: REGISTERED NURSE

## 2024-11-22 PROCEDURE — 3008F BODY MASS INDEX DOCD: CPT | Mod: ,,, | Performed by: REGISTERED NURSE

## 2024-11-22 PROCEDURE — 3074F SYST BP LT 130 MM HG: CPT | Mod: ,,, | Performed by: REGISTERED NURSE

## 2024-11-22 PROCEDURE — 99214 OFFICE O/P EST MOD 30 MIN: CPT | Mod: PBBFAC,25 | Performed by: REGISTERED NURSE

## 2024-11-22 RX ORDER — IOPAMIDOL 755 MG/ML
100 INJECTION, SOLUTION INTRAVASCULAR
Status: COMPLETED | OUTPATIENT
Start: 2024-11-22 | End: 2024-11-22

## 2024-11-22 RX ORDER — METOPROLOL SUCCINATE 25 MG/1
25 TABLET, EXTENDED RELEASE ORAL DAILY
Qty: 90 TABLET | Refills: 3 | Status: SHIPPED | OUTPATIENT
Start: 2024-11-22 | End: 2025-11-22

## 2024-11-22 RX ADMIN — IOPAMIDOL 100 ML: 755 INJECTION, SOLUTION INTRAVENOUS at 08:11

## 2024-11-22 NOTE — PROGRESS NOTES
PCP: Melida Abel MD    Referring Provider:     Subjective:   Syed Casanova is a 39 y.o. male without significant past hx who presents for f/u.    11/22/24: presents for f/u after recent [facility] HFmrEF 40%. No reported chest pain, palpitations, worsening sob or edema        Fhx:  Family History   Problem Relation Name Age of Onset    No Known Problems Mother      No Known Problems Father      Heart attack Maternal Grandfather       Shx:   Social History     Socioeconomic History    Marital status:    Tobacco Use    Smoking status: Former     Types: Cigarettes     Passive exposure: Never    Smokeless tobacco: Never   Substance and Sexual Activity    Alcohol use: Yes     Comment: socially    Drug use: Never    Sexual activity: Yes     Social Drivers of Health     Financial Resource Strain: Low Risk  (11/6/2024)    Overall Financial Resource Strain (CARDIA)     Difficulty of Paying Living Expenses: Not hard at all   Food Insecurity: No Food Insecurity (11/6/2024)    Hunger Vital Sign     Worried About Running Out of Food in the Last Year: Never true     Ran Out of Food in the Last Year: Never true   Transportation Needs: No Transportation Needs (11/6/2024)    TRANSPORTATION NEEDS     Transportation : No   Physical Activity: Inactive (11/6/2024)    Exercise Vital Sign     Days of Exercise per Week: 0 days     Minutes of Exercise per Session: 0 min   Stress: Stress Concern Present (11/6/2024)    [employer] - Occupational Stress Questionnaire     Feeling of Stress : To some extent   Housing Stability: Low Risk  (11/6/2024)    Housing Stability Vital Sign     Unable to Pay for Housing in the Last Year: No     Homeless in the Last Year: No       EKG 11/22/24 NSR  ECHO Results for orders placed during the hospital encounter of 11/05/24    Echo    Interpretation Summary    Left Ventricle: The left ventricle is normal in size. Normal wall thickness. Regional wall motion  abnormalities present. There is moderately reduced systolic function. Ejection fraction is approximately 40%.    Right Ventricle: Normal right ventricular cavity size. Systolic function is normal.    [facility] Results for orders placed during the hospital encounter of 11/05/24    Cardiac catheterization    Conclusion    The pre-procedure left ventricular end diastolic pressure was 8.    The estimated blood loss was <50 mL.    There was non-obstructive coronary artery disease. Minor luminal irregularities only.    Right dominant circulation.    The procedure log was documented by Documenter: Tashia Gaines RN and verified by Lenny Leon MD.    Date: 11/7/2024  Time: 10:43 AM        Lab Results   Component Value Date     11/11/2024    K 3.9 11/11/2024     11/11/2024    CO2 26 11/11/2024    BUN 27 (H) 11/11/2024    CREATININE 1.21 11/11/2024    CALCIUM 8.9 11/11/2024    ANIONGAP 14 11/11/2024    ESTGFRAFRICA 68 09/15/2020    EGFRNONAA 56 09/15/2020       Lab Results   Component Value Date    CHOL 117 11/06/2024    CHOL 140 07/22/2024     Lab Results   Component Value Date    HDL 35 (L) 11/06/2024    HDL 38 (L) 07/22/2024     Lab Results   Component Value Date    LDLCALC 65 11/06/2024    LDLCALC 71 07/22/2024     Lab Results   Component Value Date    TRIG 83 11/06/2024    TRIG 154 (H) 07/22/2024     Lab Results   Component Value Date    CHOLHDL 3.3 11/06/2024    CHOLHDL 3.7 07/22/2024       Lab Results   Component Value Date    WBC 9.87 11/11/2024    HGB 16.1 11/11/2024    HCT 47.0 11/11/2024    MCV 88.8 11/11/2024     11/11/2024           Current Outpatient Medications:     busPIRone (BUSPAR) 10 MG tablet, Take 1 tablet (10 mg total) by mouth 2 (two) times daily., Disp: 60 tablet, Rfl: 11    empagliflozin (JARDIANCE) 10 mg tablet, Take 10 mg by mouth once daily., Disp: , Rfl:     losartan (COZAAR) 25 MG tablet, Take 1 tablet (25 mg total) by mouth once daily., Disp: 90 tablet, Rfl: 3    magnesium  "oxide 400 mg magnesium Tab, Take 1 tablet by mouth once daily., Disp: , Rfl:     multivitamin (THERAGRAN) per tablet, Take 1 tablet by mouth once daily., Disp: , Rfl:     omeprazole (PRILOSEC) 20 MG capsule, Take 20 mg by mouth once daily., Disp: , Rfl:     metoprolol succinate (TOPROL-XL) 25 MG 24 hr tablet, Take 1 tablet (25 mg total) by mouth once daily., Disp: 90 tablet, Rfl: 3  No current facility-administered medications for this visit.    Review of Systems   Respiratory:  Negative for shortness of breath.    Cardiovascular:  Negative for chest pain, palpitations and leg swelling.   Gastrointestinal:  Negative for heartburn.   All other systems reviewed and are negative.          Objective:   /80 (BP Location: Left arm, Patient Position: Sitting)   Pulse 63   Ht 5' 7" (1.702 m)   Wt 95.7 kg (211 lb)   SpO2 97%   BMI 33.05 kg/m²     Physical Exam  Vitals reviewed.   Constitutional:       Appearance: He is obese. He is not ill-appearing.   HENT:      Head: Normocephalic.   Eyes:      Pupils: Pupils are equal, round, and reactive to light.   Cardiovascular:      Rate and Rhythm: Normal rate and regular rhythm.      Pulses: Normal pulses.      Heart sounds: Normal heart sounds.   Pulmonary:      Effort: Pulmonary effort is normal.   Musculoskeletal:         General: Normal range of motion.   Skin:     General: Skin is warm and dry.      Capillary Refill: Capillary refill takes less than 2 seconds.   Neurological:      General: No focal deficit present.      Mental Status: He is alert and oriented to person, place, and time.           Assessment:     1. Heart failure with mid-range ejection fraction        2. SAJAN (obstructive sleep apnea)  Ambulatory referral/consult to Sleep Disorders    TSH      3. Chest pain, unspecified type  EKG 12-lead    EKG 12-lead    Basic Metabolic Panel            Plan:   Heart failure with mid-range ejection fraction  EF 40% -->NICM  Euvolemic, asymptomatic  GDMT: Losartan, " adriana, toprol  Sleep study pending

## 2024-11-25 ENCOUNTER — TELEPHONE (OUTPATIENT)
Dept: GASTROENTEROLOGY | Facility: CLINIC | Age: 39
End: 2024-11-25
Payer: COMMERCIAL

## 2024-11-25 ENCOUNTER — PATIENT MESSAGE (OUTPATIENT)
Dept: GASTROENTEROLOGY | Facility: CLINIC | Age: 39
End: 2024-11-25
Payer: COMMERCIAL

## 2024-11-25 DIAGNOSIS — N28.89 MASS OF KIDNEY: Primary | ICD-10-CM

## 2024-11-25 NOTE — TELEPHONE ENCOUNTER
----- Message from MARY JO Trent sent at 11/25/2024  3:08 PM CST -----  He has a questionable mass on the left kidney that is concerning for malignancy.  I am going to refer him to urology and it is important that he go.  He also has fatty liver.  LFTs are normal.  Recommendations below.    - Take all of your medicines as instructed.  - Avoid alcohol. Alcohol can make liver problems worse.  - Eat a healthy diet with plenty of vegetables, fruits, and whole grains.  - Get regular physical activity. Even gentle activity, like walking, is good for your health.  - Treat your high blood sugar if you have diabetes  - Treat your high cholesterol if you have it

## 2024-11-27 ENCOUNTER — PATIENT MESSAGE (OUTPATIENT)
Dept: GASTROENTEROLOGY | Facility: CLINIC | Age: 39
End: 2024-11-27
Payer: COMMERCIAL

## 2024-11-27 ENCOUNTER — TELEPHONE (OUTPATIENT)
Dept: UROLOGY | Facility: CLINIC | Age: 39
End: 2024-11-27
Payer: COMMERCIAL

## 2024-11-27 NOTE — TELEPHONE ENCOUNTER
Anat Feliciano Staff  Good afternoon,    I wanted to reach out about this referral. The pt is being referred urgently for a dx of Mass of kidney [N28.89]. Pt is very concerned about uncertainty of dx and wants to schedule sooner than February if possible. Can you please review his chart and schedule him for an earlier date if possible?  Call back number for pt is [phone]    Patient was called and scheduled for appointment on Monday 12/2/24 at 7:45, he verbalized understanding.

## 2024-12-01 ENCOUNTER — PATIENT MESSAGE (OUTPATIENT)
Dept: CARDIOLOGY | Facility: CLINIC | Age: 39
End: 2024-12-01
Payer: COMMERCIAL

## 2024-12-02 ENCOUNTER — OFFICE VISIT (OUTPATIENT)
Dept: UROLOGY | Facility: CLINIC | Age: 39
End: 2024-12-02
Payer: COMMERCIAL

## 2024-12-02 VITALS
WEIGHT: 213 LBS | BODY MASS INDEX: 33.43 KG/M2 | HEIGHT: 67 IN | SYSTOLIC BLOOD PRESSURE: 119 MMHG | HEART RATE: 70 BPM | DIASTOLIC BLOOD PRESSURE: 80 MMHG

## 2024-12-02 DIAGNOSIS — N28.89 OTHER SPECIFIED DISORDERS OF KIDNEY AND URETER: ICD-10-CM

## 2024-12-02 DIAGNOSIS — N28.89 LEFT RENAL MASS: Primary | ICD-10-CM

## 2024-12-02 DIAGNOSIS — N28.89 MASS OF KIDNEY WITH NORMAL RENAL FUNCTION: ICD-10-CM

## 2024-12-02 DIAGNOSIS — N28.89 MASS OF KIDNEY: ICD-10-CM

## 2024-12-02 PROCEDURE — 99214 OFFICE O/P EST MOD 30 MIN: CPT | Mod: PBBFAC | Performed by: UROLOGY

## 2024-12-02 PROCEDURE — 99999 PR PBB SHADOW E&M-EST. PATIENT-LVL IV: CPT | Mod: PBBFAC,,, | Performed by: UROLOGY

## 2024-12-02 PROCEDURE — 3074F SYST BP LT 130 MM HG: CPT | Mod: ,,, | Performed by: UROLOGY

## 2024-12-02 PROCEDURE — 3008F BODY MASS INDEX DOCD: CPT | Mod: ,,, | Performed by: UROLOGY

## 2024-12-02 PROCEDURE — 3044F HG A1C LEVEL LT 7.0%: CPT | Mod: ,,, | Performed by: UROLOGY

## 2024-12-02 PROCEDURE — 1159F MED LIST DOCD IN RCRD: CPT | Mod: ,,, | Performed by: UROLOGY

## 2024-12-02 PROCEDURE — 1160F RVW MEDS BY RX/DR IN RCRD: CPT | Mod: ,,, | Performed by: UROLOGY

## 2024-12-02 PROCEDURE — 99204 OFFICE O/P NEW MOD 45 MIN: CPT | Mod: S$PBB,,, | Performed by: UROLOGY

## 2024-12-02 PROCEDURE — 3079F DIAST BP 80-89 MM HG: CPT | Mod: ,,, | Performed by: UROLOGY

## 2024-12-02 PROCEDURE — 4010F ACE/ARB THERAPY RXD/TAKEN: CPT | Mod: ,,, | Performed by: UROLOGY

## 2024-12-02 NOTE — PROGRESS NOTES
Assessment:   1. Left renal mass  -     Cancel: MRI Abdomen W WO Contrast; Future; Expected date: 12/02/2024  -     CT Abdomen With Without Contrast; Future; Expected date: 12/02/2024    2. Mass of kidney with normal renal function  -     Cancel: MRI Abdomen W WO Contrast; Future; Expected date: 12/02/2024  -     CT Abdomen With Without Contrast; Future; Expected date: 12/02/2024    3. Mass of kidney  -     Ambulatory referral/consult to Urology  -     CT Abdomen With Without Contrast; Future; Expected date: 12/02/2024    4. Other specified disorders of kidney and ureter  -     CT Abdomen With Without Contrast; Future; Expected date: 12/02/2024         Plan:     We reviewed the CT scan of the abdomen and pelvis with contrast and discussed the mass seen in the left kidney and that it was suspicious for renal cell carcinoma.  We discussed the treatment of renal cell carcinoma and I recommended a renal mass protocol CT scan to better evaluate anatomy to determine feasibility of nephron sparing surgery.     Plan:  CT abdomen with without contrast renal mass protocol              Willy Worthington MD  Urology  12/02/2024          UROLOGY HISTORY AND PHYSICAL EXAM    Subjective:      Patient ID: Syed Casanova is a 39 y.o. male.    Chief Complaint::   HPI    The patient is a 39-year-old male that presents today as a new consult for a left renal mass.  The patient reports that he presented to the emergency room with symptoms of dyspepsia and underwent a left heart catheterization and was found to have a left renal mass.  The patient reports no history of flank pain or gross hematuria.        History reviewed. No pertinent past medical history.  Past Surgical History:   Procedure Laterality Date    APPENDECTOMY      LEFT HEART CATHETERIZATION Left 11/7/2024    Procedure: Left heart cath;  Surgeon: Lenny Leon MD;  Location: [facility] CATH LAB;  Service: Cardiology;  Laterality: Left;        Current Outpatient  Medications on File Prior to Visit   Medication Sig Dispense Refill    busPIRone (BUSPAR) 10 MG tablet Take 1 tablet (10 mg total) by mouth 2 (two) times daily. 60 tablet 11    empagliflozin (JARDIANCE) 10 mg tablet Take 10 mg by mouth once daily.      losartan (COZAAR) 25 MG tablet Take 1 tablet (25 mg total) by mouth once daily. 90 tablet 3    magnesium oxide 400 mg magnesium Tab Take 1 tablet by mouth once daily.      metoprolol succinate (TOPROL-XL) 25 MG 24 hr tablet Take 1 tablet (25 mg total) by mouth once daily. 90 tablet 3    multivitamin (THERAGRAN) per tablet Take 1 tablet by mouth once daily.      omeprazole (PRILOSEC) 20 MG capsule Take 20 mg by mouth once daily.       No current facility-administered medications on file prior to visit.        Review of patient's allergies indicates:  No Known Allergies  Vitals:    12/02/24 0828   BP: 119/80   Pulse: 70          Review of Systems   Constitutional:  Positive for activity change. Negative for appetite change and fever.   HENT:  Negative for mouth sores.    Eyes:  Negative for visual disturbance.   Respiratory:  Negative for chest tightness, shortness of breath and wheezing.    Cardiovascular:  Negative for chest pain, palpitations and leg swelling.   Gastrointestinal:  Negative for abdominal pain.   Genitourinary:  Negative for flank pain.   Musculoskeletal:  Negative for back pain.   Neurological:  Negative for weakness and numbness.   Psychiatric/Behavioral:  The patient is nervous/anxious.       Objective:     Physical Exam  Vitals and nursing note reviewed.   Constitutional:       Appearance: He is not ill-appearing or diaphoretic.   Eyes:      Conjunctiva/sclera: Conjunctivae normal.   Cardiovascular:      Rate and Rhythm: Normal rate.   Pulmonary:      Effort: Pulmonary effort is normal. No respiratory distress.      Breath sounds: No wheezing.   Abdominal:      General: There is no distension.      Palpations: Abdomen is soft. There is no  mass.      Tenderness: There is no abdominal tenderness. There is no right CVA tenderness or left CVA tenderness.   Musculoskeletal:         General: No deformity. Normal range of motion.   Skin:     General: Skin is warm.   Neurological:      General: No focal deficit present.      Mental Status: He is alert and oriented to person, place, and time. Mental status is at baseline.   Psychiatric:         Mood and Affect: Mood normal.         Behavior: Behavior normal.         Thought Content: Thought content normal.         Judgment: Judgment normal.          CMP  Sodium   Date Value Ref Range Status   11/22/2024 136 136 - 145 mmol/L Final     Potassium   Date Value Ref Range Status   11/22/2024 4.4 3.5 - 5.1 mmol/L Final     Chloride   Date Value Ref Range Status   11/22/2024 103 98 - 107 mmol/L Final     CO2   Date Value Ref Range Status   11/22/2024 28 22 - 29 mmol/L Final     Glucose   Date Value Ref Range Status   11/22/2024 75 74 - 100 mg/dL Final     BUN   Date Value Ref Range Status   11/22/2024 18 9 - 21 mg/dL Final     Creatinine   Date Value Ref Range Status   11/22/2024 1.12 0.72 - 1.25 mg/dL Final     Calcium   Date Value Ref Range Status   11/22/2024 9.1 8.4 - 10.2 mg/dL Final     Total Protein   Date Value Ref Range Status   11/11/2024 7.8 6.4 - 8.2 g/dL Final     Albumin   Date Value Ref Range Status   11/11/2024 3.9 3.5 - 5.0 g/dL Final     Bilirubin, Total   Date Value Ref Range Status   11/11/2024 0.4 >0.0 - 1.2 mg/dL Final     Alk Phos   Date Value Ref Range Status   11/11/2024 87 45 - 115 U/L Final     AST   Date Value Ref Range Status   11/11/2024 20 15 - 37 U/L Final     ALT   Date Value Ref Range Status   11/11/2024 40 16 - 61 U/L Final     Anion Gap   Date Value Ref Range Status   11/22/2024 9 7 - 16 mmol/L Final     eGFR   Date Value Ref Range Status   11/22/2024 85 >=60 mL/min/1.73m2 Final      BMP  Lab Results   Component Value Date     11/22/2024    K 4.4 11/22/2024      11/22/2024    CO2 28 11/22/2024    BUN 18 11/22/2024    CREATININE 1.12 11/22/2024    CALCIUM 9.1 11/22/2024    ANIONGAP 9 11/22/2024    EGFRNORACEVR 85 11/22/2024

## 2024-12-06 ENCOUNTER — HOSPITAL ENCOUNTER (OUTPATIENT)
Dept: RADIOLOGY | Facility: HOSPITAL | Age: 39
Discharge: HOME | End: 2024-12-06
Attending: UROLOGY
Payer: COMMERCIAL

## 2024-12-06 ENCOUNTER — OFFICE VISIT (OUTPATIENT)
Dept: UROLOGY | Facility: CLINIC | Age: 39
End: 2024-12-06
Payer: COMMERCIAL

## 2024-12-06 VITALS
WEIGHT: 214 LBS | HEART RATE: 70 BPM | DIASTOLIC BLOOD PRESSURE: 76 MMHG | SYSTOLIC BLOOD PRESSURE: 114 MMHG | BODY MASS INDEX: 33.52 KG/M2 | OXYGEN SATURATION: 97 %

## 2024-12-06 DIAGNOSIS — N28.89 OTHER SPECIFIED DISORDERS OF KIDNEY AND URETER: ICD-10-CM

## 2024-12-06 DIAGNOSIS — N28.89 MASS OF KIDNEY WITH NORMAL RENAL FUNCTION: ICD-10-CM

## 2024-12-06 DIAGNOSIS — N28.89 LEFT RENAL MASS: ICD-10-CM

## 2024-12-06 DIAGNOSIS — N28.89 LEFT RENAL MASS: Primary | ICD-10-CM

## 2024-12-06 DIAGNOSIS — N28.89 MASS OF KIDNEY: ICD-10-CM

## 2024-12-06 PROCEDURE — 3074F SYST BP LT 130 MM HG: CPT | Mod: ,,, | Performed by: UROLOGY

## 2024-12-06 PROCEDURE — 99214 OFFICE O/P EST MOD 30 MIN: CPT | Mod: PBBFAC,25 | Performed by: UROLOGY

## 2024-12-06 PROCEDURE — 1160F RVW MEDS BY RX/DR IN RCRD: CPT | Mod: ,,, | Performed by: UROLOGY

## 2024-12-06 PROCEDURE — 1159F MED LIST DOCD IN RCRD: CPT | Mod: ,,, | Performed by: UROLOGY

## 2024-12-06 PROCEDURE — 3008F BODY MASS INDEX DOCD: CPT | Mod: ,,, | Performed by: UROLOGY

## 2024-12-06 PROCEDURE — 74170 CT ABD WO CNTRST FLWD CNTRST: CPT | Mod: TC

## 2024-12-06 PROCEDURE — 4010F ACE/ARB THERAPY RXD/TAKEN: CPT | Mod: ,,, | Performed by: UROLOGY

## 2024-12-06 PROCEDURE — 3078F DIAST BP <80 MM HG: CPT | Mod: ,,, | Performed by: UROLOGY

## 2024-12-06 PROCEDURE — 99214 OFFICE O/P EST MOD 30 MIN: CPT | Mod: S$PBB,,, | Performed by: UROLOGY

## 2024-12-06 PROCEDURE — 74170 CT ABD WO CNTRST FLWD CNTRST: CPT | Mod: 26,,, | Performed by: RADIOLOGY

## 2024-12-06 PROCEDURE — 99999 PR PBB SHADOW E&M-EST. PATIENT-LVL IV: CPT | Mod: PBBFAC,,, | Performed by: UROLOGY

## 2024-12-06 PROCEDURE — 25500020 PHARM REV CODE 255: Performed by: UROLOGY

## 2024-12-06 PROCEDURE — 3044F HG A1C LEVEL LT 7.0%: CPT | Mod: ,,, | Performed by: UROLOGY

## 2024-12-06 RX ORDER — IOPAMIDOL 755 MG/ML
100 INJECTION, SOLUTION INTRAVASCULAR
Status: COMPLETED | OUTPATIENT
Start: 2024-12-06 | End: 2024-12-06

## 2024-12-06 RX ADMIN — IOPAMIDOL 100 ML: 755 INJECTION, SOLUTION INTRAVENOUS at 08:12

## 2024-12-06 NOTE — PROGRESS NOTES
Assessment:   1. Left renal mass  -     Ambulatory referral/consult to Urology; Future; Expected date: 12/13/2024         Plan:     We personally reviewed the CT scan demonstrating a left enhancing solid lower pole mass involving the left kidney and discussed recommendations for consideration by expert robotic surgeon for nephron sparing surgery/left robotic partial nephrectomy.  We discussed the benefits of nephron sparing and I recommended referral to a tertiary medical center for evaluation and possible treatment.           Willy Worthington MD  Urology  12/06/2024          UROLOGY HISTORY AND PHYSICAL EXAM    Subjective:      Patient ID: Syed Casanova is a 39 y.o. male.    Chief Complaint::   Follow-up    The patient is a 39-year-old male that presents today for follow-up after undergoing a CT scan of the abdomen renal mass protocol.  He denies any flank pain or gross hematuria.  The patient is interested in nephron sparing surgery.     History reviewed. No pertinent past medical history.  Past Surgical History:   Procedure Laterality Date    APPENDECTOMY      LEFT HEART CATHETERIZATION Left 11/7/2024    Procedure: Left heart cath;  Surgeon: Lenny Leon MD;  Location: [facility] CATH LAB;  Service: Cardiology;  Laterality: Left;        Current Outpatient Medications on File Prior to Visit   Medication Sig Dispense Refill    busPIRone (BUSPAR) 10 MG tablet Take 1 tablet (10 mg total) by mouth 2 (two) times daily. 60 tablet 11    empagliflozin (JARDIANCE) 10 mg tablet Take 1 tablet (10 mg total) by mouth once daily. 90 tablet 1    losartan (COZAAR) 25 MG tablet Take 1 tablet (25 mg total) by mouth once daily. 90 tablet 3    magnesium oxide 400 mg magnesium Tab Take 1 tablet by mouth once daily.      metoprolol succinate (TOPROL-XL) 25 MG 24 hr tablet Take 1 tablet (25 mg total) by mouth once daily. 90 tablet 3    multivitamin (THERAGRAN) per tablet Take 1 tablet by mouth once daily.      omeprazole  (PRILOSEC) 20 MG capsule Take 20 mg by mouth once daily.       Current Facility-Administered Medications on File Prior to Visit   Medication Dose Route Frequency Provider Last Rate Last Admin    [COMPLETED] iopamidoL (ISOVUE-370) injection 100 mL  100 mL Intravenous ONCE PRN Willy Worthington MD   100 mL at 12/06/24 0859        Review of patient's allergies indicates:  No Known Allergies  Vitals:    12/06/24 1050   BP: 114/76   Pulse: 70          Review of Systems   Constitutional:  Negative for activity change.   Respiratory: Negative.     Cardiovascular: Negative.    Gastrointestinal: Negative.    Genitourinary: Negative.    Musculoskeletal: Negative.    Hematological: Negative.         Objective:     Physical Exam  Vitals and nursing note reviewed.   Constitutional:       Appearance: Normal appearance. He is normal weight.   HENT:      Head: Normocephalic and atraumatic.   Eyes:      General: No scleral icterus.     Conjunctiva/sclera: Conjunctivae normal.   Cardiovascular:      Rate and Rhythm: Normal rate.   Pulmonary:      Effort: No respiratory distress.      Breath sounds: No wheezing.   Abdominal:      General: There is no distension.      Palpations: Abdomen is soft. There is no mass.      Tenderness: There is no abdominal tenderness.   Musculoskeletal:         General: No deformity.   Skin:     General: Skin is warm and dry.      Findings: No rash.   Neurological:      General: No focal deficit present.      Mental Status: He is alert.   Psychiatric:         Mood and Affect: Mood normal.         Behavior: Behavior normal.         Thought Content: Thought content normal.         Judgment: Judgment normal.            CMP  Sodium   Date Value Ref Range Status   11/22/2024 136 136 - 145 mmol/L Final     Potassium   Date Value Ref Range Status   11/22/2024 4.4 3.5 - 5.1 mmol/L Final     Chloride   Date Value Ref Range Status   11/22/2024 103 98 - 107 mmol/L Final     CO2   Date Value Ref Range Status    11/22/2024 28 22 - 29 mmol/L Final     Glucose   Date Value Ref Range Status   11/22/2024 75 74 - 100 mg/dL Final     BUN   Date Value Ref Range Status   11/22/2024 18 9 - 21 mg/dL Final     Creatinine   Date Value Ref Range Status   11/22/2024 1.12 0.72 - 1.25 mg/dL Final     Calcium   Date Value Ref Range Status   11/22/2024 9.1 8.4 - 10.2 mg/dL Final     Total Protein   Date Value Ref Range Status   11/11/2024 7.8 6.4 - 8.2 g/dL Final     Albumin   Date Value Ref Range Status   11/11/2024 3.9 3.5 - 5.0 g/dL Final     Bilirubin, Total   Date Value Ref Range Status   11/11/2024 0.4 >0.0 - 1.2 mg/dL Final     Alk Phos   Date Value Ref Range Status   11/11/2024 87 45 - 115 U/L Final     AST   Date Value Ref Range Status   11/11/2024 20 15 - 37 U/L Final     ALT   Date Value Ref Range Status   11/11/2024 40 16 - 61 U/L Final     Anion Gap   Date Value Ref Range Status   11/22/2024 9 7 - 16 mmol/L Final     eGFR   Date Value Ref Range Status   11/22/2024 85 >=60 mL/min/1.73m2 Final      BMP  Lab Results   Component Value Date     11/22/2024    K 4.4 11/22/2024     11/22/2024    CO2 28 11/22/2024    BUN 18 11/22/2024    CREATININE 1.12 11/22/2024    CALCIUM 9.1 11/22/2024    ANIONGAP 9 11/22/2024    EGFRNORACEVR 85 11/22/2024

## 2024-12-10 ENCOUNTER — PATIENT MESSAGE (OUTPATIENT)
Dept: FAMILY MEDICINE | Facility: CLINIC | Age: 39
End: 2024-12-10
Payer: COMMERCIAL

## 2024-12-10 ENCOUNTER — TELEPHONE (OUTPATIENT)
Dept: FAMILY MEDICINE | Facility: CLINIC | Age: 39
End: 2024-12-10
Payer: COMMERCIAL

## 2024-12-10 NOTE — TELEPHONE ENCOUNTER
Spoke with patient regarding medication side effects of nausea and dizziness with buspar. Patient reports medication is helping with anxiety, but having nausea and dizziness each time that patient takes medication. Instructed patient to half medication to see if that would help with side effects. Instructed to call back if symptoms are not any better in the next few days. Patient verbalized understanding and agreed to try dosage decrease. Denies any other needs at present.

## 2024-12-11 ENCOUNTER — HOSPITAL ENCOUNTER (OUTPATIENT)
Dept: GASTROENTEROLOGY | Facility: HOSPITAL | Age: 39
Discharge: HOME | End: 2024-12-11
Admitting: INTERNAL MEDICINE
Payer: COMMERCIAL

## 2024-12-11 ENCOUNTER — ANESTHESIA (OUTPATIENT)
Dept: GASTROENTEROLOGY | Facility: HOSPITAL | Age: 39
End: 2024-12-11
Payer: COMMERCIAL

## 2024-12-11 ENCOUNTER — ANESTHESIA EVENT (OUTPATIENT)
Dept: GASTROENTEROLOGY | Facility: HOSPITAL | Age: 39
End: 2024-12-11
Payer: COMMERCIAL

## 2024-12-11 VITALS
TEMPERATURE: 98 F | HEART RATE: 85 BPM | WEIGHT: 214 LBS | SYSTOLIC BLOOD PRESSURE: 119 MMHG | HEIGHT: 67 IN | BODY MASS INDEX: 33.59 KG/M2 | DIASTOLIC BLOOD PRESSURE: 88 MMHG | RESPIRATION RATE: 16 BRPM | OXYGEN SATURATION: 97 %

## 2024-12-11 DIAGNOSIS — K21.9 GASTROESOPHAGEAL REFLUX DISEASE, UNSPECIFIED WHETHER ESOPHAGITIS PRESENT: ICD-10-CM

## 2024-12-11 PROCEDURE — 27201423 OPTIME MED/SURG SUP & DEVICES STERILE SUPPLY

## 2024-12-11 PROCEDURE — 88305 TISSUE EXAM BY PATHOLOGIST: CPT | Mod: TC,SUR | Performed by: INTERNAL MEDICINE

## 2024-12-11 PROCEDURE — 63600175 PHARM REV CODE 636 W HCPCS: Performed by: NURSE ANESTHETIST, CERTIFIED REGISTERED

## 2024-12-11 PROCEDURE — 37000008 HC ANESTHESIA 1ST 15 MINUTES

## 2024-12-11 PROCEDURE — 25000003 PHARM REV CODE 250: Performed by: NURSE ANESTHETIST, CERTIFIED REGISTERED

## 2024-12-11 RX ORDER — ETOMIDATE 2 MG/ML
INJECTION INTRAVENOUS
Status: DISCONTINUED | OUTPATIENT
Start: 2024-12-11 | End: 2024-12-11

## 2024-12-11 RX ORDER — PROPOFOL 10 MG/ML
VIAL (ML) INTRAVENOUS
Status: DISCONTINUED | OUTPATIENT
Start: 2024-12-11 | End: 2024-12-11

## 2024-12-11 RX ORDER — LIDOCAINE HYDROCHLORIDE 20 MG/ML
INJECTION, SOLUTION EPIDURAL; INFILTRATION; INTRACAUDAL; PERINEURAL
Status: DISCONTINUED | OUTPATIENT
Start: 2024-12-11 | End: 2024-12-11

## 2024-12-11 RX ORDER — SODIUM CHLORIDE 0.9 % (FLUSH) 0.9 %
10 SYRINGE (ML) INJECTION
Status: DISCONTINUED | OUTPATIENT
Start: 2024-12-11 | End: 2024-12-12 | Stop reason: HOSPADM

## 2024-12-11 RX ORDER — PANTOPRAZOLE SODIUM 40 MG/1
40 TABLET, DELAYED RELEASE ORAL DAILY
Qty: 90 TABLET | Refills: 3 | Status: SHIPPED | OUTPATIENT
Start: 2024-12-11 | End: 2025-12-11

## 2024-12-11 RX ADMIN — PROPOFOL 100 MG: 10 INJECTION, EMULSION INTRAVENOUS at 09:12

## 2024-12-11 RX ADMIN — PROPOFOL 40 MG: 10 INJECTION, EMULSION INTRAVENOUS at 09:12

## 2024-12-11 RX ADMIN — LIDOCAINE HYDROCHLORIDE 100 MG: 20 INJECTION, SOLUTION INTRAVENOUS at 09:12

## 2024-12-11 RX ADMIN — ETOMIDATE 5 MG: 2 INJECTION INTRAVENOUS at 09:12

## 2024-12-11 NOTE — ANESTHESIA POSTPROCEDURE EVALUATION
Anesthesia Post Evaluation    Patient: Syed Casanova    Procedure(s) Performed: * No procedures listed *    Final Anesthesia Type: general      Patient location during evaluation: GI PACU  Patient participation: Yes- Able to Participate  Level of consciousness: awake and alert  Post-procedure vital signs: reviewed and stable  Pain management: adequate  Airway patency: patent    PONV status at discharge: No PONV  Anesthetic complications: no      Cardiovascular status: blood pressure returned to baseline and hemodynamically stable  Respiratory status: spontaneous ventilation  Hydration status: euvolemic  Follow-up not needed.              Vitals Value Taken Time   /77 12/11/24 0953   Temp 36.5 °C (97.7 °F) 12/11/24 0930   Pulse 76 12/11/24 0955   Resp 22 12/11/24 0955   SpO2 95 % 12/11/24 0955   Vitals shown include unfiled device data.      No case tracking events are documented in the log.      Pain/Tang Score: Tang Score: 10 (12/11/2024  9:46 AM)

## 2024-12-11 NOTE — ANESTHESIA PREPROCEDURE EVALUATION
12/11/2024  Syed Casanova is a 39 y.o., male.      Pre-op Assessment    I have reviewed the Patient Summary Reports.     I have reviewed the Nursing Notes. I have reviewed the NPO Status.   I have reviewed the Medications.     Review of Systems  Anesthesia Hx:  No problems with previous Anesthesia                Social:  Former Smoker, No Alcohol Use       Cardiovascular:           Angina CHF                Congestive Heart Failure (CHF)   , recently started Rx       EF 40%            Hypertension         Pulmonary:        Sleep Apnea                Hepatic/GI:     GERD                Endocrine:        Obesity / BMI > 30  Psych:  Psychiatric History anxiety depression                Physical Exam  General: Well nourished, Alert, Oriented and Cooperative    Airway:  Mallampati: II   Mouth Opening: Normal  Neck ROM: Normal ROM    Dental:  Intact    Chest/Lungs:  Normal Respiratory Rate    Heart:  Rate: Normal  Rhythm: Regular Rhythm        Anesthesia Plan  Type of Anesthesia, risks & benefits discussed:    Anesthesia Type: Gen Natural Airway, MAC  Intra-op Monitoring Plan: Standard ASA Monitors  Post Op Pain Control Plan: multimodal analgesia  Induction:  IV  Informed Consent: Informed consent signed with the Patient and all parties understand the risks and agree with anesthesia plan.  All questions answered.   ASA Score: 2  Day of Surgery Review of History & Physical: H&P Update referred to the surgeon/provider.    Ready For Surgery From Anesthesia Perspective.     .

## 2024-12-11 NOTE — H&P
Gastroenterology Pre-procedure H&P    History of Present Illness    Syed Casanova is a 39 y.o. male that  has no past medical history on file.     Patient with GERD refractory to PPI and h/o melena here for EGD      No past medical history on file.    Past Surgical History:   Procedure Laterality Date    APPENDECTOMY      LEFT HEART CATHETERIZATION Left 11/7/2024    Procedure: Left heart cath;  Surgeon: Lenny Leon MD;  Location: [facility] CATH LAB;  Service: Cardiology;  Laterality: Left;       Family History   Problem Relation Name Age of Onset    No Known Problems Mother      No Known Problems Father      Heart attack Maternal Grandfather         Social History     Socioeconomic History    Marital status:    Tobacco Use    Smoking status: Former     Types: Cigarettes     Passive exposure: Never    Smokeless tobacco: Never   Substance and Sexual Activity    Alcohol use: Yes     Comment: socially    Drug use: Never    Sexual activity: Yes     Social Drivers of Health     Financial Resource Strain: Low Risk  (11/6/2024)    Overall Financial Resource Strain (CARDIA)     Difficulty of Paying Living Expenses: Not hard at all   Food Insecurity: No Food Insecurity (11/6/2024)    Hunger Vital Sign     Worried About Running Out of Food in the Last Year: Never true     Ran Out of Food in the Last Year: Never true   Transportation Needs: No Transportation Needs (11/6/2024)    TRANSPORTATION NEEDS     Transportation : No   Physical Activity: Inactive (11/6/2024)    Exercise Vital Sign     Days of Exercise per Week: 0 days     Minutes of Exercise per Session: 0 min   Stress: Stress Concern Present (11/6/2024)    [employer] - Occupational Stress Questionnaire     Feeling of Stress : To some extent   Housing Stability: Low Risk  (11/6/2024)    Housing Stability Vital Sign     Unable to Pay for Housing in the Last Year: No     Homeless in the Last Year: No       Current Outpatient  Medications   Medication Sig Dispense Refill    busPIRone (BUSPAR) 10 MG tablet Take 1 tablet (10 mg total) by mouth 2 (two) times daily. 60 tablet 11    empagliflozin (JARDIANCE) 10 mg tablet Take 1 tablet (10 mg total) by mouth once daily. 90 tablet 1    losartan (COZAAR) 25 MG tablet Take 1 tablet (25 mg total) by mouth once daily. 90 tablet 3    magnesium oxide 400 mg magnesium Tab Take 1 tablet by mouth once daily.      metoprolol succinate (TOPROL-XL) 25 MG 24 hr tablet Take 1 tablet (25 mg total) by mouth once daily. 90 tablet 3    multivitamin (THERAGRAN) per tablet Take 1 tablet by mouth once daily.      omeprazole (PRILOSEC) 20 MG capsule Take 20 mg by mouth once daily.       No current facility-administered medications for this encounter.       Review of patient's allergies indicates:  No Known Allergies    Objective:  There were no vitals filed for this visit.     GEN: normal appearing, NAD, AAO x3  HENT: NCAT, anicteric, OP benign  CV: normal rate, regular rhythm  RESP: CTA, symmetric rise, unlabored  ABD: soft, ND, no guarding or TTP  SKIN: warm and dry  NEURO: grossly afocal    Assessment and Plan:    Proceed with:    EGD for GERD, prior melena       Paulo Saunders MD  Gastroenterology

## 2024-12-11 NOTE — TRANSFER OF CARE
"Anesthesia Transfer of Care Note    Patient: Syed Casanova    Procedure(s) Performed: * No procedures listed *    Patient location: GI    Anesthesia Type: general    Transport from OR: Transported from OR on room air with adequate spontaneous ventilation    Post pain: adequate analgesia    Post assessment: no apparent anesthetic complications    Post vital signs: stable    Level of consciousness: responds to stimulation and awake    Nausea/Vomiting: no nausea/vomiting    Complications: none    Transfer of care protocol was followed      Last vitals: Visit Vitals  /88 (BP Location: Left arm, Patient Position: Lying)   Pulse 85   Temp 36.5 °C (97.7 °F) (Oral)   Resp 16   Ht 5' 7" (1.702 m)   Wt 97.1 kg (214 lb)   SpO2 97%   BMI 33.52 kg/m²     "

## 2024-12-11 NOTE — DISCHARGE INSTRUCTIONS
Procedure Date  12/11/24     Impression  Overall Impression:   [place]-colored mucosa in the GE junction and Z-line; performed cold forceps biopsy to rule out Zaidi's esophagus  Mild erythematous mucosa in the antrum, consistent with gastritis; performed cold forceps biopsies to rule out H. pylori  The upper third of the esophagus, middle third of the esophagus, lower third of the esophagus, cardia, fundus of the stomach, body of the stomach, incisura, duodenal bulb, 1st part of the duodenum and 2nd part of the duodenum appeared normal.        Recommendation  Await pathology results  Start a trial of Protonix - sent to pharmacy  Use Pepcid 20 mg up to 3x/day as needed for breakthrough symptoms    Follow up in GI clinic as scheduled or sooner prn       Outcome of procedure: successful EGD  Disposition: patient to recovery following procedure; discharge to home when appropriate parameters met  Provisions for follow up: please call my office for any unexpected symptoms like chest or abdominal pain or bleeding following your procedure.  Final Diagnosis: GERD     Indication  38 yo WM with GERD refractory to OTC PPI

## 2024-12-12 LAB
ESTROGEN SERPL-MCNC: NORMAL PG/ML
INSULIN SERPL-ACNC: NORMAL U[IU]/ML
LAB AP GROSS DESCRIPTION: NORMAL
LAB AP LABORATORY NOTES: NORMAL
T3RU NFR SERPL: NORMAL %

## 2024-12-30 ENCOUNTER — TELEPHONE (OUTPATIENT)
Dept: CARDIOLOGY | Facility: CLINIC | Age: 39
End: 2024-12-30
Payer: COMMERCIAL

## 2024-12-30 NOTE — TELEPHONE ENCOUNTER
Spoke with the patient on today per Dr. Kilgore asked the patient if he would like to come in to be seen on tomorrow.     Patient denied and explained that he won't be able to due to such short notice.    Explained to the patient that I will relay this over to Dr. Kilgore and discuss what his next steps should be.    Patient verbalized understanding.

## 2025-01-10 DIAGNOSIS — G47.30 SLEEP APNEA, UNSPECIFIED: Primary | ICD-10-CM

## 2025-01-13 PROBLEM — N28.89 LEFT RENAL MASS: Status: ACTIVE | Noted: 2025-01-13

## 2025-01-13 NOTE — PROGRESS NOTES
Clinic Note  1/13/2025      Subjective:         Chief Complaint:   HPI  Syed Casanova is a 39 y.o. male noted to have LLP renal mass.  CT abd W WO 12/6/2024- 4.5 cm endophytic, enhancing LLP solid mass. No nodes, thrombus. Single artery single vein. Mass extends into hilar fat. No macroscopic fat or central scar.  RENAL score-10x (22% MCR)  Co-morbidities- CHF, MI, obesity, anxiety disorder, SAJAN. Has not had sleep study yet. . Stopped smoking 2 months.Steven is EMS.  November CXR- no metastasis.    Past Medical History:   Diagnosis Date    Cancer     kidney    Hypertension      Family History   Problem Relation Name Age of Onset    No Known Problems Mother      No Known Problems Father      Heart attack Maternal Grandfather       Social History     Socioeconomic History    Marital status:    Tobacco Use    Smoking status: Former     Types: Cigarettes     Passive exposure: Never    Smokeless tobacco: Never   Substance and Sexual Activity    Alcohol use: Yes     Comment: socially    Drug use: Never    Sexual activity: Yes     Social Drivers of Health     Financial Resource Strain: Low Risk  (11/6/2024)    Overall Financial Resource Strain (CARDIA)     Difficulty of Paying Living Expenses: Not hard at all   Food Insecurity: No Food Insecurity (11/6/2024)    Hunger Vital Sign     Worried About Running Out of Food in the Last Year: Never true     Ran Out of Food in the Last Year: Never true   Transportation Needs: No Transportation Needs (11/6/2024)    TRANSPORTATION NEEDS     Transportation : No   Physical Activity: Inactive (11/6/2024)    Exercise Vital Sign     Days of Exercise per Week: 0 days     Minutes of Exercise per Session: 0 min   Stress: Stress Concern Present (11/6/2024)    [employer] [place] [employer] - Occupational Stress Questionnaire     Feeling of Stress : To some extent   Housing Stability: Low Risk  (11/6/2024)    Housing Stability Vital Sign     Unable to Pay  "for Housing in the Last Year: No     Homeless in the Last Year: No     Past Surgical History:   Procedure Laterality Date    APPENDECTOMY      LEFT HEART CATHETERIZATION Left 11/7/2024    Procedure: Left heart cath;  Surgeon: Lenny Leon MD;  Location: [facility];  Service: Cardiology;  Laterality: Left;     Patient Active Problem List   Diagnosis    Cough    Sinusitis    Bronchitis    COVID-19    Generalized body aches    Adjustment disorder with mixed emotional features    Allergic rhinitis    Anxiety    Arthralgia of shoulder    Axis IV diagnosis    Deferred diagnosis on axis I    Deferred diagnosis on axis II    Deferred diagnosis on axis III    Depression    Health examination of defined subpopulation    Low back pain    Neck pain    Nicotine dependence    Traumatic arthropathy    Need for vaccination    Problem related to unspecified psychosocial circumstances    Adjustment disorder with mixed anxiety and depressed mood    Testosterone deficiency    Arthritis    Gastroesophageal reflux disease    Chest pain    Unstable angina    New onset of congestive heart failure    Angina, class III    Heart failure with mid-range ejection fraction    Obstructive sleep apnea    Palpitations     Review of Systems      Objective:      There were no vitals taken for this visit.  Estimated body mass index is 33.52 kg/m² as calculated from the following:    Height as of 12/11/24: 5' 7" (1.702 m).    Weight as of 12/11/24: 97.1 kg (214 lb).  Physical Exam      Assessment and Plan:    We discussed renal masses and reviewed the imaging in detail. About 80% of enhancing renal masses < 4 cm represent a renal cell carcinoma, and masses >4 cm are more likely to be malignant (up to 90%). We discussed the different management options including renal mass biopsy along with indications and rationale for its role, renal mass ablation which is ideal for masses < 3 cm, partial nephrectomy when amenable and radical nephrectomy. " We also discussed the concept of active surveillance of renal masses, including its risks and benefits. This is typically recommended for older and/or more co-morbid patients who have a higher surgical risk. Fortunately, there is long-term data to suggest that the risk of metastasis with a mass less than 4 cm while on active surveillance approaches 0%.  We discussed the robotic partial nephrectomy procedure, recuperation, and recovery. We discussed the possibility of conversion to either a total nephrectomy or converting from a robotic approach to an open approach, depending on intra-operative variables. Although partial nephrectomies do maximize long-term renal function, they are associated with a slightly increased risk of lisa-procedural complications (bleeding and urinary fistulas). However, these short-term complications are generally outweighed by the benefits of renal preservation.   I answered questions and addressed concerns.   CT chest and MRI for staging. Visit after.   code applied: patient requires or will require a continuous, longitudinal, and active collaborative plan of care related to this patient's health condition, the management of which requires the direction of a practitioner with specialized clinical knowledge, skill, and expertise.          Problem List Items Addressed This Visit    None      Follow up:       Peter Loco

## 2025-01-14 ENCOUNTER — OFFICE VISIT (OUTPATIENT)
Dept: UROLOGY | Facility: CLINIC | Age: 40
End: 2025-01-14
Payer: COMMERCIAL

## 2025-01-14 VITALS
DIASTOLIC BLOOD PRESSURE: 78 MMHG | BODY MASS INDEX: 33.8 KG/M2 | HEART RATE: 74 BPM | HEIGHT: 67 IN | WEIGHT: 215.38 LBS | SYSTOLIC BLOOD PRESSURE: 117 MMHG

## 2025-01-14 DIAGNOSIS — N28.89 LEFT RENAL MASS: Primary | ICD-10-CM

## 2025-01-14 DIAGNOSIS — G47.33 OBSTRUCTIVE SLEEP APNEA: ICD-10-CM

## 2025-01-14 DIAGNOSIS — F43.23 ADJUSTMENT DISORDER WITH MIXED ANXIETY AND DEPRESSED MOOD: ICD-10-CM

## 2025-01-14 DIAGNOSIS — I20.9 ANGINA, CLASS III: ICD-10-CM

## 2025-01-14 DIAGNOSIS — I20.0 UNSTABLE ANGINA: ICD-10-CM

## 2025-01-14 DIAGNOSIS — N28.89 OTHER SPECIFIED DISORDERS OF KIDNEY AND URETER: ICD-10-CM

## 2025-01-14 DIAGNOSIS — I50.22 HEART FAILURE WITH MID-RANGE EJECTION FRACTION: ICD-10-CM

## 2025-01-14 PROCEDURE — 3074F SYST BP LT 130 MM HG: CPT | Mod: CPTII,S$GLB,, | Performed by: UROLOGY

## 2025-01-14 PROCEDURE — 1160F RVW MEDS BY RX/DR IN RCRD: CPT | Mod: CPTII,S$GLB,, | Performed by: UROLOGY

## 2025-01-14 PROCEDURE — 99999 PR PBB SHADOW E&M-EST. PATIENT-LVL IV: CPT | Mod: PBBFAC,,, | Performed by: UROLOGY

## 2025-01-14 PROCEDURE — 3078F DIAST BP <80 MM HG: CPT | Mod: CPTII,S$GLB,, | Performed by: UROLOGY

## 2025-01-14 PROCEDURE — 99205 OFFICE O/P NEW HI 60 MIN: CPT | Mod: S$GLB,,, | Performed by: UROLOGY

## 2025-01-14 PROCEDURE — 3008F BODY MASS INDEX DOCD: CPT | Mod: CPTII,S$GLB,, | Performed by: UROLOGY

## 2025-01-14 PROCEDURE — 1159F MED LIST DOCD IN RCRD: CPT | Mod: CPTII,S$GLB,, | Performed by: UROLOGY

## 2025-01-14 RX ORDER — TADALAFIL 5 MG/1
5 TABLET ORAL
COMMUNITY
Start: 2024-09-24

## 2025-01-14 NOTE — LETTER
January 14, 2025        Willy Worthington MD  [address] [facility] [place] [address]             [facility] - Urology 2nd Fl  [address]  [place] [address]  Phone: [phone]   Patient: Syed Casanova   MR Number: [id number]   YOB: 1985   Date of Visit: 1/14/2025       Dear Dr. Worthington:    Thank you for referring Syed Casanova to me for evaluation. Attached you will find relevant portions of my assessment and plan of care.    If you have questions, please do not hesitate to call me. I look forward to following Syed Casanova along with you.    Sincerely,      Peter Loco MD            CC  No Recipients    Enclosure      A (Catheter Sphr 2 Pro 1mm 15mm Ptca Perc Strl Lf Bln) balloon was inflated in the right coronary artery and was removed in tact.     The balloon was inflated at 18 evelia for 10 seconds at 9/3/2020 5:20 PM.  The balloon was used for 2nd inflation at 18 evelia for 10 seconds.

## 2025-01-21 ENCOUNTER — PATIENT MESSAGE (OUTPATIENT)
Dept: UROLOGY | Facility: CLINIC | Age: 40
End: 2025-01-21
Payer: COMMERCIAL

## 2025-01-21 DIAGNOSIS — N28.89 MASS OF KIDNEY: Primary | ICD-10-CM

## 2025-01-23 ENCOUNTER — OFFICE VISIT (OUTPATIENT)
Dept: FAMILY MEDICINE | Facility: CLINIC | Age: 40
End: 2025-01-23
Payer: COMMERCIAL

## 2025-01-23 VITALS
OXYGEN SATURATION: 97 % | WEIGHT: 229.81 LBS | HEART RATE: 70 BPM | TEMPERATURE: 99 F | BODY MASS INDEX: 36.07 KG/M2 | HEIGHT: 67 IN | SYSTOLIC BLOOD PRESSURE: 120 MMHG | DIASTOLIC BLOOD PRESSURE: 82 MMHG

## 2025-01-23 DIAGNOSIS — I50.9 CONGESTIVE HEART FAILURE, UNSPECIFIED HF CHRONICITY, UNSPECIFIED HEART FAILURE TYPE: Primary | ICD-10-CM

## 2025-01-23 DIAGNOSIS — F41.1 GAD (GENERALIZED ANXIETY DISORDER): ICD-10-CM

## 2025-01-23 PROCEDURE — 3079F DIAST BP 80-89 MM HG: CPT | Mod: CPTII,,, | Performed by: FAMILY MEDICINE

## 2025-01-23 PROCEDURE — 99213 OFFICE O/P EST LOW 20 MIN: CPT | Mod: ,,, | Performed by: FAMILY MEDICINE

## 2025-01-23 PROCEDURE — 3008F BODY MASS INDEX DOCD: CPT | Mod: CPTII,,, | Performed by: FAMILY MEDICINE

## 2025-01-23 PROCEDURE — 3074F SYST BP LT 130 MM HG: CPT | Mod: CPTII,,, | Performed by: FAMILY MEDICINE

## 2025-01-23 RX ORDER — VILAZODONE HYDROCHLORIDE 10 MG/1
10 TABLET ORAL DAILY
Qty: 30 TABLET | Refills: 11 | Status: SHIPPED | OUTPATIENT
Start: 2025-01-23 | End: 2025-02-13 | Stop reason: CLARIF

## 2025-01-23 NOTE — PROGRESS NOTES
Anselmo Cornell MD   [facility]  [address] [place] [address]     PATIENT NAME: Syed Casanova  : 1985  DATE: 25  MRN: [id number]      Billing Provider: Anselmo Cornell MD  Level of Service: [place] OFFICE/OUTPT VISIT, EST, LEVL III, 20-29 MIN  Patient PCP Information       Provider PCP Type    Anselmo Cornell MD General            Reason for Visit / Chief Complaint: Anxiety (Patient states dizziness with new medication has gotten worse, and medication does not seem to be helping like it was. Patient taking buspar 10mg 2x a day and has tried taking half as well, wants to discuss changing medication. )         History of Present Illness / Problem Focused Workflow     Syed Casanova presents to the clinic with Anxiety (Patient states dizziness with new medication has gotten worse, and medication does not seem to be helping like it was. Patient taking buspar 10mg 2x a day and has tried taking half as well, wants to discuss changing medication. )     HPI    Review of Systems     Review of Systems   Constitutional:  Negative for activity change, appetite change, fatigue and fever.   HENT:  Negative for nasal congestion, ear pain, hearing loss, sinus pressure/congestion and sore throat.    Respiratory:  Negative for cough, chest tightness and shortness of breath.    Cardiovascular:  Negative for chest pain and palpitations.   Gastrointestinal:  Negative for abdominal pain and fecal incontinence.   Genitourinary:  Negative for bladder incontinence, difficulty urinating and erectile dysfunction.   Musculoskeletal:  Negative for arthralgias.   Integumentary:  Negative for rash.   Neurological:  Positive for headaches. Negative for dizziness.   Psychiatric/Behavioral:  Positive for agitation, decreased concentration and sleep disturbance. The patient is nervous/anxious.         Medical / Social / Family History     Past Medical History:   Diagnosis Date    Cancer     kidney     Hypertension        Past Surgical History:   Procedure Laterality Date    APPENDECTOMY      LEFT HEART CATHETERIZATION Left 11/7/2024    Procedure: Left heart cath;  Surgeon: Lenny Leon MD;  Location: [facility] CATH LAB;  Service: Cardiology;  Laterality: Left;       Social History  Syed Casanova  reports that he has quit smoking. His smoking use included cigarettes. He has never been exposed to tobacco smoke. He has never used smokeless tobacco. He reports current alcohol use. He reports that he does not use drugs.    Family History  Syed Casanova  family history includes Heart attack in his maternal grandfather; No Known Problems in his father and mother.    Medications and Allergies     Medications  Outpatient Medications Marked as Taking for the 1/23/25 encounter (Office Visit) with Asnelmo Cornell MD   Medication Sig Dispense Refill    busPIRone (BUSPAR) 10 MG tablet Take 1 tablet (10 mg total) by mouth 2 (two) times daily. (Patient taking differently: Take 10 mg by mouth as needed.) 60 tablet 11    empagliflozin (JARDIANCE) 10 mg tablet Take 1 tablet (10 mg total) by mouth once daily. 90 tablet 1    losartan (COZAAR) 25 MG tablet Take 1 tablet (25 mg total) by mouth once daily. 90 tablet 3    magnesium oxide 400 mg magnesium Tab Take 1 tablet by mouth once daily.      multivitamin (THERAGRAN) per tablet Take 1 tablet by mouth once daily.      pantoprazole (PROTONIX) 40 MG tablet Take 1 tablet (40 mg total) by mouth once daily. 90 tablet 3    tadalafiL (CIALIS) 5 MG tablet Take 5 mg by mouth. (Patient not taking: Reported on 1/30/2025)      [DISCONTINUED] metoprolol succinate (TOPROL-XL) 25 MG 24 hr tablet Take 1 tablet (25 mg total) by mouth once daily. 90 tablet 3       Allergies  Review of patient's allergies indicates:  No Known Allergies    Physical Examination     Vitals:    01/23/25 0809   BP: 120/82   Pulse: 70   Temp: 98.6 °F (37 °C)     Physical Exam  Constitutional:       General: He  is not in acute distress.     Appearance: He is not ill-appearing.   HENT:      Head: Normocephalic and atraumatic.      Right Ear: Tympanic membrane and ear canal normal.      Left Ear: Tympanic membrane and ear canal normal.      Nose: Nose normal. No congestion or rhinorrhea.   Eyes:      Pupils: Pupils are equal, round, and reactive to light.   Cardiovascular:      Rate and Rhythm: Normal rate and regular rhythm.      Pulses: Normal pulses.      Heart sounds: No murmur heard.  Pulmonary:      Effort: No respiratory distress.      Breath sounds: No wheezing, rhonchi or rales.   Abdominal:      General: Bowel sounds are normal.      Palpations: Abdomen is soft.      Tenderness: There is no abdominal tenderness.      Hernia: No hernia is present.   Musculoskeletal:      Cervical back: Normal range of motion and neck supple.   Lymphadenopathy:      Cervical: No cervical adenopathy.   Skin:     General: Skin is warm and dry.   Neurological:      Mental Status: He is alert.   Psychiatric:         Behavior: Behavior normal.         Thought Content: Thought content normal.          Assessment and Plan (including Health Maintenance)   :    Plan:         Health Maintenance Due   Topic Date Due    Hepatitis C Screening  Never done    HIV Screening  Never done    High Dose Statin  Never done    Influenza Vaccine (1) 09/01/2024    COVID-19 Vaccine (1 - 2024-25 season) Never done       Problem List Items Addressed This Visit    None  Visit Diagnoses       Congestive heart failure, unspecified HF chronicity, unspecified heart failure type    -  Primary    KRYSTIAN (generalized anxiety disorder)              Congestive heart failure, unspecified HF chronicity, unspecified heart failure type    KRYSTIAN (generalized anxiety disorder)    Other orders  -     vilazodone (VIIBRYD) 10 mg Tab tablet; Take 1 tablet (10 mg total) by mouth once daily.  Dispense: 30 tablet; Refill: 11       Health Maintenance Topics with due status: Not Due        Topic Last Completion Date    TETANUS VACCINE 01/04/2016    Lipid Panel 11/06/2024    Hemoglobin A1c (Diabetic Prevention Screening) 11/11/2024    RSV Vaccine (Age 60+ and Pregnant patients) Not Due       Procedures     Future Appointments   Date Time Provider Department Center   2/17/2025  8:00 AM Katie Hui MD [facility]   2/17/2025 10:00 AM Nurse Klaudia Willett RN [facility] S1 PADM [facility]   2/17/2025 11:00 AM LAB, APPOINTMENT [facility] LAB VNP [facility]   2/17/2025  1:00 PM PREOP,  UROLOGY CANCER CTR [facility] UROLOG [place] [facility]   2/27/2025  8:20 AM Roxanne Smith [facility]   3/18/2025  1:30 PM Peter Loco MD [facility] Can   7/30/2025  9:40 AM Anselmo Kilgore, DO [facility]OB        No follow-ups on file.       Signature:  Anselmo Cornell MD  [facility]  [address] [facility]   [place] [id number] [phone] Phone  [phone] Fax    Date of encounter: 1/23/25

## 2025-01-24 ENCOUNTER — HOSPITAL ENCOUNTER (OUTPATIENT)
Dept: RADIOLOGY | Facility: HOSPITAL | Age: 40
Discharge: HOME | End: 2025-01-24
Attending: UROLOGY
Payer: COMMERCIAL

## 2025-01-24 ENCOUNTER — OFFICE VISIT (OUTPATIENT)
Dept: UROLOGY | Facility: CLINIC | Age: 40
End: 2025-01-24
Payer: COMMERCIAL

## 2025-01-24 VITALS
WEIGHT: 229.75 LBS | BODY MASS INDEX: 36.06 KG/M2 | SYSTOLIC BLOOD PRESSURE: 121 MMHG | HEART RATE: 71 BPM | HEIGHT: 67 IN | DIASTOLIC BLOOD PRESSURE: 80 MMHG

## 2025-01-24 DIAGNOSIS — N28.89 LEFT RENAL MASS: ICD-10-CM

## 2025-01-24 DIAGNOSIS — I50.22 HEART FAILURE WITH MID-RANGE EJECTION FRACTION: ICD-10-CM

## 2025-01-24 DIAGNOSIS — N28.89 OTHER SPECIFIED DISORDERS OF KIDNEY AND URETER: ICD-10-CM

## 2025-01-24 DIAGNOSIS — N28.89 LEFT RENAL MASS: Primary | ICD-10-CM

## 2025-01-24 PROCEDURE — 74183 MRI ABD W/O CNTR FLWD CNTR: CPT | Mod: 26,,, | Performed by: RADIOLOGY

## 2025-01-24 PROCEDURE — 71250 CT THORAX DX C-: CPT | Mod: 26,,, | Performed by: STUDENT IN AN ORGANIZED HEALTH CARE EDUCATION/TRAINING PROGRAM

## 2025-01-24 PROCEDURE — 3079F DIAST BP 80-89 MM HG: CPT | Mod: CPTII,S$GLB,, | Performed by: UROLOGY

## 2025-01-24 PROCEDURE — 3074F SYST BP LT 130 MM HG: CPT | Mod: CPTII,S$GLB,, | Performed by: UROLOGY

## 2025-01-24 PROCEDURE — 74183 MRI ABD W/O CNTR FLWD CNTR: CPT | Mod: TC

## 2025-01-24 PROCEDURE — A9585 GADOBUTROL INJECTION: HCPCS | Performed by: UROLOGY

## 2025-01-24 PROCEDURE — 99999 PR PBB SHADOW E&M-EST. PATIENT-LVL III: CPT | Mod: PBBFAC,,, | Performed by: UROLOGY

## 2025-01-24 PROCEDURE — 3008F BODY MASS INDEX DOCD: CPT | Mod: CPTII,S$GLB,, | Performed by: UROLOGY

## 2025-01-24 PROCEDURE — 25500020 PHARM REV CODE 255: Performed by: UROLOGY

## 2025-01-24 PROCEDURE — G2211 COMPLEX E/M VISIT ADD ON: HCPCS | Mod: S$GLB,,, | Performed by: UROLOGY

## 2025-01-24 PROCEDURE — 99215 OFFICE O/P EST HI 40 MIN: CPT | Mod: S$GLB,,, | Performed by: UROLOGY

## 2025-01-24 PROCEDURE — 71250 CT THORAX DX C-: CPT | Mod: TC

## 2025-01-24 RX ORDER — GADOBUTROL 604.72 MG/ML
10 INJECTION INTRAVENOUS
Status: COMPLETED | OUTPATIENT
Start: 2025-01-24 | End: 2025-01-24

## 2025-01-24 RX ADMIN — GADOBUTROL 10 ML: 604.72 INJECTION INTRAVENOUS at 08:01

## 2025-01-24 NOTE — LETTER
January 24, 2025        [facility] Hui MD  [address] [facility] [address]             [facility] - Urology 2nd Fl  [address]  [facility] [address]  Phone: [phone]   Patient: Syed Casanova   MR Number: [id number]   YOB: 1985   Date of Visit: 1/24/2025       Dear Dr. Hui:    Thank you for referring Syed Casanova to me for evaluation. Attached you will find relevant portions of my assessment and plan of care.    If you have questions, please do not hesitate to call me. I look forward to following Syed Casanova along with you.    Sincerely,      Peter Loco MD            CC  No Recipients    Enclosure

## 2025-01-24 NOTE — PROGRESS NOTES
Clinic Note  1/24/2025      Subjective:         Chief Complaint:   HPI  Syed Casanova is a 39 y.o. male noted to have LLP renal mass.  CT abd W WO 12/6/2024- 4.5 cm endophytic, enhancing LLP solid mass. No nodes, thrombus. Single artery single vein. Mass extends into hilar fat. No macroscopic fat or central scar.  RENAL score-10x (22% MCR)  Co-morbidities- CHF, MI, obesity, anxiety disorder, SAJAN. Has not had sleep study yet. . Stopped smoking 2 months.Steven is EMS.  November CXR- no metastasis.      1/24/2025-F/U visit after staging  CT chest- no metastasis  MRI abd W WO-4.6 x 4.1 x 4.2 cm, no thrombus or nodes  Creatinine-1.3 (eGFR >60)    Past Medical History:   Diagnosis Date    Cancer     kidney    Hypertension      Family History   Problem Relation Name Age of Onset    No Known Problems Mother      No Known Problems Father      Heart attack Maternal Grandfather       Social History     Socioeconomic History    Marital status:    Tobacco Use    Smoking status: Former     Types: Cigarettes     Passive exposure: Never    Smokeless tobacco: Never   Substance and Sexual Activity    Alcohol use: Yes     Comment: socially    Drug use: Never    Sexual activity: Yes     Social Drivers of Health     Financial Resource Strain: Low Risk  (11/6/2024)    Overall Financial Resource Strain (CARDIA)     Difficulty of Paying Living Expenses: Not hard at all   Food Insecurity: No Food Insecurity (11/6/2024)    Hunger Vital Sign     Worried About Running Out of Food in the Last Year: Never true     Ran Out of Food in the Last Year: Never true   Transportation Needs: No Transportation Needs (11/6/2024)    TRANSPORTATION NEEDS     Transportation : No   Physical Activity: Inactive (11/6/2024)    Exercise Vital Sign     Days of Exercise per Week: 0 days     Minutes of Exercise per Session: 0 min   Stress: Stress Concern Present (11/6/2024)    [employer] [place] [employer] - Occupational Stress  "Questionnaire     Feeling of Stress : To some extent   Housing Stability: Low Risk  (11/6/2024)    Housing Stability Vital Sign     Unable to Pay for Housing in the Last Year: No     Homeless in the Last Year: No     Past Surgical History:   Procedure Laterality Date    APPENDECTOMY      LEFT HEART CATHETERIZATION Left 11/7/2024    Procedure: Left heart cath;  Surgeon: Lenny Leon MD;  Location: [facility] CATH LAB;  Service: Cardiology;  Laterality: Left;     Patient Active Problem List   Diagnosis    Cough    Sinusitis    Bronchitis    COVID-19    Generalized body aches    Adjustment disorder with mixed emotional features    Allergic rhinitis    Anxiety    Arthralgia of shoulder    Axis IV diagnosis    Deferred diagnosis on axis I    Deferred diagnosis on axis II    Deferred diagnosis on axis III    Depression    Health examination of defined subpopulation    Low back pain    Neck pain    Nicotine dependence    Traumatic arthropathy    Need for vaccination    Problem related to unspecified psychosocial circumstances    Adjustment disorder with mixed anxiety and depressed mood    Testosterone deficiency    Arthritis    Gastroesophageal reflux disease    Chest pain    Unstable angina    New onset of congestive heart failure    Angina, class III    Heart failure with mid-range ejection fraction    Obstructive sleep apnea    Palpitations    Left renal mass     Review of Systems      Objective:      There were no vitals taken for this visit.  Estimated body mass index is 35.99 kg/m² as calculated from the following:    Height as of 1/23/25: 5' 7" (1.702 m).    Weight as of 1/23/25: 104.2 kg (229 lb 12.8 oz).  Physical Exam      Assessment and Plan:    We discussed renal masses and reviewed the imaging in detail. About 80% of enhancing renal masses < 4 cm represent a renal cell carcinoma, and masses >4 cm are more likely to be malignant (up to 90%). We discussed the different management options including renal " mass biopsy along with indications and rationale for its role, renal mass ablation which is ideal for masses < 3 cm, partial nephrectomy when amenable and radical nephrectomy. We also discussed the concept of active surveillance of renal masses, including its risks and benefits. This is typically recommended for older and/or more co-morbid patients who have a higher surgical risk. Fortunately, there is long-term data to suggest that the risk of metastasis with a mass less than 4 cm while on active surveillance approaches 0%.  We discussed the robotic partial nephrectomy procedure, recuperation, and recovery. We discussed the possibility of conversion to either a total nephrectomy or converting from a robotic approach to an open approach, depending on intra-operative variables. Although partial nephrectomies do maximize long-term renal function, they are associated with a slightly increased risk of lisa-procedural complications (bleeding and urinary fistulas). However, these short-term complications are generally outweighed by the benefits of renal preservation.   I answered questions and addressed concerns.  Discussed opioid sparing anesthetic.   Recommend nephrectomy.  Brandi will work with them to schedule surgery.  Consult Dr. Hui preop eval.      Problem List Items Addressed This Visit    None      Follow up:       Peter Loco

## 2025-01-27 ENCOUNTER — DOCUMENTATION ONLY (OUTPATIENT)
Dept: INTERNAL MEDICINE | Facility: CLINIC | Age: 40
End: 2025-01-27
Payer: COMMERCIAL

## 2025-01-27 NOTE — HPI
Trying to see him for perioperative care evaluation   I see that he is in [place]   I would have done a virtual evaluation for him  but am no longer licensed in [place] and cannot do Virtual evaluation   I see that he is coming here on 2/17 to have H & P  in the Urology clinic   His surgery is on 2/14   I  can see him that day to reduce the number of trips from [place] to [place]- He may need Anesthesiology evaluation too that day   This how ever will only give 1 week to optimize him   Is he able to come in earlier than 2/17 th  for an in person evaluation?    Please explain this to him and see if he can come in for an office evaluation

## 2025-01-27 NOTE — PROGRESS NOTES
[facility]  Progress Note    Patient Name: Syed Casanova  MRN: [id number]  Date of Evaluation- 01/27/2025  PCP- Anselmo Cornell MD    Future cases for Syed Casanova [[id number]]       Case ID Status Date Time Lit Procedure Provider Location    [id number] [facility] 2/24/2025  7:00  XI ROBOTIC NEPHRECTOMY possible open Peter Loco MD [[id number]] [facility] OR 2ND FLR            HPI:  Trying to see him for perioperative care evaluation   I see that he is in [place]   I would have done a virtual evaluation for him  but am no longer licensed in [place] and cannot do Virtual evaluation   I see that he is coming here on 2/17 to have H & P  in the Urology clinic   His surgery is on 2/14   I  can see him that day to reduce the number of trips from [place] to [place]- He may need Anesthesiology evaluation too that day   This how ever will only give 1 week to optimize him   Is he able to come in earlier than 2/17 th  for an in person evaluation?    Please explain this to him and see if he can come in for an office evaluation

## 2025-01-29 NOTE — ANESTHESIA PAT ROS NOTE
2/17/2025  Syed Casanova is a 39 y.o., male with LEFT RENAL MASS, arrive for optimization with Dr. Hui and preop anesthesia assessment.      Pre-op Assessment    I have reviewed the Patient Summary Reports.     I have reviewed the Nursing Notes. I have reviewed the NPO Status.   I have reviewed the Medications.     Review of Systems  Anesthesia Hx:  No problems with previous Anesthesia   History of prior surgery of interest to airway management or planning:  Previous anesthesia: General 12/11/2024 EGD with general anesthesia.  Procedure performed at an [facility].      Airway issues documented on chart review include GETA     Denies Family Hx of Anesthesia complications.    Denies Personal Hx of Anesthesia complications.                    Social:  Former Smoker, Alcohol Use, Social Alcohol Use       SOCIOECONOMIC STATUS  Employment Status  Full Time  Employer  [employer]    Marital Status    Steven Casanova  Spouse  Emergency Contact  [phone]      Family History   Mother No Known Problems  Father No Known Problems  Maternal Grandfather               Heart attack              Hematology/Oncology:    Oncology Normal    -- Denies Anemia:                                  EENT/Dental:  chronic allergic rhinitis           Cardiovascular:  Exercise tolerance: good   Denies Pacemaker. Hypertension, well controlled       Denies Angina. CHF    no hyperlipidemia YAN       Cardiovascular Symptoms: Angina, Maldivian Class III        Coronary Artery Disease:   Coronary Angiogram Findings , left heart catherization was 11/4/2024  RIGHT DOMINANT CIRCULATION         11/7/2024 ANGIOGRAPHY  Summary   ·  The pre-procedure left ventricular end diastolic pressure was 8.  ·  The estimated blood loss was <50 mL.  ·  There was non-obstructive coronary artery disease. Minor luminal irregularities  only.  ·  Right dominant circulation.       11/6/2024 ECHO  Summary   ·  Left Ventricle:   The left ventricle is normal in size.   Normal wall thickness.   Regional wall motion abnormalities present.   There is moderately reduced systolic function.   Ejection fraction is approximately 40%.  · Right Ventricle:   Normal right ventricular cavity size.   Systolic function is normal.     /79 HR 65    Study Details A complete echo was performed using complete 2D, color flow Doppler and spectral Doppler.   During the study, the apical and parasternal views were captured.    Overall the study quality was adequate.   There was no prior echocardiogram study performed.    Echocardiography Findings  Left Ventricle   The left ventricle is normal in size.   Normal wall thickness.   Regional wall motion abnormalities present.  There is moderately reduced systolic function.  Ejection fraction is approximately 40%.  Right Ventricle   Normal right ventricular cavity size.   Systolic function is normal.  Left Atrium   Normal left atrial size.  Right Atrium   Normal right atrial size.  Aortic Valve   The aortic valve is structurally normal.   There is normal leaflet mobility.   Aortic valve peak velocity is 1.2 m/s.   Mean gradient is 2.9 mmHg.   There is no significant regurgitation.  Mitral Valve   The mitral valve is structurally normal.   There is normal leaflet mobility.   There is no significant regurgitation.  Tricuspid Valve   The tricuspid valve is structurally normal.   There is normal leaflet mobility.   There is physiologically normal regurgitation.  Pulmonic Valve   The pulmonic valve is structurally normal. There is no significant regurgitation.  IVC/SVC   IVC was not  visualized due to poor acoustic window.  Pericardium and Other Findings   There is no pericardial effusion.       Congestive Heart Failure (CHF) , Chronic Congestive Heart Failure  , LV Systolic HF                 Other Cardiac Conditions  Palpitations  Pulmonary:        Sleep Apnea, CPAP                Renal/:  Chronic Renal Disease   LEFT RENAL MASS  Testosterone deficiency   Neoplasm/Tumor, Renal Neoplasm, Suspected Renal Cell CA.           Hepatic/GI:     GERD, well controlled Liver Disease,    APPENDECTOMY                      Musculoskeletal:  Arthritis    Musculoskeletal General/Symptoms: joint pain, neck pain.    Joint Disease:  Arthritis, Osteoarthritis 1/24/2023  Arthralgia of shoulder   1/24/2023  Traumatic arthropathy           Neurological:  Denies TIA.  Denies CVA.    Denies Seizures.        Osteoarthritis  Denies Peripheral Neuropathy                          Endocrine:  Denies Diabetes.    Diabetes, Type 2 Diabetes  , Complications include Atherosclerotic CV Disease , controlled by diet, oral hypoglycemics. Typical AM glucose range: 100 , most recent HgA1c value was 5.1 on 11/11/2024.              Obesity / BMI > 30  Dermatological:  Skin Normal    Psych:  Psychiatric History  depression   Problem related to unspecified psychosocial circumstances  Adjustment disorder with mixed anxiety and depressed moo                 Physical Exam  General: Well nourished, Cooperative, Alert and Oriented    Airway:  Mouth Opening: Normal  Tongue: Normal  Neck ROM: Normal ROM    Dental:  Caps / Implants, Intact          Anesthesia Assessment: Preoperative EQUATION    Planned Procedure: Procedure(s) (LRB):  XI ROBOTIC NEPHRECTOMY possible open (Left)  Requested Anesthesia Type:General/Regional  Surgeon: Peter Loco MD  Service: Urology  Known or anticipated Date of Surgery:2/24/2025    Surgeon notes: reviewed    Electronic QUestionnaire Assessment completed via nurse interview with patient.        Triage considerations:     Previous anesthesia records:GETA and No problems  12/11/2024 EGD  Airway:  Mallampati: II   Mouth Opening: Normal  Neck ROM: Normal ROM    Last PCP note: within 1 month , within [facility]   Subspecialty notes: Cardiology:  General, Gastroenterology, Urology    Other important co-morbidities: HTN, Obesity, SAJAN, and Smoker( former) and anxiety      Tests already available:  Available tests,  within 3 months , within [facility] .   1/24/2025 [facility]  11/222024 EKG    Optimization:  Anesthesia Preop Clinic Assessment  Indicated.    Medical Opinion Indicated      Plan:    Testing:  Hematology Profile and T&S   Pre-anesthesia  visit       Visit focus: concerns in complex and/or prolonged anesthesia, position other than supine     Consultation:IM Perioperative Hospitalist     Patient  has previously scheduled Medical Appointment: 2/17/2025    Navigation: Tests Scheduled.              Consults scheduled.             Results will be tracked by Preop Clinic.

## 2025-01-30 ENCOUNTER — PATIENT MESSAGE (OUTPATIENT)
Dept: INTERNAL MEDICINE | Facility: CLINIC | Age: 40
End: 2025-01-30
Payer: COMMERCIAL

## 2025-01-30 ENCOUNTER — OFFICE VISIT (OUTPATIENT)
Dept: CARDIOLOGY | Facility: CLINIC | Age: 40
End: 2025-01-30
Payer: COMMERCIAL

## 2025-01-30 ENCOUNTER — PROCEDURE VISIT (OUTPATIENT)
Dept: SLEEP MEDICINE | Facility: HOSPITAL | Age: 40
End: 2025-01-30
Attending: INTERNAL MEDICINE
Payer: COMMERCIAL

## 2025-01-30 VITALS
DIASTOLIC BLOOD PRESSURE: 82 MMHG | OXYGEN SATURATION: 96 % | SYSTOLIC BLOOD PRESSURE: 108 MMHG | WEIGHT: 218 LBS | BODY MASS INDEX: 34.21 KG/M2 | HEIGHT: 67 IN | HEART RATE: 61 BPM

## 2025-01-30 DIAGNOSIS — G47.30 SLEEP APNEA, UNSPECIFIED: ICD-10-CM

## 2025-01-30 DIAGNOSIS — I50.22 HEART FAILURE WITH MID-RANGE EJECTION FRACTION: Primary | ICD-10-CM

## 2025-01-30 PROCEDURE — 1159F MED LIST DOCD IN RCRD: CPT | Mod: ,,, | Performed by: REGISTERED NURSE

## 2025-01-30 PROCEDURE — 99212 OFFICE O/P EST SF 10 MIN: CPT | Mod: S$PBB,,, | Performed by: REGISTERED NURSE

## 2025-01-30 PROCEDURE — 3074F SYST BP LT 130 MM HG: CPT | Mod: ,,, | Performed by: REGISTERED NURSE

## 2025-01-30 PROCEDURE — G0399 HOME SLEEP TEST/TYPE 3 PORTA: HCPCS | Mod: PO

## 2025-01-30 PROCEDURE — 3008F BODY MASS INDEX DOCD: CPT | Mod: ,,, | Performed by: REGISTERED NURSE

## 2025-01-30 PROCEDURE — 3079F DIAST BP 80-89 MM HG: CPT | Mod: ,,, | Performed by: REGISTERED NURSE

## 2025-01-30 PROCEDURE — 99214 OFFICE O/P EST MOD 30 MIN: CPT | Mod: PBBFAC | Performed by: REGISTERED NURSE

## 2025-01-30 PROCEDURE — 99999 PR PBB SHADOW E&M-EST. PATIENT-LVL IV: CPT | Mod: PBBFAC,,, | Performed by: REGISTERED NURSE

## 2025-01-30 RX ORDER — METOPROLOL TARTRATE 25 MG/1
25 TABLET, FILM COATED ORAL 2 TIMES DAILY
Qty: 180 TABLET | Refills: 3 | Status: SHIPPED | OUTPATIENT
Start: 2025-01-30 | End: 2025-02-03

## 2025-02-03 ENCOUNTER — ANESTHESIA EVENT (OUTPATIENT)
Dept: SURGERY | Facility: HOSPITAL | Age: 40
DRG: 660 | End: 2025-02-03
Payer: COMMERCIAL

## 2025-02-03 NOTE — ASSESSMENT & PLAN NOTE
EF 40% -->NICM  Euvolemic, asymptomatic  GDMT: Losartan, jardiance, stopped lopressor (dizziness)  Sleep study pending

## 2025-02-03 NOTE — PROGRESS NOTES
PCP: Anselmo Cornell MD    Referring Provider:     Subjective:   Syed Casanova is a 39 y.o. male without significant past hx who presents for f/u.    1/30/25: complains of dizziness with lower BP. No cardiac complaints. Originally changed to lopressor bid. Will call and stop lopressor to see if this improves.    11/22/24: presents for f/u after recent [facility] HFmrEF 40%. No reported chest pain, palpitations, worsening sob or edema        Fhx:  Family History   Problem Relation Name Age of Onset    No Known Problems Mother      No Known Problems Father      Heart attack Maternal Grandfather       Shx:   Social History     Socioeconomic History    Marital status:    Tobacco Use    Smoking status: Former     Types: Cigarettes     Passive exposure: Never    Smokeless tobacco: Never   Substance and Sexual Activity    Alcohol use: Yes     Comment: socially    Drug use: Never    Sexual activity: Yes     Social Drivers of Health     Financial Resource Strain: Low Risk  (11/6/2024)    Overall Financial Resource Strain (CARDIA)     Difficulty of Paying Living Expenses: Not hard at all   Food Insecurity: No Food Insecurity (11/6/2024)    Hunger Vital Sign     Worried About Running Out of Food in the Last Year: Never true     Ran Out of Food in the Last Year: Never true   Transportation Needs: No Transportation Needs (11/6/2024)    TRANSPORTATION NEEDS     Transportation : No   Physical Activity: Inactive (11/6/2024)    Exercise Vital Sign     Days of Exercise per Week: 0 days     Minutes of Exercise per Session: 0 min   Stress: Stress Concern Present (11/6/2024)    [employer] - Occupational Stress Questionnaire     Feeling of Stress : To some extent   Housing Stability: Low Risk  (11/6/2024)    Housing Stability Vital Sign     Unable to Pay for Housing in the Last Year: No     Homeless in the Last Year: No       EKG 11/22/24 NSR  ECHO Results for orders placed during the hospital  encounter of 11/05/24    Echo    Interpretation Summary    Left Ventricle: The left ventricle is normal in size. Normal wall thickness. Regional wall motion abnormalities present. There is moderately reduced systolic function. Ejection fraction is approximately 40%.    Right Ventricle: Normal right ventricular cavity size. Systolic function is normal.    [facility] Results for orders placed during the hospital encounter of 11/05/24    Cardiac catheterization    Conclusion    The pre-procedure left ventricular end diastolic pressure was 8.    The estimated blood loss was <50 mL.    There was non-obstructive coronary artery disease. Minor luminal irregularities only.    Right dominant circulation.    The procedure log was documented by Documenter: Tashia Gaines RN and verified by Lenny Leon MD.    Date: 11/7/2024  Time: 10:43 AM        Lab Results   Component Value Date     01/24/2025    K 4.9 01/24/2025     01/24/2025    CO2 25 01/24/2025    BUN 22 (H) 01/24/2025    CREATININE 1.3 01/24/2025    CALCIUM 9.3 01/24/2025    ANIONGAP 8 01/24/2025    ESTGFRAFRICA 68 09/15/2020    EGFRNONAA 56 09/15/2020       Lab Results   Component Value Date    CHOL 117 11/06/2024    CHOL 140 07/22/2024     Lab Results   Component Value Date    HDL 35 (L) 11/06/2024    HDL 38 (L) 07/22/2024     Lab Results   Component Value Date    LDLCALC 65 11/06/2024    LDLCALC 71 07/22/2024     Lab Results   Component Value Date    TRIG 83 11/06/2024    TRIG 154 (H) 07/22/2024     Lab Results   Component Value Date    CHOLHDL 3.3 11/06/2024    CHOLHDL 3.7 07/22/2024       Lab Results   Component Value Date    WBC 9.87 11/11/2024    HGB 16.1 11/11/2024    HCT 47.0 11/11/2024    MCV 88.8 11/11/2024     11/11/2024           Current Outpatient Medications:     busPIRone (BUSPAR) 10 MG tablet, Take 1 tablet (10 mg total) by mouth 2 (two) times daily. (Patient taking differently: Take 10 mg by mouth as needed.), Disp: 60 tablet, Rfl:  "11    empagliflozin (JARDIANCE) 10 mg tablet, Take 1 tablet (10 mg total) by mouth once daily., Disp: 90 tablet, Rfl: 1    losartan (COZAAR) 25 MG tablet, Take 1 tablet (25 mg total) by mouth once daily., Disp: 90 tablet, Rfl: 3    magnesium oxide 400 mg magnesium Tab, Take 1 tablet by mouth once daily., Disp: , Rfl:     multivitamin (THERAGRAN) per tablet, Take 1 tablet by mouth once daily., Disp: , Rfl:     pantoprazole (PROTONIX) 40 MG tablet, Take 1 tablet (40 mg total) by mouth once daily., Disp: 90 tablet, Rfl: 3    vilazodone (VIIBRYD) 10 mg Tab tablet, Take 1 tablet (10 mg total) by mouth once daily., Disp: 30 tablet, Rfl: 11    tadalafiL (CIALIS) 5 MG tablet, Take 5 mg by mouth. (Patient not taking: Reported on 1/30/2025), Disp: , Rfl:     Review of Systems   Respiratory:  Negative for shortness of breath.    Cardiovascular:  Negative for chest pain, palpitations and leg swelling.   Gastrointestinal:  Negative for heartburn.   All other systems reviewed and are negative.          Objective:   /82 (BP Location: Left arm, Patient Position: Sitting)   Pulse 61   Ht 5' 7" (1.702 m)   Wt 98.9 kg (218 lb)   SpO2 96%   BMI 34.14 kg/m²     Physical Exam  Vitals reviewed.   Constitutional:       Appearance: He is obese. He is not ill-appearing.   HENT:      Head: Normocephalic.   Eyes:      Pupils: Pupils are equal, round, and reactive to light.   Cardiovascular:      Rate and Rhythm: Normal rate and regular rhythm.      Pulses: Normal pulses.      Heart sounds: Normal heart sounds.   Pulmonary:      Effort: Pulmonary effort is normal.   Musculoskeletal:         General: Normal range of motion.      Cervical back: Normal range of motion.   Skin:     General: Skin is warm and dry.      Capillary Refill: Capillary refill takes less than 2 seconds.   Neurological:      General: No focal deficit present.      Mental Status: He is alert and oriented to person, place, and time.   Psychiatric:         Mood and " Affect: Mood normal.           Assessment:     1. Heart failure with mid-range ejection fraction              Plan:   Heart failure with mid-range ejection fraction  EF 40% -->NICM  Euvolemic, asymptomatic  GDMT: Losartan, jardiance, stopped lopressor (dizziness)  Sleep study pending

## 2025-02-13 NOTE — DISCHARGE INSTRUCTIONS
Your surgery has been scheduled for:2/24/2025    You should report to:[facility],   located on the [facility] side of the   Second floor of the [facility] ([phone])      Please Note   Tell your doctor if you take Aspirin, products containing Aspirin, herbal medications  or blood thinners, such as Coumadin, Ticlid, or Plavix.  (Consult your provider regarding holding or stopping before surgery).  Arrange for someone to drive you home following surgery.  You will not be allowed to leave the surgical facility alone or drive yourself home following sedation and anesthesia.    Before Surgery  Stop taking all herbal medications, vitamins, and supplements 7 days prior to surgery  No Motrin/Advil (Ibuprofen) 7 days before surgery  No Aleve (Naproxen) 7 days before surgery  Stop Taking Asprin, products containing Asprin ___7__days before surgery  Stop taking blood thinners_______days before surgery  No Goody's/BC  Powder 7 days before surgery  Refrain from drinking alcoholic beverages for 24hours before and after surgery  Stop or limit smoking ____7_____days before surgery  You may take Tylenol for pain    Night before Surgery  Do not eat or drink after midnight  Take a shower or bath (shower is recommended).  Bathe with Hibiclens soap or an antibacterial soap from the neck down.  If not supplied by your surgeon, hibiclens soap will need to be purchased over the counter in pharmacy.  Rinse soap off thoroughly.  Shampoo your hair with your regular shampoo    The Day of Surgery  Take another bath or shower with hibiclens or any antibacterial soap, to reduce the chance of infection.  Take heart and blood pressure medications with a small sip of water, as advised by the perioperative team.  Do not take fluid pills  You may brush your teeth and rinse your mouth, but do not swall any additional water.   Do not apply perfumes, powder, body lotions or deodorant on the day of  surgery.  Nail polish should be removed.  Do not wear makeup or moisturizer  Wear comfortable clothes, such as a button front shirt and loose fitting pants.  Leave all jewelry, including body piercings, and valuables at home.    Bring any devices you will neeed after surgery such as crutches or canes.  If you have sleep apnea, please bring your CPAP machine  In the event that your physical condition changes including the onset of a cold or respiratory illness, or if you have to delay or cancel your surgery, please notify your surgeon.     Anesthesia: General Anesthesia     You are watched continuously during your procedure by your anesthesia provider.     Youre due to have surgery. During surgery, youll be given medicine called anesthesia or anesthetic. This will keep you comfortable and pain-free. Your anesthesia provider will use general anesthesia.  What is general anesthesia?  General anesthesia puts you into a state like deep sleep. It goes into the bloodstream (IV anesthetics), into the lungs (gas anesthetics), or both. You feel nothing during the procedure. You will not remember it. During the procedure, the anesthesia provider monitors you continuously. He or she checks your heart rate and rhythm, blood pressure, breathing, and blood oxygen.  IV anesthetics. IV anesthetics are given through an IV line in your arm. Theyre often given first. This is so you are asleep before a gas anesthetic is started. Some kinds of IV anesthetics relieve pain. Others relax you. Your doctor will decide which kind is best in your case.  Gas anesthetics. Gas anesthetics are breathed into the lungs. They are often used to keep you asleep. They can be given through a facemask or a tube placed in your larynx or trachea (breathing tube).  If you have a facemask, your anesthesia provider will most likely place it over your nose and mouth while youre still awake. Youll breathe oxygen through the mask as your IV anesthetic is  started. Gas anesthetic may be added through the mask.  If you have a tube in the larynx or trachea, it will be inserted into your throat after youre asleep.  Anesthesia tools and medicines  You will likely have:  IV anesthetics. These are put into an IV line into your bloodstream.  Gas anesthetics. You breathe these anesthetics into your lungs, where they pass into your bloodstream.  Pulse oximeter. This is a small clip that is attached to the end of your finger. This measures your blood oxygen level.  Electrocardiography leads (electrodes). These are small sticky pads that are placed on your chest. They record your heart rate and rhythm.  Blood pressure cuff. This reads your blood pressure.  Risks and possible complications  General anesthesia has some risks. These include:  Breathing problems  Nausea and vomiting  Sore throat or hoarseness (usually temporary)  Allergic reaction to the anesthetic  Irregular heartbeat (rare)  Cardiac arrest (rare)   Anesthesia safety  Follow all instructions you are given for how long not to eat or drink before your procedure.  Be sure your doctor knows what medicines and drugs you take. This includes over-the-counter medicines, herbs, supplements, alcohol or other drugs. You will be asked when those were last taken.  Have an adult family member or friend drive you home after the procedure.  For the first 24 hours after your surgery:  Do not drive or use heavy equipment.  Do not make important decisions or sign legal documents. If important decisions or signing legal documents is necessary during the first 24 hours after surgery, have a trusted family member or spouse act on your behalf.  Avoid alcohol.  Have a responsible adult stay with you. He or she can watch for problems and help keep you safe.  Date Last Reviewed: 12/1/2016 © 2000-2017 [employer]. [address] [place] [address], [place] [address]. All rights reserved. This information is not intended as a substitute  for professional medical care. Always follow your healthcare professional's instructions.

## 2025-02-17 ENCOUNTER — HOSPITAL ENCOUNTER (OUTPATIENT)
Dept: PREADMISSION TESTING | Facility: HOSPITAL | Age: 40
Discharge: HOME | End: 2025-02-17
Attending: UROLOGY | Admitting: UROLOGY
Payer: COMMERCIAL

## 2025-02-17 ENCOUNTER — OFFICE VISIT (OUTPATIENT)
Dept: UROLOGY | Facility: CLINIC | Age: 40
End: 2025-02-17
Payer: COMMERCIAL

## 2025-02-17 ENCOUNTER — OFFICE VISIT (OUTPATIENT)
Dept: INTERNAL MEDICINE | Facility: CLINIC | Age: 40
End: 2025-02-17
Payer: COMMERCIAL

## 2025-02-17 ENCOUNTER — RESEARCH ENCOUNTER (OUTPATIENT)
Dept: UROLOGY | Facility: CLINIC | Age: 40
End: 2025-02-17

## 2025-02-17 VITALS
RESPIRATION RATE: 16 BRPM | BODY MASS INDEX: 34.37 KG/M2 | TEMPERATURE: 98 F | HEART RATE: 68 BPM | SYSTOLIC BLOOD PRESSURE: 116 MMHG | WEIGHT: 219 LBS | DIASTOLIC BLOOD PRESSURE: 86 MMHG | HEIGHT: 67 IN | OXYGEN SATURATION: 96 %

## 2025-02-17 DIAGNOSIS — N28.89 LEFT RENAL MASS: Primary | ICD-10-CM

## 2025-02-17 DIAGNOSIS — F41.9 ANXIETY: ICD-10-CM

## 2025-02-17 DIAGNOSIS — K76.0 FATTY LIVER: ICD-10-CM

## 2025-02-17 DIAGNOSIS — N28.89 LEFT RENAL MASS: ICD-10-CM

## 2025-02-17 DIAGNOSIS — Z01.818 PREOP EXAMINATION: Primary | ICD-10-CM

## 2025-02-17 DIAGNOSIS — M19.90 ARTHRITIS: ICD-10-CM

## 2025-02-17 DIAGNOSIS — G47.33 OBSTRUCTIVE SLEEP APNEA: ICD-10-CM

## 2025-02-17 DIAGNOSIS — R39.9 LOWER URINARY TRACT SYMPTOMS (LUTS): ICD-10-CM

## 2025-02-17 DIAGNOSIS — I50.22 HEART FAILURE WITH MID-RANGE EJECTION FRACTION: ICD-10-CM

## 2025-02-17 DIAGNOSIS — K21.9 GASTROESOPHAGEAL REFLUX DISEASE, UNSPECIFIED WHETHER ESOPHAGITIS PRESENT: ICD-10-CM

## 2025-02-17 DIAGNOSIS — I10 HYPERTENSION, UNSPECIFIED TYPE: ICD-10-CM

## 2025-02-17 DIAGNOSIS — N20.0 KIDNEY STONES: ICD-10-CM

## 2025-02-17 DIAGNOSIS — N28.9 RENAL IMPAIRMENT: ICD-10-CM

## 2025-02-17 DIAGNOSIS — E34.9 TESTOSTERONE DEFICIENCY: ICD-10-CM

## 2025-02-17 DIAGNOSIS — U07.1 COVID-19: ICD-10-CM

## 2025-02-17 PROBLEM — I50.9 NEW ONSET OF CONGESTIVE HEART FAILURE: Status: RESOLVED | Noted: 2024-11-06 | Resolved: 2025-02-17

## 2025-02-17 PROBLEM — J32.9 SINUSITIS: Status: RESOLVED | Noted: 2021-08-17 | Resolved: 2025-02-17

## 2025-02-17 PROBLEM — I20.0 UNSTABLE ANGINA: Status: RESOLVED | Noted: 2024-11-06 | Resolved: 2025-02-17

## 2025-02-17 PROBLEM — F17.200 NICOTINE DEPENDENCE: Status: RESOLVED | Noted: 2023-01-24 | Resolved: 2025-02-17

## 2025-02-17 PROBLEM — J30.9 ALLERGIC RHINITIS: Status: RESOLVED | Noted: 2023-01-24 | Resolved: 2025-02-17

## 2025-02-17 PROBLEM — Z23 NEED FOR VACCINATION: Status: RESOLVED | Noted: 2024-07-22 | Resolved: 2025-02-17

## 2025-02-17 PROBLEM — R05.9 COUGH: Status: RESOLVED | Noted: 2021-08-17 | Resolved: 2025-02-17

## 2025-02-17 PROBLEM — J40 BRONCHITIS: Status: RESOLVED | Noted: 2021-08-17 | Resolved: 2025-02-17

## 2025-02-17 RX ORDER — ASPIRIN 81 MG/1
81 TABLET ORAL DAILY
COMMUNITY

## 2025-02-17 RX ORDER — VENLAFAXINE 25 MG/1
25 TABLET ORAL DAILY
COMMUNITY
End: 2025-02-20 | Stop reason: CLARIF

## 2025-02-17 RX ORDER — METOPROLOL TARTRATE 25 MG/1
25 TABLET, FILM COATED ORAL 2 TIMES DAILY
COMMUNITY

## 2025-02-17 NOTE — H&P (VIEW-ONLY)
/Urology ([facility]) H&P for upcoming procedure  Staff:  Peter Looc MD    CC: left renal mass    HPI:  Syed Casanova is a 39 y.o. male noted to have LLP renal mass.  CT abd W WO 12/6/2024- 4.5 cm endophytic, enhancing LLP solid mass. No nodes, thrombus. Single artery single vein. Mass extends into hilar fat. No macroscopic fat or central scar.  RENAL score-10x (22% MCR)  Co-morbidities- CHF, MI, obesity, anxiety disorder, SAJAN. Has not had sleep study yet. . Stopped smoking 2 months. Steven is EMS.  November CXR- no metastasis.       1/24/2025-F/U visit after staging  CT chest- no metastasis  MRI abd W WO-4.6 x 4.1 x 4.2 cm, no thrombus or nodes  Creatinine-1.3 (eGFR >60)    Surgical history: laparoscopic appendectomy approximately 10 years ago     ROS: Negative except for as stated above    Past Medical History:   Diagnosis Date    Cancer     kidney    Hypertension        Past Surgical History:   Procedure Laterality Date    APPENDECTOMY      LEFT HEART CATHETERIZATION Left 11/7/2024    Procedure: Left heart cath;  Surgeon: Lenny Leon MD;  Location: [facility] CATH LAB;  Service: Cardiology;  Laterality: Left;       Social History     Socioeconomic History    Marital status:    Tobacco Use    Smoking status: Former     Types: Cigarettes     Passive exposure: Never    Smokeless tobacco: Never   Substance and Sexual Activity    Alcohol use: Yes     Comment: socially    Drug use: Never    Sexual activity: Yes     Social Drivers of Health     Financial Resource Strain: Low Risk  (2/16/2025)    Overall Financial Resource Strain (CARDIA)     Difficulty of Paying Living Expenses: Not hard at all   Food Insecurity: No Food Insecurity (2/16/2025)    Hunger Vital Sign     Worried About Running Out of Food in the Last Year: Never true     Ran Out of Food in the Last Year: Never true   Transportation Needs: No Transportation Needs (2/16/2025)    PRAPARE - Transportation     Lack of  "Transportation (Medical): No     Lack of Transportation (Non-Medical): No   Physical Activity: Inactive (2/16/2025)    Exercise Vital Sign     Days of Exercise per Week: 0 days     Minutes of Exercise per Session: 0 min   Stress: No Stress Concern Present (2/16/2025)    [employer] [place] [employer] - Occupational Stress Questionnaire     Feeling of Stress : Only a little   Housing Stability: Low Risk  (2/16/2025)    Housing Stability Vital Sign     Unable to Pay for Housing in the Last Year: No     Number of Times Moved in the Last Year: 0     Homeless in the Last Year: No       Family History   Problem Relation Name Age of Onset    No Known Problems Mother      No Known Problems Father      Heart attack Maternal Grandfather         Review of patient's allergies indicates:  No Known Allergies    Medications Ordered Prior to Encounter[1]    Anticoagulation:  Yes ASA 81    Physical Exam:  Estimated body mass index is 34.14 kg/m² as calculated from the following:    Height as of 1/30/25: 5' 7" (1.702 m).    Weight as of 1/30/25: 98.9 kg (218 lb).     General: No acute distress, well developed. AAOx3  Head: Normocephalic, Atraumatic  Eyes: Extra-occular movements intact, No discharge  Neck: supple, symmetrical, trachea midline  Lungs: normal respiratory effort, no respiratory distress, no wheezes  CV: regular rate, 2+ pulses  Abdomen: soft, non-tender, non-distended, no organomegaly, unable to find scar from appendectomy    MSK: no edema, no deformities, normal ROM  Skin: skin color, texture, turgor normal.  Neurologic: no focal deficits, sensation intact     Labs:    Urine dipstick today shows negative for all components.    Lab Results   Component Value Date    WBC 9.87 11/11/2024    HGB 16.1 11/11/2024    HCT 47.0 11/11/2024    MCV 88.8 11/11/2024     11/11/2024           BMP  Lab Results   Component Value Date     01/24/2025    K 4.9 01/24/2025     01/24/2025    CO2 25 01/24/2025    " BUN 22 (H) 01/24/2025    CREATININE 1.3 01/24/2025    CALCIUM 9.3 01/24/2025    ANIONGAP 8 01/24/2025    EGFRNORACEVR >60.0 01/24/2025     Assessment: Syed Casanova is a 39 y.o. male with left renal mass, suspicious for cT1b RCC    Plan:     1. To OR on 2/24/25 for left robotic nephrectomy  2. Seen by Dr Hui today for pre-op clearance and he is optimized  3. Type and screen ordered preoperatively. The risks, benefits, and indications of a blood transfusion were discussed. The patient was given a chance to ask questions and all questions answered to his satisfaction. Consent obtained.   4. The risks and benefits of participating in our clinical trial have been discussed and the patient has consented for the research study here at [facility].     Emilia Armando MD           [1]   Current Outpatient Medications on File Prior to Visit   Medication Sig Dispense Refill    busPIRone (BUSPAR) 10 MG tablet Take 1 tablet (10 mg total) by mouth 2 (two) times daily. (Patient taking differently: Take 10 mg by mouth as needed.) 60 tablet 11    empagliflozin (JARDIANCE) 10 mg tablet Take 1 tablet (10 mg total) by mouth once daily. 90 tablet 1    losartan (COZAAR) 25 MG tablet Take 1 tablet (25 mg total) by mouth once daily. 90 tablet 3    magnesium oxide 400 mg magnesium Tab Take 1 tablet by mouth once daily.      multivitamin (THERAGRAN) per tablet Take 1 tablet by mouth once daily.      pantoprazole (PROTONIX) 40 MG tablet Take 1 tablet (40 mg total) by mouth once daily. 90 tablet 3     No current facility-administered medications on file prior to visit.

## 2025-02-17 NOTE — ASSESSMENT & PLAN NOTE
He had a heart catheterization in November of 2024      The pre-procedure left ventricular end diastolic pressure was 8.    The estimated blood loss was <50 mL.    There was non-obstructive coronary artery disease. Minor luminal irregularities only.    Right dominant circulation.    Risk factors for heart disease    Blood pressure  Tobacco smoke which he quit    Medication   Aspirin   Beta-blocker  Jardiance to be held 3 days prior to surgery and the last day of use is on the 20th of February  He believes that he had a heart attack at some point in the past from sleep apnea  Losartan-told him not to take on the morning of surgery    His job involves physical activity involving walking  He is having shortness a breath with physical activities reducing his ability to do physical activities    He is not having problems with fluid retention  He has short of breath upon laying down  Upon laying down, he feels the heart racing and has difficulty breathing-this happens when he forgets taking metoprolol and within half an hour of taking metoprolol, his heart racing and breathing gets better  He also is noticing short of breath during the daytime that he attributes to deconditioning and he plans on going on a exercise regimen    He is watching the salt intake    He has chest discomfort on both sides on the upper chest  This has been a longstanding problem for about 10 years although since he had a heart attack diagnosed towards end of last year he has been having this more frequently  This is non tender  This is on related to physical activity, or positions  Unrelated to belching or burping  He has discussed this with his heart doctor and to his understanding this is felt to be related to his anxiety and I personally wonder if that has a relation of acid reflux in this also    These symptoms are not new and he has been dealing with the and his his heart doctor is aware of this     He can take a flight of stairs, can cut  grass with a lawnmower, 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     Please keep a record of the Input and Out put and weigh patient daily so that fluid overload can be detected and acted upon promptly . I suggest providing low  sodium diet   I suggest avoidance of the ordering of continuous IV fluids and if IV fluids are required ,to order for a specific duration of time and consider ordering lower IV fluid rate        Aspirin for heart failure that he last took on 16th of February  I have discussed the risks benefits of aspirin use and he is comfortable holding aspirin for surgery

## 2025-02-17 NOTE — ASSESSMENT & PLAN NOTE
History of COVID in 2020    Did not require hospitalization, intubation or supplemental oxygen use   Had been vaccinated   Recovered from COVID    COVID screening     No fever   No cough   No sore throat   No loss of taste or smell   No muscle aches   No nausea, vomiting , diarrhea

## 2025-02-17 NOTE — ASSESSMENT & PLAN NOTE
He takes longer time to empty his bladder  To his understanding he does not have enlarged prostate      Increased risk of post operative urinary retention  I suggest monitoring the  bladder volume with a view to decompress the bladder to avoid bladder stretching and resultant urinary retention   To minimize the risk of postoperative urinary retention address constipation( if applicable), increased ambulation, minimize opioid and

## 2025-02-17 NOTE — PROGRESS NOTES
[facility]  Progress Note    Patient Name: Syed Casanova  MRN: [id number]  Date of Evaluation- 02/20/2025  PCP- Anselmo Cornell MD    Future cases for Syed Casanova [[id number]]       Case ID Status Date Time Lit Procedure Provider Location    [id number] [facility] 2/24/2025  7:00  XI ROBOTIC NEPHRECTOMY possible open Beronica, Peter ALBARRAN MD [[id number]] [facility] OR 2ND FLR            HPI:  History of present illness- I had the pleasure of meeting this pleasant 39 y.o. gentleman in the pre op clinic prior to his elective Urological surgery. The patient is new to me .   I have obtained the history by speaking to the patient and by reviewing the electronic health records.    Events leading up to surgery / History of presenting illness -    I have obtained the information by speaking to him who is knowledgeable about his health  Towards the end of 2024, he was evaluated for acid reflux symptoms and possible gallbladder problems when ultrasound scan incidentally picked up a kidney mass  He had further imaging done and had local urology evaluation in [place] and he came to [facility] and is planning on having surgery done for this left renal mass on the 24th of February    Apart from nighttime urinary frequency--he has gone from not waking up at nighttime to urinate to around waking up 1 or 2 times at nighttime for the past 1 year  He has some difficulty emptying the bladder and to his understanding he does not have enlarged prostate    No pain from this    Sometime prior to this, he was found to have heart problems and he has quit tobacco smoking at that time-he smoked for about 13 years at 1 pack a day    Relevant health conditions of significance for the perioperative period/ History of presenting illness -    NICM  EF 40%-attributed to sleep apnea  Sleep apnea-on CPAP  Acid reflux  Hypertension  History of COVID  Kidney stones-2 episodes  Fatty liver    Lives with wife in a single-level house  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  He worked in the  in the past and up until 2016-and his job us physical done  Pets- 1 dog  Children  -2 sons-age 13 and age 17 and he has 4 step children  Has help post op    Not known to have Stroke/ Mini stroke ,Prediabetes , Diabetes Mellitus, Lung problem, Thyroid problem,  deep vein thrombosis, pulmonary embolism,  blood vessels stent ,  edema, mental health problems , gout, allergies , constipation             Subjective/ Objective:     Chief complaint-Preoperative evaluation, Perioperative Medical management, complication reduction plan     Active cardiac conditions- none    Revised cardiac risk index predictors- history of heart failure    Functional capacity -Examples of physical activity  can take 1 flight of stairs----- He can undertake all the above activities without  chest pain,chest tightness,  ,dizziness,lightheadedness making his exercise tolerance more,  than 4 Mets.       Review of Systems   Constitutional:  Negative for chills and fever.        No unusual weight changes     HENT:          Sleep apnea   Eyes:         No unusual vision changes   Respiratory:          No cough , phlegm    No Hemoptysis   Cardiovascular:         As noted   Gastrointestinal:         Bowels- Regular  No overt GI/ blood losses   Endocrine:        Prednisone use > 20 mg daily for 3 weeks- none   Genitourinary:  Negative for dysuria.   Musculoskeletal:         As above       Skin:  Negative for rash.   Neurological:  Negative for syncope.        No unilateral weakness   Hematological:         Current use of Anticoagulants  none       No past medical history pertinent negatives.        No anesthesia, bleeding, cardiac problems, PONV with previous surgeries/procedures.  Medications and Allergies reviewed in epic.     FH- No anesthesia,bleeding / venous thrombosis ,   in family      Physical Exam  Blood pressure 116/86, pulse 68, temperature 98.3 °F (36.8 °C),  "temperature source Oral, resp. rate 16, height 5' 7" (1.702 m), weight 99.3 kg (219 lb), SpO2 96%.      Physical Exam  Constitutional- Vitals - Body mass index is 34.3 kg/m².,   Vitals:    02/17/25 0744   BP: 116/86   Pulse: 68   Resp: 16   Temp: 98.3 °F (36.8 °C)     General appearance-Conscious,Coherent  Eyes- No conjunctival icterus,pupils  round  and reactive to light   ENT-Oral cavity- moist    , Hearing grossly normal   Neck- No thyromegaly ,Trachea -central, No jugular venous distension,   No Carotid Bruit   Cardiovascular -Heart Sounds- Normal  and  no murmur   , No gallop rhythm   Respiratory - Normal Respiratory Effort, Normal breath sounds,  no wheeze , and  no forced expiratory wheeze    Peripheral pitting pedal edema-- none , no calf pain   Gastrointestinal -Soft abdomen, No palpable masses, Non Tender,Liver,Spleen not palpable. No-- free fluid and shifting dullness  Musculoskeletal- No finger Clubbing. Strength grossly normal   Lymphatic-No Palpable cervical, axillary,Inguinal lymphadenopathy   Psychiatric - normal effect,Orientation  Rt Dorsalis pedis pulses-palpable    Lt Dorsalis pedis pulses- palpable   Rt Posterior tibial pulses -palpable   Left posterior tibial pulses -palpable   Miscellaneous -  no asterixis,  no dupuytren's contracture, and  no renal bruit   Investigations  Lab and Imaging have been reviewed in [facility].    Review of Medicine tests    EKG- I had independently reviewed the EKG from--Nov 2024  It was reported to be showing     Normal sinus rhythm   Normal ECG   When compared with ECG of 11-Nov-2024 17:54,   Nonspecific T wave abnormality no longer evident in Lateral leads     Review of clinical lab tests:  Lab Results   Component Value Date    CREATININE 1.3 01/24/2025    HGB 16.1 11/11/2024     11/11/2024         Review of old records- Was done and information gathered regards to events leading to surgery and health conditions of significance in the perioperative period   "       Review of old records- Was done and information gathered regards to events leading to surgery and health conditions of significance in the perioperative period.        Preoperative cardiac risk assessment-  The patient does not have any active cardiac conditions . Revised cardiac risk index predictors- 1---.Functional capacity is more than 4 Mets. He will be undergoing a Urological procedure that carries a Moderate Risk risk     Risk of a major Cardiac event ( Defined as death, myocardial infarction, or cardiac arrest at 30 days after noncardiac surgery), based on RCRI score     -6.0% -if  it is a laparoscopic approach    10.1% -if open approach        No further cardiac work up is indicated prior to proceeding with the surgery          American Society of Anesthesiologists Physical status classification ( ASA ) class: 3     Postoperative pulmonary complication risk assessment:      ARISCAT ( Canet) risk index- risk class -  Low-if laparoscopic approach and if the surgical incision does not involve upper  Assessment/Plan:     Anxiety  He is currently taking Effexor on a daily basis and BuSpar once in 2 nights for anxiety  He was diagnosed with PTSD but he is managing it  Doing good from a mental health stand point   Not under psychiatry , Therapist care   Has supportive family   On Medication that is helping   No Suicidal / Homicidal ideation      Discussed bleeding problems with mental health medication-to his understanding, he is going to have a total nephrectomy  Discussed the risks benefits of psychiatric medication around the time of surgery-he felt good about staying on the medication    Heart failure with mid-range ejection fraction  He had a heart catheterization in November of 2024      The pre-procedure left ventricular end diastolic pressure was 8.    The estimated blood loss was <50 mL.    There was non-obstructive coronary artery disease. Minor luminal irregularities only.    Right dominant  circulation.    Risk factors for heart disease    Blood pressure  Tobacco smoke which he quit    Medication   Aspirin   Beta-blocker  Jardiance to be held 3 days prior to surgery and the last day of use is on the 20th of February  He believes that he had a heart attack at some point in the past from sleep apnea  Losartan-told him not to take on the morning of surgery    His job involves physical activity involving walking  He is having shortness a breath with physical activities reducing his ability to do physical activities    He is not having problems with fluid retention  He has short of breath upon laying down  Upon laying down, he feels the heart racing and has difficulty breathing-this happens when he forgets taking metoprolol and within half an hour of taking metoprolol, his heart racing and breathing gets better  He also is noticing short of breath during the daytime that he attributes to deconditioning and he plans on going on a exercise regimen    He is watching the salt intake    He has chest discomfort on both sides on the upper chest  This has been a longstanding problem for about 10 years although since he had a heart attack diagnosed towards end of last year he has been having this more frequently  This is non tender  This is on related to physical activity, or positions  Unrelated to belching or burping  He has discussed this with his heart doctor and to his understanding this is felt to be related to his anxiety and I personally wonder if that has a relation of acid reflux in this also    These symptoms are not new and he has been dealing with the and his his heart doctor is aware of this     He can take a flight of stairs, can cut grass with a lawnmower, place with his teenage children-without any chest pain, chest tightness, short of breath dizziness lightheadedness and he gets winded on exertion that he attributes to being out of shape    Patient has history of heart failure that seems   compensated .     Please keep a record of the Input and Out put and weigh patient daily so that fluid overload can be detected and acted upon promptly . I suggest providing low  sodium diet   I suggest avoidance of the ordering of continuous IV fluids and if IV fluids are required ,to order for a specific duration of time and consider ordering lower IV fluid rate        Aspirin for heart failure that he last took on 16th of February  I have discussed the risks benefits of aspirin use and he is comfortable holding aspirin for surgery    Left renal mass  For surgery    COVID-19  History of COVID in 2020    Did not require hospitalization, intubation or supplemental oxygen use   Had been vaccinated   Recovered from COVID    COVID screening     No fever   No cough   No sore throat   No loss of taste or smell   No muscle aches   No nausea, vomiting , diarrhea     Testosterone deficiency  Currently not taking testosterone     No    -Breast pain   -Nipple discharge     No overt hypo or hyperthyroid symptoms Growth Hormone     No Increased hand/ Foot size     No-Easy bruising   -Acne  - Striae     No      - Polyuria  -Polydipsia    Off for 2 years          Gastroesophageal reflux disease    Does not sound Cardiac   Tips to control reflux discussed     I suggest aspiration precautions    I suggest watching fatty and spicy food  Citrus and tomato based  Alcohol, caffeine, soda  Chocolate, peppermint spearmint  Not to lay down within 3 hours after eating  Not to snack before going to bed  Sleep elevated  Watching weight  Watching anti-inflammatory medication    Contributing factors for Reflux     NSAID medication    Bedtime closer to eating either snack or dinner    Arthritis  Hands and neck  No suggestions of cervical myelopathy  Was on anti-inflammatory medication in the past that he is currently not on because of reflux    Obstructive sleep apnea  Sleep apnea   Uses CPAP   Suggested bringing for hospital use .   Informed  the risk of worsening sleep apnea in the perioperative period and suggest using CPAP/BIPAP use any time in 24 hrs ( day or night )for planned sleep     I suggest  caution with usage of medication that can cause respiratory suppression in the perioperative period    Avoidance of  supine sleep, weight gain , alcoholic beverages , care with , sedative , CNS depressant use indicated  since all of these can worsen SAJAN      Suggested not to drive he feels sleepy       BMI 34.0-34.9,adult  Weight related conditions     Known to have     HTN  Sleep apnea   Acid reflux   Fatty liver   Osteoarthritis    Not troubled with / Not known to have       Type 2 Diabetes   Prediabetes   Gout       Encouraged maintaining healthy weight for improved health     HTN (hypertension)    Hypertension-  Blood pressure is acceptable .  I suggest holding ----losartan---- on the morning of the surgery and can continue that  post operatively under blood pressure, electrolyte and renal function monitoring as long as they are acceptable.I suggest addressing pain control as uncontrolled pain can increased blood pressure      Kidney stones  Last episode 2007    Kidney stones-2 episodes    Discussed hydration    Discussed hydration   Not known to have elevated calcium, Uric acid   Not known have parathyroid problem, Gout      Fatty liver  He has cut down on his alcohol intake and the likely reason for fatty liver could be either alcohol intake or weight and he is working on both of them and hopefully the hopefully fatty liver will improve after weight loss      No history  of cirrhosis of liver or suggestions of Liver  decompensation   No Jaundice , dark urine , pale stool   No varices history    Currently not on a hepatology care  He wants to follow up with this at a later time    INR-N      Lower urinary tract symptoms (LUTS)  He takes longer time to empty his bladder  To his understanding he does not have enlarged prostate      Increased risk of  post operative urinary retention  I suggest monitoring the  bladder volume with a view to decompress the bladder to avoid bladder stretching and resultant urinary retention   To minimize the risk of postoperative urinary retention address constipation( if applicable), increased ambulation, minimize opioid and      Renal impairment  Creatinine stable  Stages of CKD discussed  Deleterious effects NSAID's , Beneficial effects of Hydration discussed   Acetaminophen ( If not intolerant ) as needed for pain     I  suggest monitoring renal function, in put and out put status lisa-operatively. I  suggest avoiding nephrotoxic medication including NSAIDs, COX2 inhibitors, intravenous contrast agent,avoiding hypotension to prevent further renal impairment.   Care with intravenous contrast discussed         Preventive perioperative care    Thromboembolic prophylaxis:  His risk factors for thrombosis include cancer  surgical procedure.I suggest  thromboembolic prophylaxis ( mechanical/pharmacological, weighing the risk benefits of pharmacological agent use considering lisa procedural bleeding )  during the perioperative period.I suggested being active in the post operative period.      Postoperative pulmonary complication prophylaxis-Risk factors for post operative pulmonary complications include surgery lasting over 3 hours and proximity of the surgical site to the lungs- I suggest incentive spirometry use, early ambulation, end tidal carbon dioxide monitoring, and pain control so as to avoid diaphragmatic splinting  , oral care , head end of bed elevation      Renal complication prophylaxis-Risk factors for renal complications include pre-existing renal disease and hypertension . I suggest keeping him well hydrated and avoidance/ minimizing the use of  NSAID's,CONTEH 2 Inhibitors ,IV contrast if possible in the perioperative period -he is currently not on fluid restriction     Surgical site Infection Prophylaxis-I  suggest  appropriate antibiotic for Prophylaxis against Surgical site infections  No reported Staph infection  Skin antibacterial discussed          In view of LUTS the patient  is at risk of postoperative urinary retention.  I suggest avoidance / minimizing the of  Benzodiazepines,Anticholinergic medication,antihistamines ( Benadryl) , if possible in the perioperative period. I suggest using the minimum possible use of opioids for the minimum period of time in the perioperative period. Benadryl avoidance suggested      This visit was focused on Preoperative evaluation, Perioperative Medical management, complication reduction plans. I suggest that the patient follows up with primary care or relevant sub specialists for ongoing health care.    I appreciate the opportunity to be involved in this patients care. Please feel free to contact me if there were any questions about this consultation.    Patient is optimized    Patient/ care giver/ Family member was instructed to call and update me about any changes to health,  medication, office visits ,testing out side of the [facility] operative care center , hospitalizations between now and surgery      Katie Hui MD  Internal Medicine  [facility]   Cell Phone- [phone]        Plan for optimization  He has reduced ejection fraction and has nonobstructive coronary artery disease    He has atypical, noncardiac sounding chest discomfort that he had for a long time which is not affecting his physical activity  He also has heart palpitations when he misses beta-blocker but otherwise he is doing well from a heart standpoint  He is not having any fluid problems and stays active otherwise    He takes longer time to empty his bladder  To his understanding he does not have enlarged prostate    Checked for over-the-counter medication      I have spent --75---- minutes of time which includes, time spent to prepare to see the patient , obtaining history ,performing  examination, counseling/Educating the patient , Documenting clinical information in the record    --  2/17- 15 16     Followed up on the lab testing   CBC is normal  --    2/17- 15 58     Correspondence from Cardiologist   Mr. Casanova was last seen 1/25/25 in our office with diagnosis of well  compensated chronic systolic heart failure due to non-ischemic cardiomyopathy.  He is at moderate cardiovascular risk for planned procedure.   --    2/20- 1633    Called to follow up , spoke to him  to address any concerns with the up coming surgery or any questions on Medication instructions -  Doing well ,No changes to Medication, Health -    He clarified that he is not taking Effexor and is taking Viibryd  It is an SSRI have discussed risk of bleeding with that medication  He feels that he can hold it for surgery but poor check with surgeon as I prefer not to stop his psychiatric medication abruptly that he has has been on for 2-3 weeks  He is currently not under psychiatrist to be able to consult the psychiatrist  --    2/20- 1738     Corresponded with the surgeon about SSRI use   Will Let him proceed with the surgery  Called to speak to him   Left a message   Will keep him on it will let him proceed   Call if has any questions

## 2025-02-17 NOTE — ASSESSMENT & PLAN NOTE
He has cut down on his alcohol intake and the likely reason for fatty liver could be either alcohol intake or weight and he is working on both of them and hopefully the hopefully fatty liver will improve after weight loss      No history  of cirrhosis of liver or suggestions of Liver  decompensation   No Jaundice , dark urine , pale stool   No varices history    Currently not on a hepatology care  He wants to follow up with this at a later time    INR-N

## 2025-02-17 NOTE — ASSESSMENT & PLAN NOTE
Sleep apnea   Uses CPAP   Suggested bringing for hospital use .   Informed the risk of worsening sleep apnea in the perioperative period and suggest using CPAP/BIPAP use any time in 24 hrs ( day or night )for planned sleep     I suggest  caution with usage of medication that can cause respiratory suppression in the perioperative period    Avoidance of  supine sleep, weight gain , alcoholic beverages , care with , sedative , CNS depressant use indicated  since all of these can worsen SAJAN      Suggested not to drive he feels sleepy

## 2025-02-17 NOTE — ASSESSMENT & PLAN NOTE
Creatinine stable  Stages of CKD discussed  Deleterious effects NSAID's , Beneficial effects of Hydration discussed   Acetaminophen ( If not intolerant ) as needed for pain     I  suggest monitoring renal function, in put and out put status lisa-operatively. I  suggest avoiding nephrotoxic medication including NSAIDs, COX2 inhibitors, intravenous contrast agent,avoiding hypotension to prevent further renal impairment.   Care with intravenous contrast discussed

## 2025-02-17 NOTE — ASSESSMENT & PLAN NOTE
Last episode 2007    Kidney stones-2 episodes    Discussed hydration    Discussed hydration   Not known to have elevated calcium, Uric acid   Not known have parathyroid problem, Gout

## 2025-02-17 NOTE — ASSESSMENT & PLAN NOTE
He is currently taking Effexor on a daily basis and BuSpar once in 2 nights for anxiety  He was diagnosed with PTSD but he is managing it  Doing good from a mental health stand point   Not under psychiatry , Therapist care   Has supportive family   On Medication that is helping   No Suicidal / Homicidal ideation      Discussed bleeding problems with mental health medication-to his understanding, he is going to have a total nephrectomy  Discussed the risks benefits of psychiatric medication around the time of surgery-he felt good about staying on the medication

## 2025-02-17 NOTE — HPI
History of present illness- I had the pleasure of meeting this pleasant 39 y.o. gentleman in the pre op clinic prior to his elective Urological surgery. The patient is new to me .   I have obtained the history by speaking to the patient and by reviewing the electronic health records.    Events leading up to surgery / History of presenting illness -    I have obtained the information by speaking to him who is knowledgeable about his health  Towards the end of 2024, he was evaluated for acid reflux symptoms and possible gallbladder problems when ultrasound scan incidentally picked up a kidney mass  He had further imaging done and had local urology evaluation in [place] and he came to [facility] and is planning on having surgery done for this left renal mass on the 24th of February    Apart from nighttime urinary frequency--he has gone from not waking up at nighttime to urinate to around waking up 1 or 2 times at nighttime for the past 1 year  He has some difficulty emptying the bladder and to his understanding he does not have enlarged prostate    No pain from this    Sometime prior to this, he was found to have heart problems and he has quit tobacco smoking at that time-he smoked for about 13 years at 1 pack a day    Relevant health conditions of significance for the perioperative period/ History of presenting illness -    NICM  EF 40%-attributed to sleep apnea  Sleep apnea-on CPAP  Acid reflux  Hypertension  History of COVID  Kidney stones-2 episodes  Fatty liver    Lives with wife in a single-level house  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

## 2025-02-17 NOTE — ASSESSMENT & PLAN NOTE
Does not sound Cardiac   Tips to control reflux discussed     I suggest aspiration precautions    I suggest watching fatty and spicy food  Citrus and tomato based  Alcohol, caffeine, soda  Chocolate, peppermint spearmint  Not to lay down within 3 hours after eating  Not to snack before going to bed  Sleep elevated  Watching weight  Watching anti-inflammatory medication    Contributing factors for Reflux     NSAID medication    Bedtime closer to eating either snack or dinner

## 2025-02-17 NOTE — ASSESSMENT & PLAN NOTE
Currently not taking testosterone     No    -Breast pain   -Nipple discharge     No overt hypo or hyperthyroid symptoms Growth Hormone     No Increased hand/ Foot size     No-Easy bruising   -Acne  - Striae     No      - Polyuria  -Polydipsia    Off for 2 years

## 2025-02-17 NOTE — ASSESSMENT & PLAN NOTE
Hands and neck  No suggestions of cervical myelopathy  Was on anti-inflammatory medication in the past that he is currently not on because of reflux

## 2025-02-17 NOTE — ASSESSMENT & PLAN NOTE
Weight related conditions     Known to have     HTN  Sleep apnea   Acid reflux   Fatty liver   Osteoarthritis    Not troubled with / Not known to have       Type 2 Diabetes   Prediabetes   Gout       Encouraged maintaining healthy weight for improved health

## 2025-02-17 NOTE — PROGRESS NOTES
/Urology ([facility]) H&P for upcoming procedure  Staff:  Peter Loco MD    CC: left renal mass    HPI:  Syed Casanova is a 39 y.o. male noted to have LLP renal mass.  CT abd W WO 12/6/2024- 4.5 cm endophytic, enhancing LLP solid mass. No nodes, thrombus. Single artery single vein. Mass extends into hilar fat. No macroscopic fat or central scar.  RENAL score-10x (22% MCR)  Co-morbidities- CHF, MI, obesity, anxiety disorder, SAJAN. Has not had sleep study yet. . Stopped smoking 2 months. Steven is EMS.  November CXR- no metastasis.       1/24/2025-F/U visit after staging  CT chest- no metastasis  MRI abd W WO-4.6 x 4.1 x 4.2 cm, no thrombus or nodes  Creatinine-1.3 (eGFR >60)    Surgical history: laparoscopic appendectomy approximately 10 years ago     ROS: Negative except for as stated above    Past Medical History:   Diagnosis Date    Cancer     kidney    Hypertension        Past Surgical History:   Procedure Laterality Date    APPENDECTOMY      LEFT HEART CATHETERIZATION Left 11/7/2024    Procedure: Left heart cath;  Surgeon: Lenny Leon MD;  Location: [facility] CATH LAB;  Service: Cardiology;  Laterality: Left;       Social History     Socioeconomic History    Marital status:    Tobacco Use    Smoking status: Former     Types: Cigarettes     Passive exposure: Never    Smokeless tobacco: Never   Substance and Sexual Activity    Alcohol use: Yes     Comment: socially    Drug use: Never    Sexual activity: Yes     Social Drivers of Health     Financial Resource Strain: Low Risk  (2/16/2025)    Overall Financial Resource Strain (CARDIA)     Difficulty of Paying Living Expenses: Not hard at all   Food Insecurity: No Food Insecurity (2/16/2025)    Hunger Vital Sign     Worried About Running Out of Food in the Last Year: Never true     Ran Out of Food in the Last Year: Never true   Transportation Needs: No Transportation Needs (2/16/2025)    PRAPARE - Transportation     Lack of  "Transportation (Medical): No     Lack of Transportation (Non-Medical): No   Physical Activity: Inactive (2/16/2025)    Exercise Vital Sign     Days of Exercise per Week: 0 days     Minutes of Exercise per Session: 0 min   Stress: No Stress Concern Present (2/16/2025)    [employer] [place] [employer] - Occupational Stress Questionnaire     Feeling of Stress : Only a little   Housing Stability: Low Risk  (2/16/2025)    Housing Stability Vital Sign     Unable to Pay for Housing in the Last Year: No     Number of Times Moved in the Last Year: 0     Homeless in the Last Year: No       Family History   Problem Relation Name Age of Onset    No Known Problems Mother      No Known Problems Father      Heart attack Maternal Grandfather         Review of patient's allergies indicates:  No Known Allergies    Medications Ordered Prior to Encounter[1]    Anticoagulation:  Yes ASA 81    Physical Exam:  Estimated body mass index is 34.14 kg/m² as calculated from the following:    Height as of 1/30/25: 5' 7" (1.702 m).    Weight as of 1/30/25: 98.9 kg (218 lb).     General: No acute distress, well developed. AAOx3  Head: Normocephalic, Atraumatic  Eyes: Extra-occular movements intact, No discharge  Neck: supple, symmetrical, trachea midline  Lungs: normal respiratory effort, no respiratory distress, no wheezes  CV: regular rate, 2+ pulses  Abdomen: soft, non-tender, non-distended, no organomegaly, unable to find scar from appendectomy    MSK: no edema, no deformities, normal ROM  Skin: skin color, texture, turgor normal.  Neurologic: no focal deficits, sensation intact     Labs:    Urine dipstick today shows negative for all components.    Lab Results   Component Value Date    WBC 9.87 11/11/2024    HGB 16.1 11/11/2024    HCT 47.0 11/11/2024    MCV 88.8 11/11/2024     11/11/2024           BMP  Lab Results   Component Value Date     01/24/2025    K 4.9 01/24/2025     01/24/2025    CO2 25 01/24/2025    " BUN 22 (H) 01/24/2025    CREATININE 1.3 01/24/2025    CALCIUM 9.3 01/24/2025    ANIONGAP 8 01/24/2025    EGFRNORACEVR >60.0 01/24/2025     Assessment: Syed Casanova is a 39 y.o. male with left renal mass, suspicious for cT1b RCC    Plan:     1. To OR on 2/24/25 for left robotic nephrectomy  2. Seen by Dr Hui today for pre-op clearance and he is optimized  3. Type and screen ordered preoperatively. The risks, benefits, and indications of a blood transfusion were discussed. The patient was given a chance to ask questions and all questions answered to his satisfaction. Consent obtained.   4. The risks and benefits of participating in our clinical trial have been discussed and the patient has consented for the research study here at [facility].     Emilia Armando MD           [1]   Current Outpatient Medications on File Prior to Visit   Medication Sig Dispense Refill    busPIRone (BUSPAR) 10 MG tablet Take 1 tablet (10 mg total) by mouth 2 (two) times daily. (Patient taking differently: Take 10 mg by mouth as needed.) 60 tablet 11    empagliflozin (JARDIANCE) 10 mg tablet Take 1 tablet (10 mg total) by mouth once daily. 90 tablet 1    losartan (COZAAR) 25 MG tablet Take 1 tablet (25 mg total) by mouth once daily. 90 tablet 3    magnesium oxide 400 mg magnesium Tab Take 1 tablet by mouth once daily.      multivitamin (THERAGRAN) per tablet Take 1 tablet by mouth once daily.      pantoprazole (PROTONIX) 40 MG tablet Take 1 tablet (40 mg total) by mouth once daily. 90 tablet 3     No current facility-administered medications on file prior to visit.

## 2025-02-17 NOTE — ASSESSMENT & PLAN NOTE
Hypertension-  Blood pressure is acceptable .  I suggest holding ----losartan---- on the morning of the surgery and can continue that  post operatively under blood pressure, electrolyte and renal function monitoring as long as they are acceptable.I suggest addressing pain control as uncontrolled pain can increased blood pressure

## 2025-02-17 NOTE — OUTPATIENT SUBJECTIVE & OBJECTIVE
"Outpatient Subjective & Objective     Chief complaint-Preoperative evaluation, Perioperative Medical management, complication reduction plan     Active cardiac conditions- none    Revised cardiac risk index predictors- history of heart failure    Functional capacity -Examples of physical activity  can take 1 flight of stairs----- He can undertake all the above activities without  chest pain,chest tightness,  ,dizziness,lightheadedness making his exercise tolerance more,  than 4 Mets.       Review of Systems   Constitutional:  Negative for chills and fever.        No unusual weight changes     HENT:          Sleep apnea   Eyes:         No unusual vision changes   Respiratory:          No cough , phlegm    No Hemoptysis   Cardiovascular:         As noted   Gastrointestinal:         Bowels- Regular  No overt GI/ blood losses   Endocrine:        Prednisone use > 20 mg daily for 3 weeks- none   Genitourinary:  Negative for dysuria.   Musculoskeletal:         As above       Skin:  Negative for rash.   Neurological:  Negative for syncope.        No unilateral weakness   Hematological:         Current use of Anticoagulants  none       No past medical history pertinent negatives.        No anesthesia, bleeding, cardiac problems, PONV with previous surgeries/procedures.  Medications and Allergies reviewed in epic.     FH- No anesthesia,bleeding / venous thrombosis ,   in family      Physical Exam  Blood pressure 116/86, pulse 68, temperature 98.3 °F (36.8 °C), temperature source Oral, resp. rate 16, height 5' 7" (1.702 m), weight 99.3 kg (219 lb), SpO2 96%.      Physical Exam  Constitutional- Vitals - Body mass index is 34.3 kg/m².,   Vitals:    02/17/25 0744   BP: 116/86   Pulse: 68   Resp: 16   Temp: 98.3 °F (36.8 °C)     General appearance-Conscious,Coherent  Eyes- No conjunctival icterus,pupils  round  and reactive to light   ENT-Oral cavity- moist    , Hearing grossly normal   Neck- No thyromegaly ,Trachea -central, No " jugular venous distension,   No Carotid Bruit   Cardiovascular -Heart Sounds- Normal  and  no murmur   , No gallop rhythm   Respiratory - Normal Respiratory Effort, Normal breath sounds,  no wheeze , and  no forced expiratory wheeze    Peripheral pitting pedal edema-- none , no calf pain   Gastrointestinal -Soft abdomen, No palpable masses, Non Tender,Liver,Spleen not palpable. No-- free fluid and shifting dullness  Musculoskeletal- No finger Clubbing. Strength grossly normal   Lymphatic-No Palpable cervical, axillary,Inguinal lymphadenopathy   Psychiatric - normal effect,Orientation  Rt Dorsalis pedis pulses-palpable    Lt Dorsalis pedis pulses- palpable   Rt Posterior tibial pulses -palpable   Left posterior tibial pulses -palpable   Miscellaneous -  no asterixis,  no dupuytren's contracture, and  no renal bruit   Investigations  Lab and Imaging have been reviewed in [facility].    Review of Medicine tests    EKG- I had independently reviewed the EKG from--Nov 2024  It was reported to be showing     Normal sinus rhythm   Normal ECG   When compared with ECG of 11-Nov-2024 17:54,   Nonspecific T wave abnormality no longer evident in Lateral leads     Review of clinical lab tests:  Lab Results   Component Value Date    CREATININE 1.3 01/24/2025    HGB 16.1 11/11/2024     11/11/2024         Review of old records- Was done and information gathered regards to events leading to surgery and health conditions of significance in the perioperative period         Review of old records- Was done and information gathered regards to events leading to surgery and health conditions of significance in the perioperative period.    Outpatient Subjective & Objective

## 2025-02-18 NOTE — PROGRESS NOTES
I spoke with the patient and she will go get some otc nyquil but is wheezing and would like an inhaler as well.   Participant was consented for Dr. Malena Guy's Renal Stem Cell Study, IRB 2011.222 and the informed consent process was conducted by Emilia Armando MD. Please see scanned documents in Media tab reflecting the consenting process.       Tazorac Counseling:  Patient advised that medication is irritating and drying.  Patient may need to apply sparingly and wash off after an hour before eventually leaving it on overnight.  The patient verbalized understanding of the proper use and possible adverse effects of tazorac.  All of the patient's questions and concerns were addressed.

## 2025-02-20 RX ORDER — VILAZODONE HYDROCHLORIDE 10 MG/1
10 TABLET ORAL DAILY
COMMUNITY

## 2025-02-21 ENCOUNTER — TELEPHONE (OUTPATIENT)
Dept: INTERNAL MEDICINE | Facility: CLINIC | Age: 40
End: 2025-02-21
Payer: COMMERCIAL

## 2025-02-21 ENCOUNTER — DOCUMENTATION ONLY (OUTPATIENT)
Dept: UROLOGY | Facility: CLINIC | Age: 40
End: 2025-02-21
Payer: COMMERCIAL

## 2025-02-21 ENCOUNTER — TELEPHONE (OUTPATIENT)
Dept: UROLOGY | Facility: CLINIC | Age: 40
End: 2025-02-21
Payer: COMMERCIAL

## 2025-02-21 ENCOUNTER — PATIENT MESSAGE (OUTPATIENT)
Dept: INTERNAL MEDICINE | Facility: CLINIC | Age: 40
End: 2025-02-21
Payer: COMMERCIAL

## 2025-02-21 NOTE — PROGRESS NOTES
Oncology Social Worker received a message that Dr. Loco's staff reached out this morning about patient having surgery on 2/24/25 and asking about the [employer]. Reviewed patient's chart - he is a 39 y.o. male with a left renal mass, suspicious for RCC, and he is scheduled for a left robotic nephrectomy per Dr. Loco. Called patient at his number [phone] as listed in his chart. Introduced myself and explained the reason for my call. Patient stated that he has made a reservation at the [facility] [employer] and they are driving in from home on Sun. Briefly informed him about an alternative lodging option of the [facility] [employer] [facility] [place] if his post op pathology is positive for cancer and lodging is needed for post op appointments. Offered ongoing assistance to patient and provided him with my direct number. Will remain available to assist as needs are identified.

## 2025-02-24 ENCOUNTER — ANESTHESIA (OUTPATIENT)
Dept: SURGERY | Facility: HOSPITAL | Age: 40
DRG: 660 | End: 2025-02-24
Payer: COMMERCIAL

## 2025-02-24 ENCOUNTER — HOSPITAL ENCOUNTER (INPATIENT)
Facility: HOSPITAL | Age: 40
LOS: 1 days | Discharge: HOME | DRG: 657 | End: 2025-02-25
Attending: UROLOGY | Admitting: UROLOGY
Payer: COMMERCIAL

## 2025-02-24 ENCOUNTER — RESEARCH ENCOUNTER (OUTPATIENT)
Dept: UROLOGY | Facility: CLINIC | Age: 40
End: 2025-02-24
Payer: COMMERCIAL

## 2025-02-24 DIAGNOSIS — Z01.818 PREOPERATIVE TESTING: ICD-10-CM

## 2025-02-24 DIAGNOSIS — N28.89 RENAL MASS: Primary | ICD-10-CM

## 2025-02-24 LAB
ABO + RH BLD: NORMAL
BLD GP AB SCN CELLS X3 SERPL QL: NORMAL
SPECIMEN OUTDATE: NORMAL

## 2025-02-24 PROCEDURE — 63600175 PHARM REV CODE 636 W HCPCS: Mod: JZ,TB

## 2025-02-24 PROCEDURE — 0TT14ZZ RESECTION OF LEFT KIDNEY, PERCUTANEOUS ENDOSCOPIC APPROACH: ICD-10-PCS | Performed by: UROLOGY

## 2025-02-24 PROCEDURE — 88342 IMHCHEM/IMCYTCHM 1ST ANTB: CPT | Mod: 26,,, | Performed by: PATHOLOGY

## 2025-02-24 PROCEDURE — 25000003 PHARM REV CODE 250

## 2025-02-24 PROCEDURE — 25000003 PHARM REV CODE 250: Performed by: STUDENT IN AN ORGANIZED HEALTH CARE EDUCATION/TRAINING PROGRAM

## 2025-02-24 PROCEDURE — 63600175 PHARM REV CODE 636 W HCPCS

## 2025-02-24 PROCEDURE — 20600001 HC STEP DOWN PRIVATE ROOM

## 2025-02-24 PROCEDURE — 64461 PVB THORACIC SINGLE INJ SITE: CPT

## 2025-02-24 PROCEDURE — 86900 BLOOD TYPING SEROLOGIC ABO: CPT

## 2025-02-24 PROCEDURE — 37000009 HC ANESTHESIA EA ADD 15 MINS: Performed by: UROLOGY

## 2025-02-24 PROCEDURE — 71000016 HC POSTOP RECOV ADDL HR: Performed by: UROLOGY

## 2025-02-24 PROCEDURE — 88307 TISSUE EXAM BY PATHOLOGIST: CPT | Performed by: PATHOLOGY

## 2025-02-24 PROCEDURE — 36000713 HC OR TIME LEV V EA ADD 15 MIN: Performed by: UROLOGY

## 2025-02-24 PROCEDURE — 36000712 HC OR TIME LEV V 1ST 15 MIN: Performed by: UROLOGY

## 2025-02-24 PROCEDURE — 71000015 HC POSTOP RECOV 1ST HR: Performed by: UROLOGY

## 2025-02-24 PROCEDURE — 63600175 PHARM REV CODE 636 W HCPCS: Performed by: STUDENT IN AN ORGANIZED HEALTH CARE EDUCATION/TRAINING PROGRAM

## 2025-02-24 PROCEDURE — 27000221 HC OXYGEN, UP TO 24 HOURS

## 2025-02-24 PROCEDURE — 50545 LAPARO RADICAL NEPHRECTOMY: CPT | Mod: LT,,, | Performed by: UROLOGY

## 2025-02-24 PROCEDURE — 37000008 HC ANESTHESIA 1ST 15 MINUTES: Performed by: UROLOGY

## 2025-02-24 PROCEDURE — 88341 IMHCHEM/IMCYTCHM EA ADD ANTB: CPT | Performed by: PATHOLOGY

## 2025-02-24 PROCEDURE — 88307 TISSUE EXAM BY PATHOLOGIST: CPT | Mod: 26,,, | Performed by: PATHOLOGY

## 2025-02-24 PROCEDURE — 99900035 HC TECH TIME PER 15 MIN (STAT)

## 2025-02-24 PROCEDURE — 8E0W4CZ ROBOTIC ASSISTED PROCEDURE OF TRUNK REGION, PERCUTANEOUS ENDOSCOPIC APPROACH: ICD-10-PCS | Performed by: UROLOGY

## 2025-02-24 PROCEDURE — 86920 COMPATIBILITY TEST SPIN: CPT | Performed by: UROLOGY

## 2025-02-24 PROCEDURE — 27201423 OPTIME MED/SURG SUP & DEVICES STERILE SUPPLY: Performed by: UROLOGY

## 2025-02-24 PROCEDURE — 88342 IMHCHEM/IMCYTCHM 1ST ANTB: CPT | Performed by: PATHOLOGY

## 2025-02-24 PROCEDURE — 94761 N-INVAS EAR/PLS OXIMETRY MLT: CPT

## 2025-02-24 PROCEDURE — 88341 IMHCHEM/IMCYTCHM EA ADD ANTB: CPT | Mod: 26,,, | Performed by: PATHOLOGY

## 2025-02-24 PROCEDURE — 71000033 HC RECOVERY, INTIAL HOUR: Performed by: UROLOGY

## 2025-02-24 RX ORDER — LIDOCAINE HYDROCHLORIDE 20 MG/ML
INJECTION, SOLUTION EPIDURAL; INFILTRATION; INTRACAUDAL; PERINEURAL
Status: DISCONTINUED | OUTPATIENT
Start: 2025-02-24 | End: 2025-02-24

## 2025-02-24 RX ORDER — DEXAMETHASONE SODIUM PHOSPHATE 4 MG/ML
INJECTION, SOLUTION INTRA-ARTICULAR; INTRALESIONAL; INTRAMUSCULAR; INTRAVENOUS; SOFT TISSUE
Status: DISCONTINUED | OUTPATIENT
Start: 2025-02-24 | End: 2025-02-24

## 2025-02-24 RX ORDER — HEPARIN SODIUM 5000 [USP'U]/ML
5000 INJECTION, SOLUTION INTRAVENOUS; SUBCUTANEOUS EVERY 8 HOURS
Status: DISCONTINUED | OUTPATIENT
Start: 2025-02-24 | End: 2025-02-25 | Stop reason: HOSPADM

## 2025-02-24 RX ORDER — FENTANYL CITRATE 50 UG/ML
25-200 INJECTION, SOLUTION INTRAMUSCULAR; INTRAVENOUS
Status: DISCONTINUED | OUTPATIENT
Start: 2025-02-24 | End: 2025-02-24 | Stop reason: HOSPADM

## 2025-02-24 RX ORDER — SODIUM CHLORIDE 0.9 % (FLUSH) 0.9 %
10 SYRINGE (ML) INJECTION
Status: DISCONTINUED | OUTPATIENT
Start: 2025-02-24 | End: 2025-02-24 | Stop reason: HOSPADM

## 2025-02-24 RX ORDER — SODIUM CHLORIDE 9 MG/ML
INJECTION, SOLUTION INTRAVENOUS CONTINUOUS
Status: DISCONTINUED | OUTPATIENT
Start: 2025-02-24 | End: 2025-02-24

## 2025-02-24 RX ORDER — IBUPROFEN 200 MG
24 TABLET ORAL
Status: DISCONTINUED | OUTPATIENT
Start: 2025-02-24 | End: 2025-02-24

## 2025-02-24 RX ORDER — PROPOFOL 10 MG/ML
VIAL (ML) INTRAVENOUS
Status: DISCONTINUED | OUTPATIENT
Start: 2025-02-24 | End: 2025-02-24

## 2025-02-24 RX ORDER — GLUCAGON 1 MG
1 KIT INJECTION
Status: DISCONTINUED | OUTPATIENT
Start: 2025-02-24 | End: 2025-02-24

## 2025-02-24 RX ORDER — METHOCARBAMOL 750 MG/1
750 TABLET, FILM COATED ORAL EVERY 6 HOURS PRN
Status: DISCONTINUED | OUTPATIENT
Start: 2025-02-24 | End: 2025-02-25 | Stop reason: HOSPADM

## 2025-02-24 RX ORDER — CEFAZOLIN SODIUM 1 G/3ML
INJECTION, POWDER, FOR SOLUTION INTRAMUSCULAR; INTRAVENOUS
Status: DISCONTINUED | OUTPATIENT
Start: 2025-02-24 | End: 2025-02-24

## 2025-02-24 RX ORDER — VILAZODONE HYDROCHLORIDE 10 MG/1
10 TABLET ORAL DAILY
Status: DISCONTINUED | OUTPATIENT
Start: 2025-02-24 | End: 2025-02-25 | Stop reason: HOSPADM

## 2025-02-24 RX ORDER — TALC
6 POWDER (GRAM) TOPICAL NIGHTLY PRN
Status: DISCONTINUED | OUTPATIENT
Start: 2025-02-24 | End: 2025-02-25 | Stop reason: HOSPADM

## 2025-02-24 RX ORDER — DIPHENHYDRAMINE HCL 25 MG
25 CAPSULE ORAL EVERY 12 HOURS PRN
Status: DISCONTINUED | OUTPATIENT
Start: 2025-02-24 | End: 2025-02-25 | Stop reason: HOSPADM

## 2025-02-24 RX ORDER — ONDANSETRON HYDROCHLORIDE 2 MG/ML
INJECTION, SOLUTION INTRAVENOUS
Status: DISCONTINUED | OUTPATIENT
Start: 2025-02-24 | End: 2025-02-24

## 2025-02-24 RX ORDER — KETOROLAC TROMETHAMINE 15 MG/ML
15 INJECTION, SOLUTION INTRAMUSCULAR; INTRAVENOUS
Status: DISCONTINUED | OUTPATIENT
Start: 2025-02-24 | End: 2025-02-24 | Stop reason: HOSPADM

## 2025-02-24 RX ORDER — OXYCODONE HYDROCHLORIDE 10 MG/1
10 TABLET ORAL EVERY 4 HOURS PRN
Refills: 0 | Status: DISCONTINUED | OUTPATIENT
Start: 2025-02-24 | End: 2025-02-25 | Stop reason: HOSPADM

## 2025-02-24 RX ORDER — CEFAZOLIN 2 G/1
2 INJECTION, POWDER, FOR SOLUTION INTRAMUSCULAR; INTRAVENOUS
Status: DISCONTINUED | OUTPATIENT
Start: 2025-02-24 | End: 2025-02-24 | Stop reason: HOSPADM

## 2025-02-24 RX ORDER — GLUCAGON 1 MG
1 KIT INJECTION
Status: DISCONTINUED | OUTPATIENT
Start: 2025-02-24 | End: 2025-02-24 | Stop reason: HOSPADM

## 2025-02-24 RX ORDER — BUPIVACAINE HYDROCHLORIDE 7.5 MG/ML
INJECTION, SOLUTION EPIDURAL; RETROBULBAR
Status: COMPLETED | OUTPATIENT
Start: 2025-02-24 | End: 2025-02-24

## 2025-02-24 RX ORDER — PHENAZOPYRIDINE HYDROCHLORIDE 100 MG/1
200 TABLET, FILM COATED ORAL 3 TIMES DAILY PRN
Status: DISCONTINUED | OUTPATIENT
Start: 2025-02-24 | End: 2025-02-25 | Stop reason: HOSPADM

## 2025-02-24 RX ORDER — HYDROMORPHONE HYDROCHLORIDE 1 MG/ML
0.2 INJECTION, SOLUTION INTRAMUSCULAR; INTRAVENOUS; SUBCUTANEOUS EVERY 5 MIN PRN
Status: DISCONTINUED | OUTPATIENT
Start: 2025-02-24 | End: 2025-02-24 | Stop reason: HOSPADM

## 2025-02-24 RX ORDER — LIDOCAINE HYDROCHLORIDE 10 MG/ML
1 INJECTION, SOLUTION EPIDURAL; INFILTRATION; INTRACAUDAL; PERINEURAL ONCE
Status: DISCONTINUED | OUTPATIENT
Start: 2025-02-24 | End: 2025-02-24 | Stop reason: HOSPADM

## 2025-02-24 RX ORDER — ACETAMINOPHEN 500 MG
1000 TABLET ORAL EVERY 6 HOURS
Status: DISCONTINUED | OUTPATIENT
Start: 2025-02-24 | End: 2025-02-25 | Stop reason: HOSPADM

## 2025-02-24 RX ORDER — HYDROMORPHONE HYDROCHLORIDE 1 MG/ML
1 INJECTION, SOLUTION INTRAMUSCULAR; INTRAVENOUS; SUBCUTANEOUS
Status: DISCONTINUED | OUTPATIENT
Start: 2025-02-24 | End: 2025-02-25 | Stop reason: HOSPADM

## 2025-02-24 RX ORDER — FENTANYL CITRATE 50 UG/ML
INJECTION, SOLUTION INTRAMUSCULAR; INTRAVENOUS
Status: DISCONTINUED | OUTPATIENT
Start: 2025-02-24 | End: 2025-02-24

## 2025-02-24 RX ORDER — HEPARIN SODIUM 5000 [USP'U]/ML
5000 INJECTION, SOLUTION INTRAVENOUS; SUBCUTANEOUS EVERY 8 HOURS
Status: DISCONTINUED | OUTPATIENT
Start: 2025-02-24 | End: 2025-02-24 | Stop reason: HOSPADM

## 2025-02-24 RX ORDER — MIDAZOLAM HYDROCHLORIDE 1 MG/ML
.5-4 INJECTION, SOLUTION INTRAMUSCULAR; INTRAVENOUS
Status: DISCONTINUED | OUTPATIENT
Start: 2025-02-24 | End: 2025-02-24 | Stop reason: HOSPADM

## 2025-02-24 RX ORDER — INSULIN ASPART 100 [IU]/ML
0-10 INJECTION, SOLUTION INTRAVENOUS; SUBCUTANEOUS
Status: DISCONTINUED | OUTPATIENT
Start: 2025-02-24 | End: 2025-02-24

## 2025-02-24 RX ORDER — BUSPIRONE HYDROCHLORIDE 10 MG/1
10 TABLET ORAL 2 TIMES DAILY PRN
Status: DISCONTINUED | OUTPATIENT
Start: 2025-02-24 | End: 2025-02-25 | Stop reason: HOSPADM

## 2025-02-24 RX ORDER — PREGABALIN 75 MG/1
150 CAPSULE ORAL
Status: COMPLETED | OUTPATIENT
Start: 2025-02-24 | End: 2025-02-24

## 2025-02-24 RX ORDER — ACETAMINOPHEN 500 MG
1000 TABLET ORAL
Status: COMPLETED | OUTPATIENT
Start: 2025-02-24 | End: 2025-02-24

## 2025-02-24 RX ORDER — ONDANSETRON HYDROCHLORIDE 2 MG/ML
4 INJECTION, SOLUTION INTRAVENOUS EVERY 6 HOURS PRN
Status: DISCONTINUED | OUTPATIENT
Start: 2025-02-24 | End: 2025-02-25 | Stop reason: HOSPADM

## 2025-02-24 RX ORDER — OXYCODONE HYDROCHLORIDE 5 MG/1
5 TABLET ORAL EVERY 4 HOURS PRN
Refills: 0 | Status: DISCONTINUED | OUTPATIENT
Start: 2025-02-24 | End: 2025-02-25 | Stop reason: HOSPADM

## 2025-02-24 RX ORDER — FAMOTIDINE 20 MG/1
20 TABLET, FILM COATED ORAL 2 TIMES DAILY
Status: DISCONTINUED | OUTPATIENT
Start: 2025-02-24 | End: 2025-02-25 | Stop reason: HOSPADM

## 2025-02-24 RX ORDER — TAMSULOSIN HYDROCHLORIDE 0.4 MG/1
0.4 CAPSULE ORAL NIGHTLY
Status: DISCONTINUED | OUTPATIENT
Start: 2025-02-24 | End: 2025-02-25 | Stop reason: HOSPADM

## 2025-02-24 RX ORDER — PREGABALIN 75 MG/1
75 CAPSULE ORAL NIGHTLY
Status: DISCONTINUED | OUTPATIENT
Start: 2025-02-24 | End: 2025-02-25 | Stop reason: HOSPADM

## 2025-02-24 RX ORDER — METOPROLOL TARTRATE 25 MG/1
25 TABLET, FILM COATED ORAL 2 TIMES DAILY
Status: DISCONTINUED | OUTPATIENT
Start: 2025-02-24 | End: 2025-02-25 | Stop reason: HOSPADM

## 2025-02-24 RX ORDER — LOSARTAN POTASSIUM 25 MG/1
25 TABLET ORAL DAILY
Status: DISCONTINUED | OUTPATIENT
Start: 2025-02-24 | End: 2025-02-25 | Stop reason: HOSPADM

## 2025-02-24 RX ORDER — ROCURONIUM BROMIDE 10 MG/ML
INJECTION, SOLUTION INTRAVENOUS
Status: DISCONTINUED | OUTPATIENT
Start: 2025-02-24 | End: 2025-02-24

## 2025-02-24 RX ORDER — IBUPROFEN 200 MG
16 TABLET ORAL
Status: DISCONTINUED | OUTPATIENT
Start: 2025-02-24 | End: 2025-02-24

## 2025-02-24 RX ORDER — HALOPERIDOL 5 MG/ML
0.5 INJECTION INTRAMUSCULAR EVERY 10 MIN PRN
Status: DISCONTINUED | OUTPATIENT
Start: 2025-02-24 | End: 2025-02-24 | Stop reason: HOSPADM

## 2025-02-24 RX ADMIN — DEXAMETHASONE SODIUM PHOSPHATE 4 MG: 4 INJECTION, SOLUTION INTRAMUSCULAR; INTRAVENOUS at 07:02

## 2025-02-24 RX ADMIN — METOPROLOL TARTRATE 25 MG: 25 TABLET, FILM COATED ORAL at 09:02

## 2025-02-24 RX ADMIN — HEPARIN SODIUM 5000 UNITS: 5000 INJECTION INTRAVENOUS; SUBCUTANEOUS at 02:02

## 2025-02-24 RX ADMIN — HYDROMORPHONE HYDROCHLORIDE 0.2 MG: 1 INJECTION, SOLUTION INTRAMUSCULAR; INTRAVENOUS; SUBCUTANEOUS at 11:02

## 2025-02-24 RX ADMIN — MIDAZOLAM 2 MG: 1 INJECTION INTRAMUSCULAR; INTRAVENOUS at 06:02

## 2025-02-24 RX ADMIN — FENTANYL CITRATE 50 MCG: 50 INJECTION INTRAMUSCULAR; INTRAVENOUS at 07:02

## 2025-02-24 RX ADMIN — FENTANYL CITRATE 50 MCG: 50 INJECTION INTRAMUSCULAR; INTRAVENOUS at 06:02

## 2025-02-24 RX ADMIN — SODIUM CHLORIDE: 9 INJECTION, SOLUTION INTRAVENOUS at 06:02

## 2025-02-24 RX ADMIN — HEPARIN SODIUM 5000 UNITS: 5000 INJECTION INTRAVENOUS; SUBCUTANEOUS at 09:02

## 2025-02-24 RX ADMIN — VILAZODONE HYDROCHLORIDE 10 MG: 10 TABLET ORAL at 12:02

## 2025-02-24 RX ADMIN — ROCURONIUM BROMIDE 50 MG: 10 INJECTION, SOLUTION INTRAVENOUS at 07:02

## 2025-02-24 RX ADMIN — SODIUM CHLORIDE: 0.9 INJECTION, SOLUTION INTRAVENOUS at 07:02

## 2025-02-24 RX ADMIN — ACETAMINOPHEN 1000 MG: 500 TABLET ORAL at 06:02

## 2025-02-24 RX ADMIN — OXYCODONE HYDROCHLORIDE 10 MG: 10 TABLET ORAL at 11:02

## 2025-02-24 RX ADMIN — PREGABALIN 150 MG: 75 CAPSULE ORAL at 06:02

## 2025-02-24 RX ADMIN — HALOPERIDOL LACTATE 0.5 MG: 5 INJECTION, SOLUTION INTRAMUSCULAR at 02:02

## 2025-02-24 RX ADMIN — FAMOTIDINE 20 MG: 20 TABLET, FILM COATED ORAL at 09:02

## 2025-02-24 RX ADMIN — SODIUM CHLORIDE: 9 INJECTION, SOLUTION INTRAVENOUS at 11:02

## 2025-02-24 RX ADMIN — SODIUM CHLORIDE, SODIUM GLUCONATE, SODIUM ACETATE, POTASSIUM CHLORIDE, MAGNESIUM CHLORIDE, SODIUM PHOSPHATE, DIBASIC, AND POTASSIUM PHOSPHATE: .53; .5; .37; .037; .03; .012; .00082 INJECTION, SOLUTION INTRAVENOUS at 07:02

## 2025-02-24 RX ADMIN — KETOROLAC TROMETHAMINE 15 MG: 15 INJECTION, SOLUTION INTRAMUSCULAR; INTRAVENOUS at 06:02

## 2025-02-24 RX ADMIN — ROCURONIUM BROMIDE 30 MG: 10 INJECTION, SOLUTION INTRAVENOUS at 09:02

## 2025-02-24 RX ADMIN — SUGAMMADEX 200 MG: 100 INJECTION, SOLUTION INTRAVENOUS at 10:02

## 2025-02-24 RX ADMIN — FENTANYL CITRATE 50 MCG: 50 INJECTION INTRAMUSCULAR; INTRAVENOUS at 10:02

## 2025-02-24 RX ADMIN — BUPIVACAINE HYDROCHLORIDE 15 ML: 7.5 INJECTION, SOLUTION EPIDURAL; RETROBULBAR at 06:02

## 2025-02-24 RX ADMIN — ACETAMINOPHEN 1000 MG: 500 TABLET ORAL at 12:02

## 2025-02-24 RX ADMIN — LIDOCAINE HYDROCHLORIDE 100 MG: 20 INJECTION, SOLUTION EPIDURAL; INFILTRATION; INTRACAUDAL at 07:02

## 2025-02-24 RX ADMIN — HEPARIN SODIUM 5000 UNITS: 5000 INJECTION INTRAVENOUS; SUBCUTANEOUS at 06:02

## 2025-02-24 RX ADMIN — PREGABALIN 75 MG: 75 CAPSULE ORAL at 09:02

## 2025-02-24 RX ADMIN — HYDROMORPHONE HYDROCHLORIDE 0.2 MG: 1 INJECTION, SOLUTION INTRAMUSCULAR; INTRAVENOUS; SUBCUTANEOUS at 12:02

## 2025-02-24 RX ADMIN — ACETAMINOPHEN 1000 MG: 500 TABLET ORAL at 05:02

## 2025-02-24 RX ADMIN — ROCURONIUM BROMIDE 40 MG: 10 INJECTION, SOLUTION INTRAVENOUS at 08:02

## 2025-02-24 RX ADMIN — OXYCODONE HYDROCHLORIDE 10 MG: 10 TABLET ORAL at 05:02

## 2025-02-24 RX ADMIN — TAMSULOSIN HYDROCHLORIDE 0.4 MG: 0.4 CAPSULE ORAL at 09:02

## 2025-02-24 RX ADMIN — CEFAZOLIN 2 G: 330 INJECTION, POWDER, FOR SOLUTION INTRAMUSCULAR; INTRAVENOUS at 07:02

## 2025-02-24 RX ADMIN — PROPOFOL 150 MG: 10 INJECTION, EMULSION INTRAVENOUS at 07:02

## 2025-02-24 RX ADMIN — ONDANSETRON 4 MG: 2 INJECTION INTRAMUSCULAR; INTRAVENOUS at 10:02

## 2025-02-24 RX ADMIN — LOSARTAN POTASSIUM 25 MG: 25 TABLET, FILM COATED ORAL at 12:02

## 2025-02-24 NOTE — ANESTHESIA PROCEDURE NOTES
Intubation    Date/Time: 2/24/2025 7:10 AM    Performed by: Carolyn Malhotra MD  Authorized by: Shane Etienne Jr., MD    Intubation:     Induction:  Intravenous    Intubated:  Postinduction    Mask Ventilation:  Easy mask    Attempts:  1    Attempted By:  Resident anesthesiologist    Method of Intubation:  Direct    Blade:  Bal 3    Laryngeal View Grade: Grade IIb - only the arytenoids and epiglottis seen      Difficult Airway Encountered?: No      Complications:  None    Airway Device:  Oral endotracheal tube    Airway Device Size:  7.5    Style/Cuff Inflation:  Cuffed    Inflation Amount (mL):  8    Tube secured:  25    Secured at:  The lips    Placement Verified By:  Capnometry    Complicating Factors:  None    Findings Post-Intubation:  BS equal bilateral and atraumatic/condition of teeth unchanged

## 2025-02-24 NOTE — PROGRESS NOTES
IRB# 2011.222.A  [facility] Renal Protocol  Date: February 24, 2025    Specimen (left kidney and 2 purple top tubes of blood) was retrieved from OR 24 by Shaahna Phoenix RN.   Specimen was brought to pathology where a slice of normal and tumor was taken per [facility] protocol and brought to Dr. Leavitt's lab along with the blood.    Shahana PEREZ, LIN[place]

## 2025-02-24 NOTE — ANESTHESIA PROCEDURE NOTES
Left FRANSISCO SS    Patient location during procedure: pre-op   Block not for primary anesthetic.  Reason for block: at surgeon's request and post-op pain management   Post-op Pain Location: left abdominal pain   Start time: 2/24/2025 6:37 AM  Timeout: 2/24/2025 6:36 AM   End time: 2/24/2025 6:43 AM    Staffing  Authorizing Provider: Megan Salazar MD  Performing Provider: Leo Hunter MD    Staffing  Performed by: Leo Hunter MD  Authorized by: Megan Salazar MD    Preanesthetic Checklist  Completed: patient identified, IV checked, site marked, risks and benefits discussed, surgical consent, monitors and equipment checked, pre-op evaluation and timeout performed  Peripheral Block  Patient position: sitting  Prep: ChloraPrep  Patient monitoring: heart rate, cardiac monitor, continuous pulse ox, continuous capnometry and frequent blood pressure checks  Block type: erector spinae plane  Laterality: left  Injection technique: single shot  Interspace: T9-10    Needle  Needle type: Stimuplex   Needle gauge: 20 G  Needle length: 4 in  Needle localization: anatomical landmarks and ultrasound guidance   -ultrasound image captured on disc.  Assessment  Injection assessment: negative aspiration, negative parasthesia and local visualized surrounding nerve  Paresthesia pain: none  Heart rate change: no  Slow fractionated injection: yes  Pain Tolerance: no complaints and comfortable throughout block  Medications:    Medications: bupivacaine (pf) (MARCAINE) injection 0.75% - Perineural   15 mL - 2/24/2025 6:43:00 AM    Additional Notes  A time out was conducted. Site meli confirmed with team and patient. Allergies reviewed.   Vital signs stable throughout block. RN monitoring vitals throughout.   Needle advanced under continuous ultrasound guidance.  Local injected incrementally after confirming negative aspiration. No signs or symptoms of intravascular or intraneural injection noted.   No persistent paresthesias elicited  or expressed. Patient tolerated procedure well.  30cc of 0.375% bupi with epinephrine 1:300K, PF dexamethasone 1 mg, and clonidine 50 mcg used for the block.

## 2025-02-24 NOTE — NURSING TRANSFER
Nursing Transfer Note      2/24/2025   3:51 PM    Nurse giving handoff:Betty   Nurse receiving handoff:Jaki     Reason patient is being transferred: post op    Transfer To: 1022    Transfer via bed    Transfer with 02 2 liters NC     Transported by pct x 2     Transfer Vital Signs: See Flowsheet     Order for Tele Monitor? No    Additional Lines: Oxygen and Silva Catheter    Medicines sent: none    Any special needs or follow-up needed: none    Patient belongings transferred with patient: Yes    Chart send with patient: Yes    Notified: spouse    Patient reassessed at: 2/24/2025 1545  1  Upon arrival to floor: patient oriented to room, call bell in reach, and bed in lowest position

## 2025-02-24 NOTE — ANESTHESIA POSTPROCEDURE EVALUATION
Anesthesia Post Evaluation    Patient: Syed Casanova    Procedure(s) Performed: Procedure(s) (LRB):  XI ROBOTIC NEPHRECTOMY, RADICAL (Left)    Final Anesthesia Type: general      Patient location during evaluation: PACU  Patient participation: Yes- Able to Participate  Level of consciousness: awake and alert and oriented  Post-procedure vital signs: reviewed and stable  Pain management: adequate  Airway patency: patent    PONV status at discharge: No PONV  Anesthetic complications: no      Cardiovascular status: stable  Respiratory status: unassisted, spontaneous ventilation and room air  Hydration status: euvolemic  Follow-up not needed.              Vitals Value Taken Time   /84 02/24/25 12:16   Temp 36.3 °C (97.3 °F) 02/24/25 11:00   Pulse 78 02/24/25 12:19   Resp 16 02/24/25 12:19   SpO2 96 % 02/24/25 12:19   Vitals shown include unfiled device data.      Event Time   Out of Recovery 11:30:00         Pain/Tang Score: Pain Rating Prior to Med Admin: 7 (2/24/2025 11:55 AM)  Pain Rating Post Med Admin: 0 (2/24/2025  6:50 AM)  Tang Score: 10 (2/24/2025 11:45 AM)

## 2025-02-24 NOTE — TRANSFER OF CARE
"Anesthesia Transfer of Care Note    Patient: Syed Casanova    Procedure(s) Performed: Procedure(s) (LRB):  XI ROBOTIC NEPHRECTOMY, RADICAL (Left)    Patient location: PACU    Anesthesia Type: general    Transport from OR: Transported from OR on 6-10 L/min O2 by face mask with adequate spontaneous ventilation    Post pain: adequate analgesia    Post assessment: no apparent anesthetic complications    Post vital signs: stable    Level of consciousness: awake and alert    Nausea/Vomiting: no nausea/vomiting    Complications: none    Transfer of care protocol was followed      Last vitals: Visit Vitals  /71 (BP Location: Left arm, Patient Position: Sitting)   Pulse 61   Temp 36.7 °C (98.1 °F) (Temporal)   Resp 13   Ht 5' 7" (1.702 m)   Wt 98.9 kg (218 lb)   SpO2 96%   BMI 34.14 kg/m²     "

## 2025-02-24 NOTE — ANESTHESIA PREPROCEDURE EVALUATION
[facility]  Anesthesia Pre-Operative Evaluation         Patient Name: Syed Casanova  YOB: 1985  MRN: [id number]    SUBJECTIVE:     Pre-operative evaluation for Procedure(s) (LRB):  XI ROBOTIC NEPHRECTOMY possible open (Left)     02/24/2025    Syed Casanova is a 39 y.o. male w/ a significant PMHx of ChF (EF 40%), HTN, SAJAN, GERD, former smoker with L renal mass.    Patient now presents for the above procedure(s).    Cardiac Cath 11/6/24    The pre-procedure left ventricular end diastolic pressure was 8.    The estimated blood loss was <50 mL.    There was non-obstructive coronary artery disease. Minor luminal irregularities only.    Right dominant circulation.      Echo 11/6/24  Left Ventricle The left ventricle is normal in size. Normal wall thickness. Regional wall motion abnormalities present. There is moderately reduced systolic function. Ejection fraction is approximately 40%.   Right Ventricle Normal right ventricular cavity size. Systolic function is normal.   Left Atrium Normal left atrial size.   Right Atrium Normal right atrial size.   Aortic Valve The aortic valve is structurally normal. There is normal leaflet mobility. Aortic valve peak velocity is 1.2 m/s. Mean gradient is 2.9 mmHg. There is no significant regurgitation.   Mitral Valve The mitral valve is structurally normal. There is normal leaflet mobility. There is no significant regurgitation.   Tricuspid Valve The tricuspid valve is structurally normal. There is normal leaflet mobility. There is physiologically normal regurgitation.   Pulmonic Valve The pulmonic valve is structurally normal. There is no significant regurgitation.   IVC/SVC IVC was not  visualized due to poor acoustic window.   Pericardium and Other Findings There is no pericardial effusion.       LDA: None documented.    Prev airway: None documented.    Drips: None documented.    Problem List[1]    Review of patient's allergies indicates:  No Known  Allergies    Current Outpatient Medications:  Current Medications[2]    Past Surgical History:   Procedure Laterality Date    APPENDECTOMY      Around 2015    LEFT HEART CATHETERIZATION Left 11/07/2024    Procedure: Left heart cath;  Surgeon: Lenny Leon MD;  Location: [facility] CATH LAB;  Service: Cardiology;  Laterality: Left;       Social History[3]    OBJECTIVE:     Vital Signs Range (Last 24H):  Temp:  [36.7 °C (98.1 °F)]   Pulse:  [60]   Resp:  [16]   BP: (121)/(78)   SpO2:  [96 %]       Significant Labs:  Lab Results   Component Value Date    WBC 7.71 02/17/2025    HGB 15.9 02/17/2025    HCT 48.5 02/17/2025     02/17/2025    CHOL 117 11/06/2024    TRIG 83 11/06/2024    HDL 35 (L) 11/06/2024    ALT 40 11/11/2024    AST 20 11/11/2024     01/24/2025    K 4.9 01/24/2025     01/24/2025    CREATININE 1.3 01/24/2025    BUN 22 (H) 01/24/2025    CO2 25 01/24/2025    TSH 1.201 11/22/2024    INR 0.91 11/11/2024    HGBA1C 5.1 11/11/2024       Diagnostic Studies: No relevant studies.    EKG:   Results for orders placed or performed in visit on 11/22/24   EKG 12-lead    Collection Time: 11/22/24  9:03 AM   Result Value Ref Range    QRS Duration 92 ms    [facility] QTC Calculation 380 ms    Narrative    Test Reason : R00.2,R07.9,    Vent. Rate :  60 BPM     Atrial Rate :  60 BPM     P-R Int : 164 ms          QRS Dur :  92 ms      QT Int : 380 ms       P-R-T Axes :  -2  82  52 degrees    QTcB Int : 380 ms    Normal sinus rhythm  Normal ECG  When compared with ECG of 11-Nov-2024 17:54,  Nonspecific T wave abnormality no longer evident in Lateral leads  Confirmed by Anselmo Kilgore ([id number]) on 11/24/2024 11:16:11 AM    Referred By:            Confirmed By: Anselmo Kilgore       2D ECHO:  TTE:  Results for orders placed or performed during the hospital encounter of 11/05/24   Echo   Result Value Ref Range    BSA 2.13 m2    LVOT stroke volume 63.8 cm3    LVIDd 4.4 3.5 - 6.0 cm    LV Systolic Volume 40.11 mL    LV  Systolic Volume Index 19.4 mL/m2    LVIDs 3.2 2.1 - 4.0 cm    LV ESV A2C 32.35 mL    LV Diastolic Volume 89.75 mL    LV ESV A4C 22.07 mL    LV Diastolic Volume Index 43.36 mL/m2    Left Ventricular End Systolic Volume by Teichholz Method 40.11 mL    Left Ventricular End Diastolic Volume by Teichholz Method 89.75 mL    IVS 1.0 0.6 - 1.1 cm    LVOT diameter 2.2 cm    LVOT area 3.8 cm2    FS 27.3 (A) 28 - 44 %    Left Ventricle Relative Wall Thickness 0.41 cm    PW 0.9 0.6 - 1.1 cm    LV mass 137.8 g    LV Mass Index 66.6 g/m2    MV Peak E Chris 0.80 m/s    TDI LATERAL 0.12 m/s    TDI SEPTAL 0.09 m/s    E/E' ratio 7.62 m/s    MV Peak A Chris 0.67 m/s    E/A ratio 1.19     E wave deceleration time 205.73 msec    LV SEPTAL E/E' RATIO 8.89 m/s    LV LATERAL E/E' RATIO 6.67 m/s    LVOT peak chris 0.8 m/s    Left Ventricular Outflow Tract Mean Velocity 0.55 cm/s    Left Ventricular Outflow Tract Mean Gradient 1.38 mmHg    RV- villanueva basal diam 2.2 cm    RV-villanueva mid d 2.0 cm    RV Basal Diameter 7.13 cm    RV-villanueva length 7.1 cm    RV mid diameter 2.03 cm    RV/LV Ratio 0.50 cm    LA Vol (MOD) 26.99 mL    LAURA (MOD) 13.0 mL/m2    RA area length vol 20.98 mL    RA Area 11.4 cm2    RA Vol 21.42 mL    AV mean gradient 2.9 mmHg    AV peak gradient 5.8 mmHg    Ao peak chris 1.2 m/s    Ao VTI 24.0 cm    LVOT peak VTI 16.8 cm    AV valve area 2.7 cm²    AV Velocity Ratio 0.67     AV index (prosthetic) 0.70     LACI by Velocity Ratio 2.5 cm²    PV PEAK VELOCITY 0.81 m/s    PV peak gradient 3 mmHg    Ao root annulus 3.68 cm    Mean e' 0.11 m/s    ZLVIDS -1.46     ZLVIDD -3.58     LA area A4C 11.03 cm2    LA area A2C 13.77 cm2    AORTIC VALVE CUSP SEPERATION 2.22 cm    EF 40 %    Narrative      Left Ventricle: The left ventricle is normal in size. Normal wall   thickness. Regional wall motion abnormalities present. There is moderately   reduced systolic function. Ejection fraction is approximately 40%.    Right Ventricle: Normal right ventricular  cavity size. Systolic   function is normal.         HAYLEY:  No results found for this or any previous visit.    ASSESSMENT/PLAN:           Pre-op Assessment    I have reviewed the Patient Summary Reports.     I have reviewed the Nursing Notes. I have reviewed the NPO Status.   I have reviewed the Medications.     Review of Systems  Anesthesia Hx:  No problems with previous Anesthesia   History of prior surgery of interest to airway management or planning:  Previous anesthesia: General 12/11/2024 EGD with general anesthesia.  Procedure performed at an [facility].      Airway issues documented on chart review include GETA     Denies Family Hx of Anesthesia complications.    Denies Personal Hx of Anesthesia complications.                    Social:  Former Smoker, Alcohol Use, Social Alcohol Use       SOCIOECONOMIC STATUS  Employment Status  Full Time  Employer  [employer]    Marital Status    Steven Casanova  Spouse  Emergency Contact  [phone]      Family History   Mother No Known Problems  Father No Known Problems  Maternal Grandfather               Heart attack              Hematology/Oncology:    Oncology Normal    -- Denies Anemia:                                  EENT/Dental:  chronic allergic rhinitis           Cardiovascular:  Exercise tolerance: good   Denies Pacemaker. Hypertension, well controlled       Denies Angina. CHF    no hyperlipidemia YAN       Cardiovascular Symptoms: Angina, Arthur Class III        Coronary Artery Disease:   Coronary Angiogram Findings , left heart catherization was 11/4/2024  RIGHT DOMINANT CIRCULATION         11/7/2024 ANGIOGRAPHY  Summary   ·  The pre-procedure left ventricular end diastolic pressure was 8.  ·  The estimated blood loss was <50 mL.  ·  There was non-obstructive coronary artery disease. Minor luminal irregularities only.  ·  Right dominant circulation.       11/6/2024 ECHO  Summary   ·  Left Ventricle:   The left ventricle is normal  in size.   Normal wall thickness.   Regional wall motion abnormalities present.   There is moderately reduced systolic function.   Ejection fraction is approximately 40%.  · Right Ventricle:   Normal right ventricular cavity size.   Systolic function is normal.     /79 HR 65    Study Details A complete echo was performed using complete 2D, color flow Doppler and spectral Doppler.   During the study, the apical and parasternal views were captured.    Overall the study quality was adequate.   There was no prior echocardiogram study performed.    Echocardiography Findings  Left Ventricle   The left ventricle is normal in size.   Normal wall thickness.   Regional wall motion abnormalities present.  There is moderately reduced systolic function.  Ejection fraction is approximately 40%.  Right Ventricle   Normal right ventricular cavity size.   Systolic function is normal.  Left Atrium   Normal left atrial size.  Right Atrium   Normal right atrial size.  Aortic Valve   The aortic valve is structurally normal.   There is normal leaflet mobility.   Aortic valve peak velocity is 1.2 m/s.   Mean gradient is 2.9 mmHg.   There is no significant regurgitation.  Mitral Valve   The mitral valve is structurally normal.   There is normal leaflet mobility.   There is no significant regurgitation.  Tricuspid Valve   The tricuspid valve is structurally normal.   There is normal leaflet mobility.   There is physiologically normal regurgitation.  Pulmonic Valve   The pulmonic valve is structurally normal. There is no significant regurgitation.  IVC/SVC   IVC was not  visualized due to poor acoustic window.  Pericardium and Other Findings   There is no pericardial effusion.       Congestive Heart Failure (CHF) , Chronic Congestive Heart Failure  , LV Systolic HF                 Other Cardiac Conditions Palpitations  Pulmonary:        Sleep Apnea, CPAP                Renal/:  Chronic Renal Disease   LEFT RENAL MASS  Testosterone  deficiency   Neoplasm/Tumor, Renal Neoplasm, Suspected Renal Cell CA.           Hepatic/GI:     GERD, well controlled Liver Disease,    APPENDECTOMY                      Musculoskeletal:  Arthritis    Musculoskeletal General/Symptoms: joint pain, neck pain.    Joint Disease:  Arthritis, Osteoarthritis 1/24/2023  Arthralgia of shoulder   1/24/2023  Traumatic arthropathy           Neurological:  Denies TIA.  Denies CVA.    Denies Seizures.        Osteoarthritis  Denies Peripheral Neuropathy                          Endocrine:  Denies Diabetes.    Diabetes, Type 2 Diabetes  , Complications include Atherosclerotic CV Disease , controlled by diet, oral hypoglycemics. Typical AM glucose range: 100 , most recent HgA1c value was 5.1 on 11/11/2024.              Obesity / BMI > 30  Dermatological:  Skin Normal    Psych:  Psychiatric History  depression   Problem related to unspecified psychosocial circumstances  Adjustment disorder with mixed anxiety and depressed moo                 Physical Exam  General: Well nourished, Cooperative, Alert and Oriented    Airway:  Mouth Opening: Normal  Tongue: Normal  Neck ROM: Normal ROM    Dental:  Caps / Implants, Intact    Chest/Lungs:  Normal Respiratory Rate    Heart:  Rate: Normal        Anesthesia Plan  Type of Anesthesia, risks & benefits discussed:    Anesthesia Type: Gen ETT  Intra-op Monitoring Plan: Standard ASA Monitors and Art Line  Post Op Pain Control Plan: multimodal analgesia and IV/PO Opioids PRN  Induction:  IV  Airway Plan: Direct, Post-Induction  Informed Consent: Informed consent signed with the Patient and all parties understand the risks and agree with anesthesia plan.  All questions answered.   ASA Score: 3  Day of Surgery Review of History & Physical: H&P Update referred to the surgeon/provider.    Ready For Surgery From Anesthesia Perspective.     .           [1]   Patient Active Problem List  Diagnosis    COVID-19    Generalized body aches    Adjustment  disorder with mixed emotional features    Anxiety    Arthralgia of shoulder    Axis IV diagnosis    Deferred diagnosis on axis I    Deferred diagnosis on axis II    Deferred diagnosis on axis III    Depression    Health examination of defined subpopulation    Low back pain    Neck pain    Traumatic arthropathy    Problem related to unspecified psychosocial circumstances    Adjustment disorder with mixed anxiety and depressed mood    Testosterone deficiency    Arthritis    Gastroesophageal reflux disease    Chest pain    Angina, class III    Heart failure with mid-range ejection fraction    Obstructive sleep apnea    Palpitations    Left renal mass    BMI 34.0-34.9,adult    HTN (hypertension)    Kidney stones    Fatty liver    Lower urinary tract symptoms (LUTS)    Renal impairment   [2]   Current Facility-Administered Medications:     0.9% NaCl infusion, , Intravenous, Continuous, Emilia Armando MD    ceFAZolin 2 g, 2 g, Intravenous, On Call Procedure, Emilia Armando MD    fentaNYL 50 mcg/mL injection  mcg,  mcg, Intravenous, PRN, Edin Lopez MD    heparin (porcine) injection 5,000 Units, 5,000 Units, Subcutaneous, Q8H, Emilia Armando MD, 5,000 Units at 02/24/25 0606    ketorolac injection 15 mg, 15 mg, Intravenous, On Call Procedure, Emilia Armando MD, 15 mg at 02/24/25 0606    LIDOcaine (PF) 10 mg/ml (1%) injection 10 mg, 1 mL, Intradermal, Once, Emilia Armando MD    midazolam injection 0.5-4 mg, 0.5-4 mg, Intravenous, PRN, Edin Lopez MD  [3]   Social History  Socioeconomic History    Marital status:    Tobacco Use    Smoking status: Former     Types: Cigarettes     Passive exposure: Never    Smokeless tobacco: Former    Tobacco comments:     Stopped Nov 2024   Substance and Sexual Activity    Alcohol use: Yes     Comment: Once a month    Drug use: Never    Sexual activity: Yes     Social Drivers of Health     Financial Resource Strain: Low Risk  (2/16/2025)    Overall Financial Resource Strain (CARDIA)      Difficulty of Paying Living Expenses: Not hard at all   Food Insecurity: No Food Insecurity (2/16/2025)    Hunger Vital Sign     Worried About Running Out of Food in the Last Year: Never true     Ran Out of Food in the Last Year: Never true   Transportation Needs: No Transportation Needs (2/16/2025)    PRAPARE - Transportation     Lack of Transportation (Medical): No     Lack of Transportation (Non-Medical): No   Physical Activity: Inactive (2/16/2025)    Exercise Vital Sign     Days of Exercise per Week: 0 days     Minutes of Exercise per Session: 0 min   Stress: No Stress Concern Present (2/16/2025)    [employer] - Occupational Stress Questionnaire     Feeling of Stress : Only a little   Housing Stability: Low Risk  (2/16/2025)    Housing Stability Vital Sign     Unable to Pay for Housing in the Last Year: No     Number of Times Moved in the Last Year: 0     Homeless in the Last Year: No

## 2025-02-24 NOTE — BRIEF OP NOTE
[facility]  Operative Note    Date: 02/24/2025    Pre-Op Diagnosis: left renal mass    Post-Op Diagnosis: same    Procedure(s) Performed:   Robotic left radical nephrectomy    Specimen(s): left kidney    Staff Surgeon: FLORES Loco MD    Assistant Surgeon: JOHANNA Sotelo MD    Anesthesia: General endotracheal anesthesia    Indications: Syed Casanova is a 39 y.o. male with a left lower pole renal mass that extends into the hilum.    Findings: above    Estimated Blood Loss: 50 ccs    Drains: none    Wound Class: 2

## 2025-02-25 VITALS
SYSTOLIC BLOOD PRESSURE: 134 MMHG | HEIGHT: 67 IN | RESPIRATION RATE: 18 BRPM | WEIGHT: 218 LBS | TEMPERATURE: 98 F | BODY MASS INDEX: 34.21 KG/M2 | HEART RATE: 80 BPM | OXYGEN SATURATION: 97 % | DIASTOLIC BLOOD PRESSURE: 84 MMHG

## 2025-02-25 LAB
ANION GAP SERPL CALC-SCNC: 9 MMOL/L (ref 8–16)
BASOPHILS # BLD AUTO: 0.03 K/UL (ref 0–0.2)
BASOPHILS NFR BLD: 0.3 % (ref 0–1.9)
BUN SERPL-MCNC: 26 MG/DL (ref 6–20)
CALCIUM SERPL-MCNC: 8.2 MG/DL (ref 8.7–10.5)
CHLORIDE SERPL-SCNC: 109 MMOL/L (ref 95–110)
CO2 SERPL-SCNC: 19 MMOL/L (ref 23–29)
CREAT SERPL-MCNC: 1.9 MG/DL (ref 0.5–1.4)
DIFFERENTIAL METHOD BLD: ABNORMAL
EOSINOPHIL # BLD AUTO: 0 K/UL (ref 0–0.5)
EOSINOPHIL NFR BLD: 0.1 % (ref 0–8)
ERYTHROCYTE [DISTWIDTH] IN BLOOD BY AUTOMATED COUNT: 12.1 % (ref 11.5–14.5)
EST. GFR  (NO RACE VARIABLE): 44.9 ML/MIN/1.73 M^2
GLUCOSE SERPL-MCNC: 95 MG/DL (ref 70–110)
HCT VFR BLD AUTO: 40.2 % (ref 40–54)
HGB BLD-MCNC: 13 G/DL (ref 14–18)
IMM GRANULOCYTES # BLD AUTO: 0.04 K/UL (ref 0–0.04)
IMM GRANULOCYTES NFR BLD AUTO: 0.4 % (ref 0–0.5)
LYMPHOCYTES # BLD AUTO: 2.2 K/UL (ref 1–4.8)
LYMPHOCYTES NFR BLD: 19.4 % (ref 18–48)
MAGNESIUM SERPL-MCNC: 2 MG/DL (ref 1.6–2.6)
MCH RBC QN AUTO: 29.5 PG (ref 27–31)
MCHC RBC AUTO-ENTMCNC: 32.3 G/DL (ref 32–36)
MCV RBC AUTO: 91 FL (ref 82–98)
MONOCYTES # BLD AUTO: 1 K/UL (ref 0.3–1)
MONOCYTES NFR BLD: 9.3 % (ref 4–15)
NEUTROPHILS # BLD AUTO: 7.9 K/UL (ref 1.8–7.7)
NEUTROPHILS NFR BLD: 70.5 % (ref 38–73)
NRBC BLD-RTO: 0 /100 WBC
PHOSPHATE SERPL-MCNC: 4.1 MG/DL (ref 2.7–4.5)
PLATELET # BLD AUTO: 227 K/UL (ref 150–450)
PMV BLD AUTO: 10.5 FL (ref 9.2–12.9)
POTASSIUM SERPL-SCNC: 4.1 MMOL/L (ref 3.5–5.1)
RBC # BLD AUTO: 4.41 M/UL (ref 4.6–6.2)
SODIUM SERPL-SCNC: 137 MMOL/L (ref 136–145)
WBC # BLD AUTO: 11.21 K/UL (ref 3.9–12.7)

## 2025-02-25 PROCEDURE — 85025 COMPLETE CBC W/AUTO DIFF WBC: CPT | Performed by: STUDENT IN AN ORGANIZED HEALTH CARE EDUCATION/TRAINING PROGRAM

## 2025-02-25 PROCEDURE — 63600175 PHARM REV CODE 636 W HCPCS: Performed by: STUDENT IN AN ORGANIZED HEALTH CARE EDUCATION/TRAINING PROGRAM

## 2025-02-25 PROCEDURE — 84100 ASSAY OF PHOSPHORUS: CPT | Performed by: STUDENT IN AN ORGANIZED HEALTH CARE EDUCATION/TRAINING PROGRAM

## 2025-02-25 PROCEDURE — 83735 ASSAY OF MAGNESIUM: CPT | Performed by: STUDENT IN AN ORGANIZED HEALTH CARE EDUCATION/TRAINING PROGRAM

## 2025-02-25 PROCEDURE — 36415 COLL VENOUS BLD VENIPUNCTURE: CPT | Performed by: STUDENT IN AN ORGANIZED HEALTH CARE EDUCATION/TRAINING PROGRAM

## 2025-02-25 PROCEDURE — 25000003 PHARM REV CODE 250: Performed by: STUDENT IN AN ORGANIZED HEALTH CARE EDUCATION/TRAINING PROGRAM

## 2025-02-25 PROCEDURE — 80048 BASIC METABOLIC PNL TOTAL CA: CPT | Performed by: STUDENT IN AN ORGANIZED HEALTH CARE EDUCATION/TRAINING PROGRAM

## 2025-02-25 RX ORDER — DEXTROMETHORPHAN HYDROBROMIDE, GUAIFENESIN 5; 100 MG/5ML; MG/5ML
650 LIQUID ORAL EVERY 8 HOURS
Qty: 30 TABLET | Refills: 1 | Status: SHIPPED | OUTPATIENT
Start: 2025-02-25

## 2025-02-25 RX ORDER — POLYETHYLENE GLYCOL 3350 17 G/17G
17 POWDER, FOR SOLUTION ORAL DAILY
Qty: 238 G | Refills: 0 | Status: SHIPPED | OUTPATIENT
Start: 2025-02-25 | End: 2025-03-11

## 2025-02-25 RX ORDER — OXYCODONE HYDROCHLORIDE 5 MG/1
5 TABLET ORAL EVERY 4 HOURS PRN
Qty: 10 TABLET | Refills: 0 | Status: SHIPPED | OUTPATIENT
Start: 2025-02-25

## 2025-02-25 RX ADMIN — METOPROLOL TARTRATE 25 MG: 25 TABLET, FILM COATED ORAL at 08:02

## 2025-02-25 RX ADMIN — LOSARTAN POTASSIUM 25 MG: 25 TABLET, FILM COATED ORAL at 08:02

## 2025-02-25 RX ADMIN — ACETAMINOPHEN 1000 MG: 500 TABLET ORAL at 05:02

## 2025-02-25 RX ADMIN — HEPARIN SODIUM 5000 UNITS: 5000 INJECTION INTRAVENOUS; SUBCUTANEOUS at 05:02

## 2025-02-25 RX ADMIN — FAMOTIDINE 20 MG: 20 TABLET, FILM COATED ORAL at 08:02

## 2025-02-25 NOTE — SUBJECTIVE & OBJECTIVE
Interval History: NAOE, doing well this am, light nausea but no vomiting tolerating po, has walked, pain well controlled, aaron with cyu removed on rounds this am will f/u vt, will plan for dc later today      Objective:     Temp:  [97.3 °F (36.3 °C)-98.6 °F (37 °C)] 97.7 °F (36.5 °C)  Pulse:  [61-95] 70  Resp:  [9-24] 12  SpO2:  [91 %-100 %] 96 %  BP: (105-145)/(56-94) 111/56     Body mass index is 34.14 kg/m².           Drains       Drain  Duration                  Urethral Catheter 02/24/25 0717 Non-latex;Straight-tip 16 Fr. <1 day                     Physical Exam  Constitutional:       General: He is not in acute distress.  HENT:      Head: Normocephalic and atraumatic.      Nose: Nose normal.   Eyes:      Conjunctiva/sclera: Conjunctivae normal.   Cardiovascular:      Rate and Rhythm: Normal rate.   Pulmonary:      Effort: Pulmonary effort is normal. No respiratory distress.   Abdominal:      Comments: Robotic incisions covered in dermabond CDI  Abdomen soft, appropriately tender to palpation   Musculoskeletal:         General: No deformity.   Skin:     General: Skin is warm and dry.   Neurological:      General: No focal deficit present.      Mental Status: He is alert.   Psychiatric:         Mood and Affect: Mood normal.         Behavior: Behavior normal.           Significant Labs:    BMP:  Recent Labs   Lab 02/25/25 0418      K 4.1      CO2 19*   BUN 26*   CREATININE 1.9*   CALCIUM 8.2*       CBC:   Recent Labs   Lab 02/25/25 0418   WBC 11.21   HGB 13.0*   HCT 40.2          All pertinent labs results from the past 24 hours have been reviewed.    Significant Imaging:  All pertinent imaging results/findings from the past 24 hours have been reviewed.

## 2025-02-25 NOTE — PROGRESS NOTES
[facility]  Urology  Progress Note    Patient Name: Syed Casanova  MRN: [id number]  Admission Date: 2/24/2025  Hospital Length of Stay: 1 days  Code Status: Full Code   Attending Provider: Peter Loco MD   Primary Care Physician: Anselmo Cornell MD    Subjective:     HPI:  No notes on file    Interval History: NAOE, doing well this am, light nausea but no vomiting tolerating po, has walked, pain well controlled, aaron with cyu removed on rounds this am will f/u vt, will plan for dc later today      Objective:     Temp:  [97.3 °F (36.3 °C)-98.6 °F (37 °C)] 97.7 °F (36.5 °C)  Pulse:  [61-95] 70  Resp:  [9-24] 12  SpO2:  [91 %-100 %] 96 %  BP: (105-145)/(56-94) 111/56     Body mass index is 34.14 kg/m².           Drains       Drain  Duration                  Urethral Catheter 02/24/25 0717 Non-latex;Straight-tip 16 Fr. <1 day                     Physical Exam  Constitutional:       General: He is not in acute distress.  HENT:      Head: Normocephalic and atraumatic.      Nose: Nose normal.   Eyes:      Conjunctiva/sclera: Conjunctivae normal.   Cardiovascular:      Rate and Rhythm: Normal rate.   Pulmonary:      Effort: Pulmonary effort is normal. No respiratory distress.   Abdominal:      Comments: Robotic incisions covered in dermabond CDI  Abdomen soft, appropriately tender to palpation   Musculoskeletal:         General: No deformity.   Skin:     General: Skin is warm and dry.   Neurological:      General: No focal deficit present.      Mental Status: He is alert.   Psychiatric:         Mood and Affect: Mood normal.         Behavior: Behavior normal.           Significant Labs:    BMP:  Recent Labs   Lab 02/25/25  0418      K 4.1      CO2 19*   BUN 26*   CREATININE 1.9*   CALCIUM 8.2*       CBC:   Recent Labs   Lab 02/25/25  0418   WBC 11.21   HGB 13.0*   HCT 40.2          All pertinent labs results from the past 24 hours have been reviewed.    Significant Imaging:  All  pertinent imaging results/findings from the past 24 hours have been reviewed.                  Assessment/Plan:     Left renal mass  Syed Casanova is an 39 y.o. male that is s/p left robotic RNx on 2/24/25. Doing well post-op.    - Pain - mmpc  - Diet - reg  - Nausea control w/ PRN antiemetics   - OOB  - Encourage IS  - DVT ppx:sqh   - Voiding Trial today  - labs acceptable    - Dispo: plan for dc today           VTE Risk Mitigation (From admission, onward)           Ordered     heparin (porcine) injection 5,000 Units  Every 8 hours         02/24/25 1052     IP VTE HIGH RISK PATIENT  Once         02/24/25 6688                    Margarito Brown MD  Urology  [facility]

## 2025-02-25 NOTE — DISCHARGE SUMMARY
[facility]  Urology  Discharge Summary      Patient Name: Syed Casanova  MRN: [id number]  Admission Date: 2/24/2025  Hospital Length of Stay: 1 days  Discharge Date and Time: No discharge date for patient encounter.  Attending Physician: Peter Loco MD   Discharging Provider: Margarito Brown MD  Primary Care Physician: Anselmo Cornell MD    HPI:   39yoM s/p RA-LRNx on 2/24/25 for a left renal mass.     Procedure(s) (LRB):  XI ROBOTIC NEPHRECTOMY, RADICAL (Left)     Indwelling Lines/Drains at time of discharge:   Lines/Drains/Airways       Drain  Duration                  Urethral Catheter 02/24/25 0717 Non-latex;Straight-tip 16 Fr. <1 day                    Hospital Course The patient was admitted to [facility] [place] the above procedure. Patient tolerated the procedure well in its entirety without issue. For more details, please refer to the complete operative note. he was transferred to recovery post-op and then to the floor. Once on the floor his diet was advanced and he ambulated the night of and day after surgery. On POD 1 the patient was tolerating a regular diet, ambulating without difficulty.his pain was well controlled.    Physical exam was appropriate for post operative state. The incisions were clean, dry and intact. The aaron was draining clear yellow urine. The aaron was removed and he was able to void without difficulty.  The patient was deemed stable for discharge on 02/25/2025.  .     Goals of Care Treatment Preferences:  Code Status: Full Code      Consults:     Significant Diagnostic Studies:     Pending Diagnostic Studies:       Procedure Component Value Units Date/Time    Specimen to Pathology, Surgery Urology [[id number]] Collected: 02/24/25 1020    Order Status: Sent Lab Status: In process Updated: 02/24/25 1101    Specimen: Tissue             Final Active Diagnoses:    Diagnosis Date Noted POA    PRINCIPAL PROBLEM:  Left renal mass [N28.89] 01/13/2025 Yes      Problems Resolved During  this Admission:         Discharged Condition: good    Disposition: Home or Self Care    Follow Up: in clinic    Patient Instructions:      CBC Without Differential   Standing Status: Future Number of Occurrences: 1 Standing Exp. Date: 03/30/26     Order Specific Question Answer Comments   Send normal result to authorizing provider's In Basket if patient is active on MyChart: Yes      Type & Screen   Standing Status: Future Number of Occurrences: 1 Standing Exp. Date: 03/30/26     Medications:  Reconciled Home Medications:      Medication List        START taking these medications      acetaminophen 650 MG Tbsr  Commonly known as: TYLENOL  Take 1 tablet (650 mg total) by mouth every 8 (eight) hours.     oxyCODONE 5 MG immediate release tablet  Commonly known as: ROXICODONE  Take 1 tablet (5 mg total) by mouth every 4 (four) hours as needed for Pain.     polyethylene glycol 17 gram Pwpk  Commonly known as: GLYCOLAX  Take 17 g by mouth once daily. for 14 days            CONTINUE taking these medications      aspirin 81 MG EC tablet  Commonly known as: ECOTRIN  Take 81 mg by mouth once daily.     busPIRone 10 MG tablet  Commonly known as: BUSPAR  Take 1 tablet (10 mg total) by mouth 2 (two) times daily.     empagliflozin 10 mg tablet  Commonly known as: JARDIANCE  Take 1 tablet (10 mg total) by mouth once daily.     losartan 25 MG tablet  Commonly known as: COZAAR  Take 1 tablet (25 mg total) by mouth once daily.     magnesium oxide 400 mg magnesium Tab  Take 1 tablet by mouth once daily.     metoprolol tartrate 25 MG tablet  Commonly known as: LOPRESSOR  Take 25 mg by mouth 2 (two) times daily.     multivitamin per tablet  Commonly known as: THERAGRAN  Take 1 tablet by mouth once daily.     pantoprazole 40 MG tablet  Commonly known as: PROTONIX  Take 1 tablet (40 mg total) by mouth once daily.     VIIBRYD 10 mg Tab tablet  Generic drug: vilazodone  Take 10 mg by mouth once daily.              Time spent on the  discharge of patient: 20 minutes    Margarito Bronw MD  Urology  [facility]

## 2025-02-25 NOTE — NURSING
Pt voided successfully post cath removal. Education pt on d/c instructions. All questions answered. PIV removed.

## 2025-02-25 NOTE — DISCHARGE INSTRUCTIONS
What to expect with your Nephrectomy.  [facility]  Updated 06/10/2011  After surgery  You may or may not have a drain that is shaped like a grenade and put to suction  This drain usually may or may not come out on Post op day 1. If you go home with a drain, the nurses will teach you how to record the output and you will come back 3-5 days after you leave to have the drain removed in clinic.  You will have a catheter after your surgery. It should come out the next day and you should be able to void on your own before you leave. If you are a male and on a BPH medicine, we will need to make sure you restart that the night of your surgery.  The night of surgery we expect and hope that you will:  Walk - walking helps get the bowels moving. Also after your surgery, you are at a risk for a deep venous thrombosis (which is a clot in the legs that can form by remaining inactive or still for extended periods of time) and this can travel to your lungs and make you feel short of breath. This is a very serious condition. Walking helps prevent a DVT from occurring.  Eat - you do not have to eat a whole meal, but we want to make sure you can tolerate liquid and/or solid food without nausea and vomiting  Use your incentive spirometer - this is the breathing apparatus that helps you expand your lungs. If and when you have pain you will not want to take deep breaths. But if you dont take deep breaths, you are at risk for pneumonia. The incentive spirometer will help prevent this from occurring by expanding your lungs.  Symptoms you may experience immediately post-op:  Bloating and/or shoulder pain if you had a laparoscopic procedure - when we do this operation, we fill up your abdominal cavity with gas to better help us visualize the organs and allow our instruments to fit. After the surgery, not all the air can be removed and your body will eventually absorb this small amount of air. However this can make you feel bloated. In  addition, when you sit up, the air can sit right under a muscle (the diaphragm) which has connecting nerves to the shoulders, which could explain why you have shoulder pain.  Do not expect necessarily to have gas or to have a bowel movement - this goes along with the bloating, you may feel like you want to pass gas or have a bowel movement but you cant. This is normal and you will feel like this for a couple days. There are no pills to help with this. Small walks throughout the day should help with this.  Pain - your pain should be able to be controlled with medicines by mouth that we prescribe. It is important for you to tell us if you are on any pain medications at home before the surgery as you may need stronger pain meds while in the hospital.  You can go home when:  Pain is controlled with medicines by mouth  You are able to walk without difficulty or pain  You are tolerating a regulating diet  Your are voiding. If you cannot void we may have to replace a catheter and you will follow up 3-5 days after you leave to have a voiding trial. The nurses will teach you how to care for your catheter.  When you go home:  Activity  Continue to walk - small walks throughout the day are better than one long walk.   Do not lift anything greater than 8 pounds for 6 weeks - we want your abdominal wall muscles to heal.  Bowel Movements - Do not strain to have a bowel movement - the pain medicines will make you constipated. That is why we also ask you to take colace 2-3 x per day to help keep your bowels regular. If you are still having trouble, then you can also add Miralax once a day. Do not take any stool softeners if you are having diarrhea.  Drain - If you have a drain (not your catheter, but a separate drain) then record the output and bring it with you to your next appointment  Smoking - If you smoke, we encourage you STOP. Smoking interferes with the healing process and your prolong your healing with continued  smoking.  Driving - Do not drive while you are on pain meds or with your catheter in place.  Bathing - If you do not have a drain, you can shower 48 hours after your surgery. If you do have a drain, sponge bathe only until the drain is out.  Dressing - you can remove the dressings if there is no drainage or change them as needed if there is. The little sterile band-aid strips will fall off on their own in 10-14 days. If they have not fallen off then you can remove them yourself.  Restarting medicines -especially blood thinners (Aspirin, Plavix, Coumadin), Fish Oil. Discuss this with your physician prior to discharge.  When to return to the ER  Fever - If you have a fever >101.5, this could be due to a number of reasons such as infection of the urine or incision. If your catheter has been removed, you could possibly have a leak. It would be best to come to the ER so they can better evaluate you.  Severe pain - pain is expected, but severe or new onset of pain is not normal.   Inability to tolerate food or liquid with nausea and vomiting - it would be best to go to the ER for them to better evaluate you.

## 2025-02-25 NOTE — NURSING
"[facility] Plan of Care Note    Dx:   Left renal mass [N28.89]  Renal mass [N28.89]    Shift Events: N/A    Goals of Care: Progressing towards goals    Neuro: AAOx4    Vital Signs: BP (!) 111/56 (BP Location: Right arm, Patient Position: Lying)   Pulse 70   Temp 97.7 °F (36.5 °C) (Oral)   Resp 12   Ht 5' 7" (1.702 m)   Wt 98.9 kg (218 lb)   SpO2 96%   BMI 34.14 kg/m²     Respiratory: RA, Home CPAP @ night     Diet: Diet Adult Regular      Is patient tolerating current diet? Yes    GTTS: N/A    Urine Output/Bowel Movement:   I/O this shift:  In: -   Out: 750 [Urine:750]  Last Bowel Movement: 02/23/25      Drains/Tubes/Tube Feeds (include total output/shift):   I/O this shift:  In: -   Out: 750 [Urine:750]      Lines: PIV x 2      Accuchecks:N/A    Skin: Lap sites x3, Vertical incision    Fall Risk Score: 6    Activity level? Independent     Any scheduled procedures? N/A    Any safety concerns? N/A    Other: N/A    "

## 2025-02-25 NOTE — ASSESSMENT & PLAN NOTE
Syed Casanova is an 39 y.o. male that is s/p left robotic RNx on 2/24/25. Doing well post-op.    - Pain - mmpc  - Diet - reg  - Nausea control w/ PRN antiemetics   - OOB  - Encourage IS  - DVT ppx:sqh   - Voiding Trial today  - labs acceptable    - Dispo: plan for dc today

## 2025-02-25 NOTE — PLAN OF CARE
[facility]  Discharge Final Note    Primary Care Provider: Anselmo Cornell MD  Expected Discharge Date: 2/25/2025    Patient medically ready for discharge to home.     Transportation by family.     Is family/patient aware of discharge: yes     Hospital follow up scheduled: yes       Final Discharge Note (most recent)       Final Note - 02/25/25 1545          Final Note    Assessment Type Final Discharge Note     Anticipated Discharge Disposition Home or Self Care     Hospital Resources/Appts/Education Provided Provided patient/caregiver with written discharge plan information                     Important Message from Medicare         Referral Info (most recent)       Referral Info    No documentation.                   Future Appointments   Date Time Provider Department Center   3/18/2025  1:30 PM Peter Loco MD [facility] UROLOG Roel Goldman   7/30/2025  9:40 AM Anselmo Kilgore DO [facility]     Jarad El RN  Case Management  Ext: [phone]  02/25/2025

## 2025-02-25 NOTE — OP NOTE
[facility]  Operative Note     Date: 02/24/2025      Pre-Op Diagnosis: left renal mass suspicious for renal cell carcinoma    Active Problem List with Overview Notes    Diagnosis Date Noted    BMI 34.0-34.9,adult 02/17/2025    HTN (hypertension) 02/17/2025    Kidney stones 02/17/2025    Fatty liver 02/17/2025    Lower urinary tract symptoms (LUTS) 02/17/2025    Renal impairment 02/17/2025    Left renal mass 01/13/2025    Angina, class III 11/06/2024    Heart failure with mid-range ejection fraction 11/06/2024    Obstructive sleep apnea 11/06/2024    Palpitations 11/06/2024    Chest pain 11/05/2024    Problem related to unspecified psychosocial circumstances 07/22/2024    Adjustment disorder with mixed anxiety and depressed mood 07/22/2024    Testosterone deficiency 07/22/2024    Arthritis 07/22/2024    Gastroesophageal reflux disease 07/22/2024    Adjustment disorder with mixed emotional features 01/24/2023    Anxiety 01/24/2023    Arthralgia of shoulder 01/24/2023    Axis IV diagnosis 01/24/2023    Deferred diagnosis on axis I 01/24/2023    Deferred diagnosis on axis II 01/24/2023    Deferred diagnosis on axis III 01/24/2023    Depression 01/24/2023    Health examination of defined subpopulation 01/24/2023    Low back pain 01/24/2023    Neck pain 01/24/2023    Traumatic arthropathy 01/24/2023    COVID-19 10/25/2021    Generalized body aches 10/25/2021        Post-Op Diagnosis: same     Procedure(s) Performed:   1.  Left robotic radical nephrectomy     Specimen(s):   - Left kidney, including Gerota's fascia     Surgeon: Peter Loco MD     Bedside assistant: HECTOR Banks     Assistant: Ciarra Sotelo MD     Anesthesia: General endotracheal anesthesia     Indications: Syed Casanova is a 39 y.o. male  with left renal mass suspicious for renal cell carcinoma.  After discussion of management options and risks and benefits associated with each the patient has elected to pursue  surgical management.       Findings:   - Robotic left radical nephrectomy performed without immediate intra-operative complication.      EBL: 50 mL     Drains:   1.  16 Fr aaron catheter     Anesthesia: General endotracheal anesthesia     Complications:  none     Procedure in Detail: After discussion of risks and benefits of the procedure, informed consent was obtained.  The patient was brought to the operating room and placed supine on the operating table.  SCDs were applied and working prior to induction of anesthesia.  General anesthesia was administered. A 16 Fr Aaron catheter was placed in the standard fashion with 10 ml sterile water used to inflate the balloon. An OG tube was placed. The patient was then moved into the modified flank position with the left side up. The patient was appropriately padded and secured to the table. The patient was then prepped and draped in the usual sterile fashion. Timeout was performed and preoperative antibiotics were confirmed.       A Veress needle was introduced into the abdomen. Entry into the peritoneal cavity was confirmed with the drop test and an initial insufflation pressure of <10mmHg. Aspiration during our drop test revealed no blood or succus. The peritoneal cavity was insufflated up to 15 mmHg and the Veress was removed. The location of the 8 mm camera port was marked with a marking pen and incised sharply using a 15 blade. The 8 mm port was then introduced.  The camera was passed through the port and the abdomen was inspected and found to be free of bowel or vascular injury. Using direct vision the other 2 robotic ports were placed, just below the costal margin and lower on the left abdomen, ensuring at least 8 cm between ports to allow robotic mobility. A 12 mm assistant port was placed in the midline. Each trocar was introduced under direct vision ensuring no injury to the underlying abdominal contents.       Dissection began along the white line of [place],  reflecting the colon medially away from the kidney. The splenic reflection was released. The psoas muscle was identified. Once the colon was reflected, dissection was carried out just below the kidney, and the ureter was identified.  The ureter was elevated and we continued our dissection toward the lower pole of the kidney proximally until we identified the renal hilum.      Careful dissection of the hilum was performed.  The renal vein was easily identified anteriorly.  There was 1 renal vein and 2 renal arteries. The vessels were then skeletonized adequately. The artery was taken with a vascular staple load. A second load was used to divide the renal vein. Hemostasis was excellent.      The kidney was then freed from the remaining superior, posterior and lateral attachments. The ureter was divided. Hemostasis was again assessed and was excellent. The mass was placed into an EndoCatch bag via the 12mm midline assistant port. The robot was undocked and all trocars were removed. The 12 mm midline incision was extended from skin down to fascia to allow removal of the specimen. This was inspected and found to be intact. This was passed off the field for pathologic analysis.       The extraction site fascia was closed using interrupted 1-0 PDS in a running fashion.  All skin incisions were closed using 4-0 monocryl. Dermabond was applied to all of the incisions.     The patient tolerated the procedure well and was transferred to the recovery room in stable condition.     Disposition: The patient will remain on the urology service overnight for observation.       Ciarra Sotelo MD

## 2025-02-26 ENCOUNTER — PATIENT MESSAGE (OUTPATIENT)
Dept: UROLOGY | Facility: CLINIC | Age: 40
End: 2025-02-26
Payer: COMMERCIAL

## 2025-02-26 ENCOUNTER — TELEPHONE (OUTPATIENT)
Dept: UROLOGY | Facility: CLINIC | Age: 40
End: 2025-02-26
Payer: COMMERCIAL

## 2025-02-27 LAB
FINAL PATHOLOGIC DIAGNOSIS: NORMAL
GROSS: NORMAL
Lab: NORMAL

## 2025-02-28 LAB
BLD PROD TYP BPU: NORMAL
BLD PROD TYP BPU: NORMAL
BLOOD UNIT EXPIRATION DATE: NORMAL
BLOOD UNIT EXPIRATION DATE: NORMAL
BLOOD UNIT TYPE CODE: 8400
BLOOD UNIT TYPE CODE: 8400
BLOOD UNIT TYPE: NORMAL
BLOOD UNIT TYPE: NORMAL
CODING SYSTEM: NORMAL
CODING SYSTEM: NORMAL
CROSSMATCH INTERPRETATION: NORMAL
CROSSMATCH INTERPRETATION: NORMAL
DISPENSE STATUS: NORMAL
DISPENSE STATUS: NORMAL
TRANS ERYTHROCYTES VOL PATIENT: NORMAL ML
TRANS ERYTHROCYTES VOL PATIENT: NORMAL ML

## 2025-03-17 PROBLEM — C64.2 CANCER OF KIDNEY PARENCHYMA, LEFT: Status: ACTIVE | Noted: 2025-03-17

## 2025-03-17 PROBLEM — N28.89 LEFT RENAL MASS: Status: RESOLVED | Noted: 2025-01-13 | Resolved: 2025-03-17

## 2025-03-17 NOTE — PROGRESS NOTES
Clinic Note  3/17/2025      Subjective:         Chief Complaint:   HPI  Syed Casanova is a 39 y.o. male noted to have LLP renal mass.  CT abd W WO 12/6/2024- 4.5 cm endophytic, enhancing LLP solid mass. No nodes, thrombus. Single artery single vein. Mass extends into hilar fat. No macroscopic fat or central scar.  RENAL score-10x (22% MCR)  Co-morbidities- CHF, MI, obesity, anxiety disorder, SAJAN. Has not had sleep study yet. . Stopped smoking 2 months.Steven is EMS.  November CXR- no metastasis.     1/24/2025-F/U visit after staging  CT chest- no metastasis  MRI abd W WO-4.6 x 4.1 x 4.2 cm, no thrombus or nodes  Creatinine-1.3 (eGFR >60).    3/18/2025-s/p robotic left nephrectomy (2/24/2025), ccRCC (clear cell renal cell carcinoma), Elisa grade 3, [id number].  Doing great, good appetite  Past Medical History:   Diagnosis Date    Cancer     kidney    GERD (gastroesophageal reflux disease)     Hypertension     Sleep apnea      Family History   Problem Relation Name Age of Onset    No Known Problems Mother      No Known Problems Father      Breast cancer Maternal Grandmother      Heart attack Maternal Grandfather       Social History[1]  Past Surgical History:   Procedure Laterality Date    APPENDECTOMY      Around 2015    LAPAROSCOPIC ROBOT-ASSISTED SURGICAL REMOVAL OF KIDNEY USING DA KEY XI Left 2/24/2025    Procedure: XI ROBOTIC NEPHRECTOMY, RADICAL;  Surgeon: Peter Loco MD;  Location: [facility] OR [address] [place] [address];  Service: Urology;  Laterality: Left;  type/screen, surgery clearance (Ravipati),opiod sparing anesthesia protocal, FRANSISCO BLOCK with catheter    LEFT HEART CATHETERIZATION Left 11/07/2024    Procedure: Left heart cath;  Surgeon: Lenny Leon MD;  Location: [facility] CATH LAB;  Service: Cardiology;  Laterality: Left;     Problem List[2]  Review of Systems   Constitutional:  Negative for appetite change, chills, fatigue, fever and unexpected weight change.   HENT:  Negative for  "nosebleeds.    Respiratory:  Negative for shortness of breath and wheezing.    Cardiovascular:  Negative for chest pain, palpitations and leg swelling.   Gastrointestinal:  Negative for abdominal distention, abdominal pain, constipation, diarrhea, nausea and vomiting.   Genitourinary:  Negative for dysuria and hematuria.   Musculoskeletal:  Negative for arthralgias and back pain.   Skin:  Negative for pallor.   Neurological:  Negative for dizziness, seizures and syncope.   Hematological:  Negative for adenopathy.   Psychiatric/Behavioral:  Negative for dysphoric mood.        Objective:      There were no vitals taken for this visit.  Estimated body mass index is 34.14 kg/m² as calculated from the following:    Height as of 2/24/25: 5' 7" (1.702 m).    Weight as of 2/24/25: 98.9 kg (218 lb).  Physical Exam  Abdominal:      Comments: Incisions well healed and w/o infection.       Assessment and Plan:   Reviewed NCCN F/U guidelines:  Stage I  Follow-up After a Partial or Radical Nephrectomy1   H&P annually   Laboratory tests annually, as clinically indicated   Abdominal imaging:  Baseline abdominal CT or MRI (preferred) both with and without IV contrast (unless otherwise contraindicated) within 3-12 months of  surgery, then annually for up to 5 years or longer as clinically indicated  A more rigorous imaging schedule can be considered if there are positive margins or adverse pathologic features (such as sarcomatoid,  high-grade [grade 3/4])   Chest imaging:  Chest x-ray or CT annually for at least 5 years, then as clinically indicated  A more rigorous imaging schedule (CT preferred) can be considered if there are positive margins or adverse pathologic features       Problem List Items Addressed This Visit    None      Follow up:   1 year with CMP, CXR, CT abd W WO.  Resume full activity on April 7th.  Peter Loco             [1]   Social History  Socioeconomic History    Marital status:    Tobacco Use    " Smoking status: Former     Types: Cigarettes     Passive exposure: Never    Smokeless tobacco: Former    Tobacco comments:     Stopped Nov 2024   Substance and Sexual Activity    Alcohol use: Yes     Comment: Once a month    Drug use: Never    Sexual activity: Yes     Social Drivers of Health     Financial Resource Strain: Low Risk  (2/24/2025)    Overall Financial Resource Strain (CARDIA)     Difficulty of Paying Living Expenses: Not hard at all   Food Insecurity: No Food Insecurity (2/24/2025)    Hunger Vital Sign     Worried About Running Out of Food in the Last Year: Never true     Ran Out of Food in the Last Year: Never true   Transportation Needs: No Transportation Needs (2/16/2025)    PRAPARE - Transportation     Lack of Transportation (Medical): No     Lack of Transportation (Non-Medical): No   Physical Activity: Inactive (2/16/2025)    Exercise Vital Sign     Days of Exercise per Week: 0 days     Minutes of Exercise per Session: 0 min   Stress: No Stress Concern Present (2/24/2025)    [employer] - Occupational Stress Questionnaire     Feeling of Stress : Not at all   Housing Stability: Low Risk  (2/24/2025)    Housing Stability Vital Sign     Unable to Pay for Housing in the Last Year: No     Number of Times Moved in the Last Year: 0     Homeless in the Last Year: No   [2]   Patient Active Problem List  Diagnosis    COVID-19    Generalized body aches    Adjustment disorder with mixed emotional features    Anxiety    Arthralgia of shoulder    Axis IV diagnosis    Deferred diagnosis on axis I    Deferred diagnosis on axis II    Deferred diagnosis on axis III    Depression    Health examination of defined subpopulation    Low back pain    Neck pain    Traumatic arthropathy    Problem related to unspecified psychosocial circumstances    Adjustment disorder with mixed anxiety and depressed mood    Testosterone deficiency    Arthritis    Gastroesophageal reflux disease    Chest pain     Angina, class III    Heart failure with mid-range ejection fraction    Obstructive sleep apnea    Palpitations    Left renal mass    BMI 34.0-34.9,adult    HTN (hypertension)    Kidney stones    Fatty liver    Lower urinary tract symptoms (LUTS)    Renal impairment

## 2025-03-18 ENCOUNTER — OFFICE VISIT (OUTPATIENT)
Dept: UROLOGY | Facility: CLINIC | Age: 40
End: 2025-03-18
Payer: COMMERCIAL

## 2025-03-18 VITALS
HEART RATE: 72 BPM | BODY MASS INDEX: 34.84 KG/M2 | HEIGHT: 67 IN | SYSTOLIC BLOOD PRESSURE: 122 MMHG | DIASTOLIC BLOOD PRESSURE: 77 MMHG | WEIGHT: 222 LBS

## 2025-03-18 DIAGNOSIS — C64.2 CANCER OF KIDNEY PARENCHYMA, LEFT: Primary | ICD-10-CM

## 2025-03-18 PROCEDURE — 99499 UNLISTED E&M SERVICE: CPT | Mod: S$GLB,,, | Performed by: UROLOGY

## 2025-03-18 PROCEDURE — 99999 PR PBB SHADOW E&M-EST. PATIENT-LVL III: CPT | Mod: PBBFAC,,, | Performed by: UROLOGY

## 2025-03-18 NOTE — LETTER
March 18, 2025        Anselmo Cornell MD  [address] [facility] [address]             [facility] - Urology 2nd Fl  [address]  [place] [address]  Phone: [phone]   Patient: Syed Casanova   MR Number: [id number]   YOB: 1985   Date of Visit: 3/18/2025       Dear Dr. Cornell:    Thank you for referring Syed Casanova to me for evaluation. Attached you will find relevant portions of my assessment and plan of care.    If you have questions, please do not hesitate to call me. I look forward to following Syed Casanova along with you.    Sincerely,      Peter Loco MD            CC  No Recipients    Enclosure

## 2025-04-30 ENCOUNTER — PATIENT MESSAGE (OUTPATIENT)
Dept: ADMINISTRATIVE | Facility: OTHER | Age: 40
End: 2025-04-30
Payer: COMMERCIAL

## 2025-06-03 ENCOUNTER — PATIENT MESSAGE (OUTPATIENT)
Dept: CARDIOLOGY | Facility: CLINIC | Age: 40
End: 2025-06-03
Payer: COMMERCIAL

## 2025-06-10 RX ORDER — EMPAGLIFLOZIN 10 MG/1
10 TABLET, FILM COATED ORAL
Qty: 90 TABLET | Refills: 1 | Status: SHIPPED | OUTPATIENT
Start: 2025-06-10

## 2025-06-27 ENCOUNTER — PATIENT MESSAGE (OUTPATIENT)
Dept: CARDIOLOGY | Facility: CLINIC | Age: 40
End: 2025-06-27
Payer: COMMERCIAL

## 2025-06-27 ENCOUNTER — OFFICE VISIT (OUTPATIENT)
Dept: CARDIOLOGY | Facility: CLINIC | Age: 40
End: 2025-06-27
Payer: COMMERCIAL

## 2025-06-27 VITALS
HEART RATE: 80 BPM | DIASTOLIC BLOOD PRESSURE: 80 MMHG | OXYGEN SATURATION: 98 % | SYSTOLIC BLOOD PRESSURE: 118 MMHG | WEIGHT: 233 LBS | BODY MASS INDEX: 36.49 KG/M2

## 2025-06-27 DIAGNOSIS — R07.2 PRECORDIAL PAIN: ICD-10-CM

## 2025-06-27 DIAGNOSIS — M54.42 CHRONIC BILATERAL LOW BACK PAIN WITH BILATERAL SCIATICA: ICD-10-CM

## 2025-06-27 DIAGNOSIS — G89.29 CHRONIC BILATERAL LOW BACK PAIN WITH BILATERAL SCIATICA: ICD-10-CM

## 2025-06-27 DIAGNOSIS — I10 PRIMARY HYPERTENSION: ICD-10-CM

## 2025-06-27 DIAGNOSIS — M54.41 CHRONIC BILATERAL LOW BACK PAIN WITH BILATERAL SCIATICA: ICD-10-CM

## 2025-06-27 DIAGNOSIS — R00.2 PALPITATIONS: Primary | ICD-10-CM

## 2025-06-27 DIAGNOSIS — E66.01 MORBID OBESITY: ICD-10-CM

## 2025-06-27 PROCEDURE — 99214 OFFICE O/P EST MOD 30 MIN: CPT | Mod: PBBFAC,25 | Performed by: INTERNAL MEDICINE

## 2025-06-27 PROCEDURE — 1159F MED LIST DOCD IN RCRD: CPT | Mod: ,,, | Performed by: INTERNAL MEDICINE

## 2025-06-27 PROCEDURE — 3079F DIAST BP 80-89 MM HG: CPT | Mod: ,,, | Performed by: INTERNAL MEDICINE

## 2025-06-27 PROCEDURE — 93010 ELECTROCARDIOGRAM REPORT: CPT | Mod: S$PBB,,, | Performed by: INTERNAL MEDICINE

## 2025-06-27 PROCEDURE — 3074F SYST BP LT 130 MM HG: CPT | Mod: ,,, | Performed by: INTERNAL MEDICINE

## 2025-06-27 PROCEDURE — 3008F BODY MASS INDEX DOCD: CPT | Mod: ,,, | Performed by: INTERNAL MEDICINE

## 2025-06-27 PROCEDURE — 1160F RVW MEDS BY RX/DR IN RCRD: CPT | Mod: ,,, | Performed by: INTERNAL MEDICINE

## 2025-06-27 PROCEDURE — 99999 PR PBB SHADOW E&M-EST. PATIENT-LVL IV: CPT | Mod: PBBFAC,,, | Performed by: INTERNAL MEDICINE

## 2025-06-27 PROCEDURE — 4010F ACE/ARB THERAPY RXD/TAKEN: CPT | Mod: ,,, | Performed by: INTERNAL MEDICINE

## 2025-06-27 PROCEDURE — 93005 ELECTROCARDIOGRAM TRACING: CPT | Mod: PBBFAC | Performed by: INTERNAL MEDICINE

## 2025-06-27 PROCEDURE — 99214 OFFICE O/P EST MOD 30 MIN: CPT | Mod: S$PBB,,, | Performed by: INTERNAL MEDICINE

## 2025-06-27 NOTE — PROGRESS NOTES
PCP: Anselmo Cornell MD    Referring Provider:     Subjective:   Syed Casanova is a 39 y.o. male without significant past hx who presents for f/u.    6/27/25    Pt presents for follow up on palpitations and chest pain,  Pt reports he continues to experience palpitations late in the afternoon, come on spontaneously, last from several seconds to several minutes, will sit down if palpitations persist more than a few seconds, resolve either spontaneously or with rest,  notes palpitatons are much less frequent and less pronounced back on metoprolol BiD.  He notes chest pain has improved, although has occasional left sided chest pain, sometimes right sided, comes and goes spontaneously, last for seconds, resolved spontaneously  He is able to perform most normal activity, although is limited by chronic low back pain..     1/30/25: complains of dizziness with lower BP. No cardiac complaints. Originally changed to lopressor bid. Will call and stop lopressor to see if this improves.    11/22/24: presents for f/u after [facility] HF[facility]EF 40%. No reported chest pain, palpitations, worsening sob or edema        Fhx:  Family History   Problem Relation Name Age of Onset    No Known Problems Mother      No Known Problems Father      Breast cancer Maternal Grandmother      Heart attack Maternal Grandfather       Shx:   Social History     Socioeconomic History    Marital status:    Tobacco Use    Smoking status: Former     Types: Cigarettes     Passive exposure: Never    Smokeless tobacco: Former    Tobacco comments:     Stopped Nov 2024   Substance and Sexual Activity    Alcohol use: Yes     Comment: Once a month    Drug use: Never    Sexual activity: Yes     Social Drivers of Health     Financial Resource Strain: Low Risk  (2/24/2025)    Overall Financial Resource Strain (CARDIA)     Difficulty of Paying Living Expenses: Not hard at all   Food Insecurity: No Food Insecurity (2/24/2025)    Hunger Vital Sign     Worried  About Running Out of Food in the Last Year: Never true     Ran Out of Food in the Last Year: Never true   Transportation Needs: No Transportation Needs (2/16/2025)    PRAPARE - Transportation     Lack of Transportation (Medical): No     Lack of Transportation (Non-Medical): No   Physical Activity: Inactive (2/16/2025)    Exercise Vital Sign     Days of Exercise per Week: 0 days     Minutes of Exercise per Session: 0 min   Stress: No Stress Concern Present (2/24/2025)    [employer] [place] [employer] - Occupational Stress Questionnaire     Feeling of Stress : Not at all   Housing Stability: Low Risk  (2/24/2025)    Housing Stability Vital Sign     Unable to Pay for Housing in the Last Year: No     Number of Times Moved in the Last Year: 0     Homeless in the Last Year: No       EKG 11/22/24 NSR  ECHO Results for orders placed during the hospital encounter of 11/05/24    Echo    Interpretation Summary    Left Ventricle: The left ventricle is normal in size. Normal wall thickness. Regional wall motion abnormalities present. There is moderately reduced systolic function. Ejection fraction is approximately 40%.    Right Ventricle: Normal right ventricular cavity size. Systolic function is normal.    [facility] Results for orders placed during the hospital encounter of 11/05/24    Cardiac catheterization    Conclusion    The pre-procedure left ventricular end diastolic pressure was 8.    The estimated blood loss was <50 mL.    There was non-obstructive coronary artery disease. Minor luminal irregularities only.    Right dominant circulation.    The procedure log was documented by Documenter: Tashia Gaines RN and verified by Lenny Leon MD.    Date: 11/7/2024  Time: 10:43 AM        Lab Results   Component Value Date     02/25/2025    K 4.1 02/25/2025     02/25/2025    CO2 19 (L) 02/25/2025    BUN 26 (H) 02/25/2025    CREATININE 1.9 (H) 02/25/2025    CALCIUM 8.2 (L) 02/25/2025    ANIONGAP 9 02/25/2025     ESTGFRAFRICA 68 09/15/2020    EGFRNONAA 56 09/15/2020       Lab Results   Component Value Date    CHOL 117 11/06/2024    CHOL 140 07/22/2024     Lab Results   Component Value Date    HDL 35 (L) 11/06/2024    HDL 38 (L) 07/22/2024     Lab Results   Component Value Date    LDLCALC 65 11/06/2024    LDLCALC 71 07/22/2024     Lab Results   Component Value Date    TRIG 83 11/06/2024    TRIG 154 (H) 07/22/2024     Lab Results   Component Value Date    CHOLHDL 3.3 11/06/2024    CHOLHDL 3.7 07/22/2024       Lab Results   Component Value Date    WBC 11.21 02/25/2025    HGB 13.0 (L) 02/25/2025    HCT 40.2 02/25/2025    MCV 91 02/25/2025     02/25/2025           Current Outpatient Medications:     acetaminophen (TYLENOL) 650 MG TbSR, Take 1 tablet (650 mg total) by mouth every 8 (eight) hours., Disp: 30 tablet, Rfl: 1    aspirin (ECOTRIN) 81 MG EC tablet, Take 81 mg by mouth once daily., Disp: , Rfl:     busPIRone (BUSPAR) 10 MG tablet, Take 1 tablet (10 mg total) by mouth 2 (two) times daily. (Patient taking differently: Take 10 mg by mouth 2 (two) times daily. He takes it as needed for anxiety), Disp: 60 tablet, Rfl: 11    JARDIANCE 10 mg tablet, TAKE 1 TABLET (10 MG TOTAL) BY MOUTH ONCE DAILY., Disp: 90 tablet, Rfl: 1    losartan (COZAAR) 25 MG tablet, Take 1 tablet (25 mg total) by mouth once daily., Disp: 90 tablet, Rfl: 3    magnesium oxide 400 mg magnesium Tab, Take 1 tablet by mouth once daily., Disp: , Rfl:     metoprolol tartrate (LOPRESSOR) 25 MG tablet, Take 25 mg by mouth 2 (two) times daily., Disp: , Rfl:     multivitamin (THERAGRAN) per tablet, Take 1 tablet by mouth once daily., Disp: , Rfl:     oxyCODONE (ROXICODONE) 5 MG immediate release tablet, Take 1 tablet (5 mg total) by mouth every 4 (four) hours as needed for Pain., Disp: 10 tablet, Rfl: 0    pantoprazole (PROTONIX) 40 MG tablet, Take 1 tablet (40 mg total) by mouth once daily., Disp: 90 tablet, Rfl: 3    vilazodone (VIIBRYD) 10 mg Tab tablet,  Take 10 mg by mouth once daily., Disp: , Rfl:     Review of Systems   Respiratory:  Negative for shortness of breath.    Cardiovascular:  Negative for chest pain, palpitations and leg swelling.   Gastrointestinal:  Negative for heartburn.   All other systems reviewed and are negative.          Objective:   /80 (BP Location: Right arm, Patient Position: Sitting)   Pulse 80   Wt 105.7 kg (233 lb)   SpO2 98%   BMI 36.49 kg/m²     Physical Exam  Vitals reviewed.   Constitutional:       Appearance: He is obese. He is not ill-appearing.   HENT:      Head: Normocephalic.   Eyes:      Pupils: Pupils are equal, round, and reactive to light.   Cardiovascular:      Rate and Rhythm: Normal rate and regular rhythm.      Pulses: Normal pulses.      Heart sounds: Normal heart sounds.   Pulmonary:      Effort: Pulmonary effort is normal.   Musculoskeletal:         General: Normal range of motion.      Cervical back: Normal range of motion.   Skin:     General: Skin is warm and dry.      Capillary Refill: Capillary refill takes less than 2 seconds.   Neurological:      General: No focal deficit present.      Mental Status: He is alert and oriented to person, place, and time.   Psychiatric:         Mood and Affect: Mood normal.           Assessment:     1. Palpitations  EKG 12-lead    EKG 12-lead    Less frequent/severe palps on metoprolol bid, continue same, monitor clincally, does not want to change meds, feels is slowly improving.      2. Precordial pain      atypical chest pain with trivial CAD at [facility], events much less frequent, not as severe of shorter durat, suspect non-cardiac etiology, [facility]      3. Primary hypertension      Adequate control on current meds.      4. Chronic bilateral low back pain with bilateral sciatica      [facility], is at 90% disabled due to service related lumbar disc disease.      5. Morbid obesity      discussed lifestlye changes, low fat,low carbohydrate diet, increase  exercise, just walk on tread at 2 mi/hr, no hill, goal 30 mins 5/7 days a week.            Plan:   Increase exercise within tolerances of low back pain, goal walking 30 mins five days a week, lifestlye changes with wt loss goal 15 lbs in next six, months, follow up in six months, sooner if symptoms change.  Consider Zio of palpitations persist or begin to limit daily activity

## 2025-06-30 ENCOUNTER — PATIENT MESSAGE (OUTPATIENT)
Dept: CARDIOLOGY | Facility: CLINIC | Age: 40
End: 2025-06-30
Payer: COMMERCIAL

## 2025-06-30 LAB
OHS QRS DURATION: 94 MS
OHS QTC CALCULATION: 389 MS

## 2025-07-08 ENCOUNTER — TELEPHONE (OUTPATIENT)
Dept: CARDIOLOGY | Facility: CLINIC | Age: 40
End: 2025-07-08
Payer: COMMERCIAL

## 2025-07-08 NOTE — TELEPHONE ENCOUNTER
Copied from CRM #[id number]. Topic: General Inquiry - Patient Advice  >> Jul 8, 2025  4:00 PM Elsa wrote:  Who Called: Syed Casanova    Patient is trying to reach Jelani MA said it is about some meds they are to discuss. Tried calling but no answer. Please call him back when you can at the number below. Thanks      Preferred Method of Contact: Phone Call  Patient's Preferred Phone Number on File: [phone]    Spoke with the patient on today, per  patient will be taking two different prescriptions. Patient will be taking Metoprolol tartrate 50mg 1 tab in the morning and Metoprolol tartrate 25mg 1 tablet in the evening. Informed patient that the medication should be ready for pickup on tomorrow if not received by this evening. Patient verbalized understanding.

## 2025-07-09 RX ORDER — METOPROLOL TARTRATE 25 MG/1
25 TABLET, FILM COATED ORAL DAILY
Qty: 90 TABLET | Refills: 1 | Status: SHIPPED | OUTPATIENT
Start: 2025-07-09

## 2025-07-09 RX ORDER — METOPROLOL TARTRATE 50 MG/1
50 TABLET ORAL DAILY
Qty: 90 TABLET | Refills: 1 | Status: SHIPPED | OUTPATIENT
Start: 2025-07-09

## 2025-07-10 ENCOUNTER — TELEPHONE (OUTPATIENT)
Dept: CARDIOLOGY | Facility: CLINIC | Age: 40
End: 2025-07-10
Payer: COMMERCIAL

## 2025-07-10 NOTE — TELEPHONE ENCOUNTER
Copied from CRM #[id number]. Topic: Medications - Medication Question  >> Jul 10, 2025 10:27 AM Radha wrote:  Who Called: Syed Casanova    metoprolol tartrate (LOPRESSOR) 25 MG tablet  metoprolol tartrate (LOPRESSOR) 50 MG tablet    He called because his medication was sent to [facility] but it needs to go to [facility]. He asked if it could be done today because he has been out for 24 hours    [facility] [address]    Patient's Preferred Phone Number on File: [phone]    Spoke to the pharmacist on today from [facility], informed him that the patient wants his medication to go to [facility] pharmacy. He informed me to call [facility] and inform them to give him a call for the transfer. I then called [facility] and spoke with Fanta informed her to call for the transfer for Metoprolol tartrate 50mg in the AM and Metoprolol 25mg in the evening. Fanta verbalized understanding. Spoke with the patient on today and informed him that they are in the process of getting his medication transferred. Patient verbalized understanding.

## 2025-08-27 ENCOUNTER — OFFICE VISIT (OUTPATIENT)
Dept: FAMILY MEDICINE | Facility: CLINIC | Age: 40
End: 2025-08-27
Payer: COMMERCIAL

## 2025-08-27 VITALS
HEART RATE: 79 BPM | HEIGHT: 67 IN | BODY MASS INDEX: 37.35 KG/M2 | WEIGHT: 238 LBS | DIASTOLIC BLOOD PRESSURE: 82 MMHG | OXYGEN SATURATION: 96 % | SYSTOLIC BLOOD PRESSURE: 104 MMHG | TEMPERATURE: 99 F

## 2025-08-27 DIAGNOSIS — J02.9 ACUTE PHARYNGITIS, UNSPECIFIED ETIOLOGY: Primary | ICD-10-CM

## 2025-08-27 PROCEDURE — 4010F ACE/ARB THERAPY RXD/TAKEN: CPT | Mod: CPTII,,, | Performed by: FAMILY MEDICINE

## 2025-08-27 PROCEDURE — 96372 THER/PROPH/DIAG INJ SC/IM: CPT | Mod: ,,, | Performed by: FAMILY MEDICINE

## 2025-08-27 PROCEDURE — 3079F DIAST BP 80-89 MM HG: CPT | Mod: CPTII,,, | Performed by: FAMILY MEDICINE

## 2025-08-27 PROCEDURE — 3008F BODY MASS INDEX DOCD: CPT | Mod: CPTII,,, | Performed by: FAMILY MEDICINE

## 2025-08-27 PROCEDURE — 99213 OFFICE O/P EST LOW 20 MIN: CPT | Mod: 25,,, | Performed by: FAMILY MEDICINE

## 2025-08-27 PROCEDURE — 3074F SYST BP LT 130 MM HG: CPT | Mod: CPTII,,, | Performed by: FAMILY MEDICINE

## 2025-08-27 PROCEDURE — 1159F MED LIST DOCD IN RCRD: CPT | Mod: CPTII,,, | Performed by: FAMILY MEDICINE

## 2025-08-27 RX ORDER — DEXAMETHASONE SODIUM PHOSPHATE 4 MG/ML
4 INJECTION, SOLUTION INTRA-ARTICULAR; INTRALESIONAL; INTRAMUSCULAR; INTRAVENOUS; SOFT TISSUE
Status: COMPLETED | OUTPATIENT
Start: 2025-08-27 | End: 2025-08-27

## 2025-08-27 RX ORDER — METHYLPREDNISOLONE ACETATE 80 MG/ML
40 INJECTION, SUSPENSION INTRA-ARTICULAR; INTRALESIONAL; INTRAMUSCULAR; SOFT TISSUE
Status: COMPLETED | OUTPATIENT
Start: 2025-08-27 | End: 2025-08-27

## 2025-08-27 RX ORDER — OMEPRAZOLE 20 MG/1
20 CAPSULE, DELAYED RELEASE ORAL DAILY
Qty: 90 CAPSULE | Refills: 3 | Status: SHIPPED | OUTPATIENT
Start: 2025-08-27 | End: 2026-08-27

## 2025-08-27 RX ORDER — OMEPRAZOLE 20 MG/1
20 CAPSULE, DELAYED RELEASE ORAL DAILY
COMMUNITY
End: 2025-08-27

## 2025-08-27 RX ADMIN — METHYLPREDNISOLONE ACETATE 40 MG: 80 INJECTION, SUSPENSION INTRA-ARTICULAR; INTRALESIONAL; INTRAMUSCULAR; SOFT TISSUE at 08:08

## 2025-08-27 RX ADMIN — DEXAMETHASONE SODIUM PHOSPHATE 4 MG: 4 INJECTION, SOLUTION INTRA-ARTICULAR; INTRALESIONAL; INTRAMUSCULAR; INTRAVENOUS; SOFT TISSUE at 08:08

## (undated) DEVICE — ELECTRODE MEGADYNE RETURN DUAL

## (undated) DEVICE — SOL NACL 0.9% IV INJ 1000ML

## (undated) DEVICE — Device

## (undated) DEVICE — OXISENSOR ADULT DIGIT N/S

## (undated) DEVICE — PROTECTOR ULNAR NERVE FOAM

## (undated) DEVICE — BAG INZII TISS RETRV 12/15MM

## (undated) DEVICE — ETCO2 NC MICROSTR FEM ST ADLT

## (undated) DEVICE — DRAPE COLUMN DAVINCI XI

## (undated) DEVICE — SUT VICRYL+ 27 UR-6 VIOL

## (undated) DEVICE — DECANTER FLUID TRNSF WHITE 9IN

## (undated) DEVICE — GLOVE SENSICARE PI ALOE 5.5

## (undated) DEVICE — DRAPE ARM DAVINCI XI

## (undated) DEVICE — COVER PROBE US GEL BAND

## (undated) DEVICE — DRAPE ABDOMINAL TIBURON 14X11

## (undated) DEVICE — SET TRI-LUMEN FILTERED TUBE

## (undated) DEVICE — CHLORAPREP 10.5 ML APPLICATOR

## (undated) DEVICE — TAPE SILK 3IN

## (undated) DEVICE — NDL INSUF ULTRA VERESS 120MM

## (undated) DEVICE — CLIPPER BLADE MOD 4406 (CAREF)

## (undated) DEVICE — SUT MCRYL PLUS 4-0 PS2 27IN

## (undated) DEVICE — COVER LIGHT HANDLE

## (undated) DEVICE — GLOVE SENSICARE PI SURG 8

## (undated) DEVICE — SET EXTENSION CLEARLINK 2INJ

## (undated) DEVICE — DRAPE SCOPE PILLOW WARMER

## (undated) DEVICE — CONTRAST ISOVUE 370 100ML

## (undated) DEVICE — TRAY CATH 1-LYR URIMTR 16FR

## (undated) DEVICE — PENCIL ROCKER SWITCH 10FT CORD

## (undated) DEVICE — BLADE SURG CARBON STEEL SZ11

## (undated) DEVICE — SYR 10CC LUER LOCK

## (undated) DEVICE — KIT ANTIFOG W/SPONG & FLUID

## (undated) DEVICE — CUFF FLEXIPORT BP LONG ADULT

## (undated) DEVICE — GOWN NONREINF SET-IN SLV 2XL

## (undated) DEVICE — SEAL UNIVERSAL 5MM-8MM XI

## (undated) DEVICE — DRAPE RADIAL STERILE

## (undated) DEVICE — ADHESIVE DERMABOND ADVANCED

## (undated) DEVICE — CATH OPTITORQUE RADIAL 5FR

## (undated) DEVICE — SOL ELECTROLUBE ANTI-STIC

## (undated) DEVICE — CATH DIAG IMPULSE 6FR FR4

## (undated) DEVICE — IRRIGATOR ENDOSCOPY DISP.

## (undated) DEVICE — KIT GLIDESHEATH NIT 6FR 10CM

## (undated) DEVICE — NDL PERCUTANEOUS 2.5CM 21G

## (undated) DEVICE — GUIDEWIRE J .035X260CM

## (undated) DEVICE — PORT AIRSEAL 12/120MM LPI

## (undated) DEVICE — SET IV PRIMARY

## (undated) DEVICE — TRAY MINOR GEN SURG OMC

## (undated) DEVICE — COVER TIP CURVED SCISSORS XI

## (undated) DEVICE — GOWN SURGICAL X-LARGE